# Patient Record
Sex: FEMALE | Race: WHITE | NOT HISPANIC OR LATINO | Employment: OTHER | URBAN - METROPOLITAN AREA
[De-identification: names, ages, dates, MRNs, and addresses within clinical notes are randomized per-mention and may not be internally consistent; named-entity substitution may affect disease eponyms.]

---

## 2017-05-10 ENCOUNTER — HISTORICAL (OUTPATIENT)
Dept: ADMINISTRATIVE | Facility: HOSPITAL | Age: 72
End: 2017-05-10

## 2017-05-10 LAB
CHOLEST SERPL-MCNC: 207 MG/DL
CHOLEST/HDLC SERPL: 3.4 {RATIO} (ref 0–4.4)
HDLC SERPL-MCNC: 61 MG/DL
LDLC SERPL CALC-MCNC: 125 MG/DL (ref 0–130)
TRIGL SERPL-MCNC: 104 MG/DL
VLDLC SERPL CALC-MCNC: 21 MG/DL

## 2017-07-27 ENCOUNTER — HISTORICAL (OUTPATIENT)
Dept: INTERNAL MEDICINE | Facility: CLINIC | Age: 72
End: 2017-07-27

## 2017-11-13 ENCOUNTER — HISTORICAL (OUTPATIENT)
Dept: INTERNAL MEDICINE | Facility: CLINIC | Age: 72
End: 2017-11-13

## 2017-11-13 LAB
ABS NEUT (OLG): 3.73 X10(3)/MCL (ref 2.1–9.2)
BASOPHILS # BLD AUTO: 0.08 X10(3)/MCL
BASOPHILS NFR BLD AUTO: 1 % (ref 0–1)
EOSINOPHIL # BLD AUTO: 1.24 X10(3)/MCL
EOSINOPHIL NFR BLD AUTO: 17 % (ref 0–5)
ERYTHROCYTE [DISTWIDTH] IN BLOOD BY AUTOMATED COUNT: 12.2 % (ref 11.5–14.5)
HCT VFR BLD AUTO: 37.4 % (ref 35–46)
HGB BLD-MCNC: 12.7 GM/DL (ref 12–16)
IMM GRANULOCYTES # BLD AUTO: 0.01 10*3/UL
IMM GRANULOCYTES NFR BLD AUTO: 0 %
LYMPHOCYTES # BLD AUTO: 1.97 X10(3)/MCL
LYMPHOCYTES NFR BLD AUTO: 26 % (ref 15–40)
MCH RBC QN AUTO: 32 PG (ref 26–34)
MCHC RBC AUTO-ENTMCNC: 34 GM/DL (ref 31–37)
MCV RBC AUTO: 94.2 FL (ref 80–100)
MONOCYTES # BLD AUTO: 0.42 X10(3)/MCL
MONOCYTES NFR BLD AUTO: 6 % (ref 4–12)
NEUTROPHILS # BLD AUTO: 3.73 X10(3)/MCL
NEUTROPHILS NFR BLD AUTO: 50 X10(3)/MCL
PLATELET # BLD AUTO: 248 X10(3)/MCL (ref 130–400)
PMV BLD AUTO: 11.1 FL (ref 7.4–10.4)
RBC # BLD AUTO: 3.97 X10(6)/MCL (ref 4–5.2)
T4 FREE SERPL-MCNC: 1.35 NG/DL (ref 0.76–1.46)
TSH SERPL-ACNC: 0.31 MIU/L (ref 0.36–3.74)
WBC # SPEC AUTO: 7.4 X10(3)/MCL (ref 4.5–11)

## 2018-02-07 ENCOUNTER — HISTORICAL (OUTPATIENT)
Dept: ADMINISTRATIVE | Facility: HOSPITAL | Age: 73
End: 2018-02-07

## 2018-07-25 ENCOUNTER — HISTORICAL (OUTPATIENT)
Dept: ADMINISTRATIVE | Facility: HOSPITAL | Age: 73
End: 2018-07-25

## 2018-08-06 ENCOUNTER — HISTORICAL (OUTPATIENT)
Dept: INTERNAL MEDICINE | Facility: CLINIC | Age: 73
End: 2018-08-06

## 2018-08-06 LAB
ABS NEUT (OLG): 4.26 X10(3)/MCL (ref 2.1–9.2)
ALBUMIN SERPL-MCNC: 3.7 GM/DL (ref 3.4–5)
ALBUMIN/GLOB SERPL: 1 RATIO (ref 1–2)
ALP SERPL-CCNC: 80 UNIT/L (ref 45–117)
ALT SERPL-CCNC: 27 UNIT/L (ref 12–78)
AST SERPL-CCNC: 13 UNIT/L (ref 15–37)
BASOPHILS # BLD AUTO: 0.08 X10(3)/MCL
BASOPHILS NFR BLD AUTO: 1 %
BILIRUB SERPL-MCNC: 0.4 MG/DL (ref 0.2–1)
BILIRUBIN DIRECT+TOT PNL SERPL-MCNC: 0.1 MG/DL
BILIRUBIN DIRECT+TOT PNL SERPL-MCNC: 0.3 MG/DL
BUN SERPL-MCNC: 15 MG/DL (ref 7–18)
CALCIUM SERPL-MCNC: 10 MG/DL (ref 8.5–10.1)
CHLORIDE SERPL-SCNC: 109 MMOL/L (ref 98–107)
CHOLEST SERPL-MCNC: 224 MG/DL
CHOLEST/HDLC SERPL: 2.7 {RATIO} (ref 0–4.4)
CO2 SERPL-SCNC: 26 MMOL/L (ref 21–32)
CREAT SERPL-MCNC: 0.9 MG/DL (ref 0.6–1.3)
DEPRECATED CALCIDIOL+CALCIFEROL SERPL-MC: 21.73 NG/ML (ref 30–80)
EOSINOPHIL # BLD AUTO: 0.79 10*3/UL
EOSINOPHIL NFR BLD AUTO: 10 %
ERYTHROCYTE [DISTWIDTH] IN BLOOD BY AUTOMATED COUNT: 13.7 % (ref 11.5–14.5)
GLOBULIN SER-MCNC: 3.8 GM/ML (ref 2.3–3.5)
GLUCOSE SERPL-MCNC: 84 MG/DL (ref 74–106)
HCT VFR BLD AUTO: 40.1 % (ref 35–46)
HDLC SERPL-MCNC: 84 MG/DL
HGB BLD-MCNC: 13.4 GM/DL (ref 12–16)
IMM GRANULOCYTES # BLD AUTO: 0.02 10*3/UL
IMM GRANULOCYTES NFR BLD AUTO: 0 %
LDLC SERPL CALC-MCNC: 121 MG/DL (ref 0–130)
LYMPHOCYTES # BLD AUTO: 1.97 X10(3)/MCL
LYMPHOCYTES NFR BLD AUTO: 25 % (ref 13–40)
MCH RBC QN AUTO: 31.8 PG (ref 26–34)
MCHC RBC AUTO-ENTMCNC: 33.4 GM/DL (ref 31–37)
MCV RBC AUTO: 95 FL (ref 80–100)
MONOCYTES # BLD AUTO: 0.64 X10(3)/MCL
MONOCYTES NFR BLD AUTO: 8 % (ref 4–12)
NEUTROPHILS # BLD AUTO: 4.26 X10(3)/MCL
NEUTROPHILS NFR BLD AUTO: 55 X10(3)/MCL
PLATELET # BLD AUTO: 316 X10(3)/MCL (ref 130–400)
PMV BLD AUTO: 10.5 FL (ref 7.4–10.4)
POTASSIUM SERPL-SCNC: 4 MMOL/L (ref 3.5–5.1)
PROT SERPL-MCNC: 7.5 GM/DL (ref 6.4–8.2)
RBC # BLD AUTO: 4.22 X10(6)/MCL (ref 4–5.2)
SODIUM SERPL-SCNC: 142 MMOL/L (ref 136–145)
T4 FREE SERPL-MCNC: 1.11 NG/DL (ref 0.76–1.46)
TRIGL SERPL-MCNC: 95 MG/DL
TSH SERPL-ACNC: 1.27 MIU/L (ref 0.36–3.74)
VLDLC SERPL CALC-MCNC: 19 MG/DL
WBC # SPEC AUTO: 7.8 X10(3)/MCL (ref 4.5–11)

## 2018-08-08 ENCOUNTER — HISTORICAL (OUTPATIENT)
Dept: INTERNAL MEDICINE | Facility: CLINIC | Age: 73
End: 2018-08-08

## 2018-08-27 ENCOUNTER — HISTORICAL (OUTPATIENT)
Dept: RADIOLOGY | Facility: HOSPITAL | Age: 73
End: 2018-08-27

## 2018-11-05 ENCOUNTER — HISTORICAL (OUTPATIENT)
Dept: INTERNAL MEDICINE | Facility: CLINIC | Age: 73
End: 2018-11-05

## 2018-11-05 LAB
ALBUMIN SERPL-MCNC: 3.5 GM/DL (ref 3.4–5)
ALBUMIN/GLOB SERPL: 1 RATIO (ref 1–2)
ALP SERPL-CCNC: 75 UNIT/L (ref 45–117)
ALT SERPL-CCNC: 21 UNIT/L (ref 12–78)
AST SERPL-CCNC: 13 UNIT/L (ref 15–37)
BILIRUB SERPL-MCNC: 0.4 MG/DL (ref 0.2–1)
BILIRUBIN DIRECT+TOT PNL SERPL-MCNC: 0.2 MG/DL
BILIRUBIN DIRECT+TOT PNL SERPL-MCNC: 0.2 MG/DL
BUN SERPL-MCNC: 13 MG/DL (ref 7–18)
CALCIUM SERPL-MCNC: 10.1 MG/DL (ref 8.5–10.1)
CHLORIDE SERPL-SCNC: 109 MMOL/L (ref 98–107)
CO2 SERPL-SCNC: 28 MMOL/L (ref 21–32)
CREAT SERPL-MCNC: 0.9 MG/DL (ref 0.6–1.3)
DEPRECATED CALCIDIOL+CALCIFEROL SERPL-MC: 25.33 NG/ML (ref 30–80)
GLOBULIN SER-MCNC: 3.5 GM/ML (ref 2.3–3.5)
GLUCOSE SERPL-MCNC: 86 MG/DL (ref 74–106)
POTASSIUM SERPL-SCNC: 5.1 MMOL/L (ref 3.5–5.1)
PROT SERPL-MCNC: 7 GM/DL (ref 6.4–8.2)
SODIUM SERPL-SCNC: 142 MMOL/L (ref 136–145)

## 2019-01-08 ENCOUNTER — HISTORICAL (OUTPATIENT)
Dept: ADMINISTRATIVE | Facility: HOSPITAL | Age: 74
End: 2019-01-08

## 2019-01-08 LAB
T4 FREE SERPL-MCNC: 1.35 NG/DL (ref 0.76–1.46)
TSH SERPL-ACNC: 0.33 MIU/L (ref 0.36–3.74)

## 2019-04-09 ENCOUNTER — HISTORICAL (OUTPATIENT)
Dept: ADMINISTRATIVE | Facility: HOSPITAL | Age: 74
End: 2019-04-09

## 2019-06-24 ENCOUNTER — HISTORICAL (OUTPATIENT)
Dept: ADMINISTRATIVE | Facility: HOSPITAL | Age: 74
End: 2019-06-24

## 2019-07-18 ENCOUNTER — HISTORICAL (OUTPATIENT)
Dept: ADMINISTRATIVE | Facility: HOSPITAL | Age: 74
End: 2019-07-18

## 2019-07-18 LAB
CRP SERPL-MCNC: <0.3 MG/DL
ERYTHROCYTE [SEDIMENTATION RATE] IN BLOOD: 24 MM/HR (ref 0–20)

## 2019-07-23 ENCOUNTER — HISTORICAL (OUTPATIENT)
Dept: INTERNAL MEDICINE | Facility: CLINIC | Age: 74
End: 2019-07-23

## 2019-07-23 LAB
ABS NEUT (OLG): 3.35 X10(3)/MCL (ref 2.1–9.2)
BASOPHILS # BLD AUTO: 0.06 X10(3)/MCL
BASOPHILS NFR BLD AUTO: 1 %
CHOLEST SERPL-MCNC: 205 MG/DL
CHOLEST/HDLC SERPL: 2.4 {RATIO} (ref 0–4.4)
DEPRECATED CALCIDIOL+CALCIFEROL SERPL-MC: 41.26 NG/ML (ref 30–80)
EOSINOPHIL # BLD AUTO: 1.52 10*3/UL
EOSINOPHIL NFR BLD AUTO: 20 %
ERYTHROCYTE [DISTWIDTH] IN BLOOD BY AUTOMATED COUNT: 13.2 % (ref 11.5–14.5)
HCT VFR BLD AUTO: 42.7 % (ref 35–46)
HDLC SERPL-MCNC: 87 MG/DL
HGB BLD-MCNC: 13.6 GM/DL (ref 12–16)
IMM GRANULOCYTES # BLD AUTO: 0.01 10*3/UL
IMM GRANULOCYTES NFR BLD AUTO: 0 %
LDLC SERPL CALC-MCNC: 102 MG/DL (ref 0–130)
LYMPHOCYTES # BLD AUTO: 2.11 X10(3)/MCL
LYMPHOCYTES NFR BLD AUTO: 28 % (ref 13–40)
MCH RBC QN AUTO: 29.8 PG (ref 26–34)
MCHC RBC AUTO-ENTMCNC: 31.9 GM/DL (ref 31–37)
MCV RBC AUTO: 93.6 FL (ref 80–100)
MONOCYTES # BLD AUTO: 0.51 X10(3)/MCL
MONOCYTES NFR BLD AUTO: 7 % (ref 0–24)
NEUTROPHILS # BLD AUTO: 3.35 X10(3)/MCL
NEUTROPHILS NFR BLD AUTO: 44 X10(3)/MCL
PLATELET # BLD AUTO: 261 X10(3)/MCL (ref 130–400)
PMV BLD AUTO: 10.9 FL (ref 7.4–10.4)
RBC # BLD AUTO: 4.56 X10(6)/MCL (ref 4–5.2)
T4 SERPL-MCNC: 11.5 MCG/DL (ref 4.8–13.9)
TRIGL SERPL-MCNC: 78 MG/DL
TSH SERPL-ACNC: 2.16 MIU/L (ref 0.36–3.74)
VLDLC SERPL CALC-MCNC: 16 MG/DL
WBC # SPEC AUTO: 7.6 X10(3)/MCL (ref 4.5–11)

## 2019-08-28 ENCOUNTER — HISTORICAL (OUTPATIENT)
Dept: RADIOLOGY | Facility: HOSPITAL | Age: 74
End: 2019-08-28

## 2019-11-25 ENCOUNTER — HISTORICAL (OUTPATIENT)
Dept: ADMINISTRATIVE | Facility: HOSPITAL | Age: 74
End: 2019-11-25

## 2020-02-18 ENCOUNTER — HISTORICAL (OUTPATIENT)
Dept: INTERNAL MEDICINE | Facility: CLINIC | Age: 75
End: 2020-02-18

## 2020-02-18 LAB
ALBUMIN SERPL-MCNC: 3.4 GM/DL (ref 3.4–5)
ALBUMIN/GLOB SERPL: 0.9 RATIO (ref 1.1–2)
ALP SERPL-CCNC: 114 UNIT/L (ref 45–117)
ALT SERPL-CCNC: 22 UNIT/L (ref 12–78)
AST SERPL-CCNC: 13 UNIT/L (ref 15–37)
BILIRUB SERPL-MCNC: 0.4 MG/DL (ref 0.2–1)
BILIRUBIN DIRECT+TOT PNL SERPL-MCNC: 0.1 MG/DL (ref 0–0.2)
BILIRUBIN DIRECT+TOT PNL SERPL-MCNC: 0.3 MG/DL
BUN SERPL-MCNC: 12 MG/DL (ref 7–18)
CALCIUM SERPL-MCNC: 9.9 MG/DL (ref 8.5–10.1)
CHLORIDE SERPL-SCNC: 111 MMOL/L (ref 98–107)
CHOLEST SERPL-MCNC: 210 MG/DL
CHOLEST/HDLC SERPL: 2.6 {RATIO} (ref 0–4.4)
CO2 SERPL-SCNC: 28 MMOL/L (ref 21–32)
CREAT SERPL-MCNC: 0.9 MG/DL (ref 0.6–1.3)
DEPRECATED CALCIDIOL+CALCIFEROL SERPL-MC: 31.5 NG/ML (ref 30–80)
EST. AVERAGE GLUCOSE BLD GHB EST-MCNC: 120 MG/DL
GLOBULIN SER-MCNC: 3.8 GM/ML (ref 2.3–3.5)
GLUCOSE SERPL-MCNC: 77 MG/DL (ref 74–106)
HBA1C MFR BLD: 5.8 % (ref 4.2–6.3)
HDLC SERPL-MCNC: 81 MG/DL (ref 40–59)
LDLC SERPL CALC-MCNC: 116 MG/DL
POTASSIUM SERPL-SCNC: 4.3 MMOL/L (ref 3.5–5.1)
PROT SERPL-MCNC: 7.2 GM/DL (ref 6.4–8.2)
SODIUM SERPL-SCNC: 141 MMOL/L (ref 136–145)
T4 SERPL-MCNC: 10.7 MCG/DL (ref 4.8–13.9)
TRIGL SERPL-MCNC: 66 MG/DL
TSH SERPL-ACNC: 1.5 MIU/L (ref 0.36–3.74)
VLDLC SERPL CALC-MCNC: 13 MG/DL

## 2020-07-08 ENCOUNTER — HISTORICAL (OUTPATIENT)
Dept: ADMINISTRATIVE | Facility: HOSPITAL | Age: 75
End: 2020-07-08

## 2020-07-17 ENCOUNTER — HISTORICAL (OUTPATIENT)
Dept: ADMINISTRATIVE | Facility: HOSPITAL | Age: 75
End: 2020-07-17

## 2020-07-21 ENCOUNTER — HISTORICAL (OUTPATIENT)
Dept: SURGERY | Facility: HOSPITAL | Age: 75
End: 2020-07-21

## 2020-08-03 ENCOUNTER — HISTORICAL (OUTPATIENT)
Dept: ADMINISTRATIVE | Facility: HOSPITAL | Age: 75
End: 2020-08-03

## 2020-08-31 ENCOUNTER — HISTORICAL (OUTPATIENT)
Dept: ADMINISTRATIVE | Facility: HOSPITAL | Age: 75
End: 2020-08-31

## 2020-09-10 ENCOUNTER — HISTORICAL (OUTPATIENT)
Dept: ADMINISTRATIVE | Facility: HOSPITAL | Age: 75
End: 2020-09-10

## 2020-09-10 LAB
ABS NEUT (OLG): 5.39 X10(3)/MCL (ref 2.1–9.2)
ALBUMIN SERPL-MCNC: 3.8 GM/DL (ref 3.4–5)
ALBUMIN/GLOB SERPL: 1 RATIO (ref 1.1–2)
ALP SERPL-CCNC: 111 UNIT/L (ref 45–117)
ALT SERPL-CCNC: 14 UNIT/L (ref 12–78)
AST SERPL-CCNC: 12 UNIT/L (ref 15–37)
BASOPHILS # BLD AUTO: 0.1 X10(3)/MCL (ref 0–0.2)
BASOPHILS NFR BLD AUTO: 1 %
BILIRUB SERPL-MCNC: 0.3 MG/DL (ref 0.2–1)
BILIRUBIN DIRECT+TOT PNL SERPL-MCNC: <0.1 MG/DL (ref 0–0.2)
BILIRUBIN DIRECT+TOT PNL SERPL-MCNC: ABNORMAL MG/DL
BUN SERPL-MCNC: 22 MG/DL (ref 7–18)
CALCIUM SERPL-MCNC: 10.6 MG/DL (ref 8.5–10.1)
CHLORIDE SERPL-SCNC: 107 MMOL/L (ref 98–107)
CO2 SERPL-SCNC: 26 MMOL/L (ref 21–32)
CREAT SERPL-MCNC: 1 MG/DL (ref 0.6–1.3)
DEPRECATED CALCIDIOL+CALCIFEROL SERPL-MC: 56.5 NG/ML (ref 30–80)
EOSINOPHIL # BLD AUTO: 1.1 X10(3)/MCL (ref 0–0.9)
EOSINOPHIL NFR BLD AUTO: 12 %
ERYTHROCYTE [DISTWIDTH] IN BLOOD BY AUTOMATED COUNT: 12.7 % (ref 11.5–14.5)
EST. AVERAGE GLUCOSE BLD GHB EST-MCNC: 103 MG/DL
GLOBULIN SER-MCNC: 3.8 GM/ML (ref 2.3–3.5)
GLUCOSE SERPL-MCNC: 97 MG/DL (ref 74–106)
HBA1C MFR BLD: 5.2 % (ref 4.2–6.3)
HCT VFR BLD AUTO: 38.9 % (ref 35–46)
HGB BLD-MCNC: 12.3 GM/DL (ref 12–16)
IMM GRANULOCYTES # BLD AUTO: 0.02 10*3/UL
IMM GRANULOCYTES NFR BLD AUTO: 0 %
LYMPHOCYTES # BLD AUTO: 1.9 X10(3)/MCL (ref 0.6–4.6)
LYMPHOCYTES NFR BLD AUTO: 21 %
MCH RBC QN AUTO: 30 PG (ref 26–34)
MCHC RBC AUTO-ENTMCNC: 31.6 GM/DL (ref 31–37)
MCV RBC AUTO: 94.9 FL (ref 80–100)
MONOCYTES # BLD AUTO: 0.7 X10(3)/MCL (ref 0.1–1.3)
MONOCYTES NFR BLD AUTO: 7 %
NEUTROPHILS # BLD AUTO: 5.39 X10(3)/MCL (ref 2.1–9.2)
NEUTROPHILS NFR BLD AUTO: 59 %
PLATELET # BLD AUTO: 300 X10(3)/MCL (ref 130–400)
PMV BLD AUTO: 10.1 FL (ref 7.4–10.4)
POTASSIUM SERPL-SCNC: 4.3 MMOL/L (ref 3.5–5.1)
PROT SERPL-MCNC: 7.6 GM/DL (ref 6.4–8.2)
RBC # BLD AUTO: 4.1 X10(6)/MCL (ref 4–5.2)
SODIUM SERPL-SCNC: 141 MMOL/L (ref 136–145)
T3FREE SERPL-MCNC: 1.9 PG/ML (ref 2.18–3.98)
T4 FREE SERPL-MCNC: 1.2 NG/DL (ref 0.76–1.46)
TSH SERPL-ACNC: 1.17 MIU/L (ref 0.36–3.74)
WBC # SPEC AUTO: 9.2 X10(3)/MCL (ref 4.5–11)

## 2020-10-05 ENCOUNTER — HISTORICAL (OUTPATIENT)
Dept: ADMINISTRATIVE | Facility: HOSPITAL | Age: 75
End: 2020-10-05

## 2020-10-08 ENCOUNTER — HISTORICAL (OUTPATIENT)
Dept: SURGERY | Facility: HOSPITAL | Age: 75
End: 2020-10-08

## 2020-11-04 ENCOUNTER — HISTORICAL (OUTPATIENT)
Dept: RADIOLOGY | Facility: HOSPITAL | Age: 75
End: 2020-11-04

## 2020-11-04 LAB
ABS NEUT (OLG): 3.27 X10(3)/MCL (ref 2.1–9.2)
ALBUMIN SERPL-MCNC: 3.9 GM/DL (ref 3.4–4.8)
ALBUMIN/GLOB SERPL: 1 RATIO (ref 1.1–2)
ALP SERPL-CCNC: 115 UNIT/L (ref 40–150)
ALT SERPL-CCNC: 9 UNIT/L (ref 0–55)
AST SERPL-CCNC: 14 UNIT/L (ref 5–34)
BASOPHILS # BLD AUTO: 0 X10(3)/MCL (ref 0–0.2)
BASOPHILS NFR BLD AUTO: 1 %
BILIRUB SERPL-MCNC: 0.5 MG/DL
BILIRUBIN DIRECT+TOT PNL SERPL-MCNC: 0.2 MG/DL (ref 0–0.5)
BILIRUBIN DIRECT+TOT PNL SERPL-MCNC: 0.3 MG/DL (ref 0–0.8)
BUN SERPL-MCNC: 16 MG/DL (ref 9.8–20.1)
CALCIUM SERPL-MCNC: 10.6 MG/DL (ref 8.4–10.2)
CHLORIDE SERPL-SCNC: 106 MMOL/L (ref 98–107)
CO2 SERPL-SCNC: 25 MMOL/L (ref 23–31)
CREAT SERPL-MCNC: 0.83 MG/DL (ref 0.55–1.02)
EOSINOPHIL # BLD AUTO: 0.9 X10(3)/MCL (ref 0–0.9)
EOSINOPHIL NFR BLD AUTO: 13 %
ERYTHROCYTE [DISTWIDTH] IN BLOOD BY AUTOMATED COUNT: 13 % (ref 11.5–14.5)
GLOBULIN SER-MCNC: 3.9 GM/DL (ref 2.4–3.5)
GLUCOSE SERPL-MCNC: 79 MG/DL (ref 82–115)
HCT VFR BLD AUTO: 41.8 % (ref 35–46)
HGB BLD-MCNC: 13.6 GM/DL (ref 12–16)
IMM GRANULOCYTES # BLD AUTO: 0.01 10*3/UL
IMM GRANULOCYTES NFR BLD AUTO: 0 %
LYMPHOCYTES # BLD AUTO: 2 X10(3)/MCL (ref 0.6–4.6)
LYMPHOCYTES NFR BLD AUTO: 29 %
MCH RBC QN AUTO: 30.2 PG (ref 26–34)
MCHC RBC AUTO-ENTMCNC: 32.5 GM/DL (ref 31–37)
MCV RBC AUTO: 92.9 FL (ref 80–100)
MONOCYTES # BLD AUTO: 0.6 X10(3)/MCL (ref 0.1–1.3)
MONOCYTES NFR BLD AUTO: 9 %
NEUTROPHILS # BLD AUTO: 3.27 X10(3)/MCL (ref 2.1–9.2)
NEUTROPHILS NFR BLD AUTO: 49 %
PLATELET # BLD AUTO: 274 X10(3)/MCL (ref 130–400)
PMV BLD AUTO: 11.3 FL (ref 7.4–10.4)
POTASSIUM SERPL-SCNC: 4.2 MMOL/L (ref 3.5–5.1)
PROT SERPL-MCNC: 7.8 GM/DL (ref 5.8–7.6)
PTH-INTACT SERPL-MCNC: 92.1 PG/ML (ref 18.4–80.1)
RBC # BLD AUTO: 4.5 X10(6)/MCL (ref 4–5.2)
SODIUM SERPL-SCNC: 138 MMOL/L (ref 136–145)
WBC # SPEC AUTO: 6.7 X10(3)/MCL (ref 4.5–11)

## 2020-11-23 ENCOUNTER — HISTORICAL (OUTPATIENT)
Dept: RADIOLOGY | Facility: HOSPITAL | Age: 75
End: 2020-11-23

## 2021-01-27 ENCOUNTER — HISTORICAL (OUTPATIENT)
Dept: ADMINISTRATIVE | Facility: HOSPITAL | Age: 76
End: 2021-01-27

## 2021-02-09 ENCOUNTER — HISTORICAL (OUTPATIENT)
Dept: RADIOLOGY | Facility: HOSPITAL | Age: 76
End: 2021-02-09

## 2021-04-20 ENCOUNTER — HISTORICAL (OUTPATIENT)
Dept: ADMINISTRATIVE | Facility: HOSPITAL | Age: 76
End: 2021-04-20

## 2021-04-20 LAB
ALBUMIN SERPL-MCNC: 3.8 GM/DL (ref 3.4–4.8)
ALBUMIN/GLOB SERPL: 1.1 RATIO (ref 1.1–2)
ALP SERPL-CCNC: 110 UNIT/L (ref 40–150)
ALT SERPL-CCNC: 13 UNIT/L (ref 0–55)
AST SERPL-CCNC: 15 UNIT/L (ref 5–34)
BILIRUB SERPL-MCNC: 0.5 MG/DL
BILIRUBIN DIRECT+TOT PNL SERPL-MCNC: 0.2 MG/DL (ref 0–0.5)
BILIRUBIN DIRECT+TOT PNL SERPL-MCNC: 0.3 MG/DL (ref 0–0.8)
BUN SERPL-MCNC: 11.8 MG/DL (ref 9.8–20.1)
CALCIUM SERPL-MCNC: 10.7 MG/DL (ref 8.4–10.2)
CALCIUM UR-MCNC: 17.3 MG/DL
CHLORIDE SERPL-SCNC: 111 MMOL/L (ref 98–107)
CO2 SERPL-SCNC: 23 MMOL/L (ref 23–31)
CREAT SERPL-MCNC: 0.8 MG/DL (ref 0.55–1.02)
CREAT UR-MCNC: 108.5 MG/DL (ref 45–106)
DEPRECATED CALCIDIOL+CALCIFEROL SERPL-MC: 32.9 NG/ML (ref 30–80)
GLOBULIN SER-MCNC: 3.6 GM/DL (ref 2.4–3.5)
GLUCOSE SERPL-MCNC: 85 MG/DL (ref 82–115)
PHOSPHATE SERPL-MCNC: 2.7 MG/DL (ref 2.3–4.7)
POTASSIUM SERPL-SCNC: 4.4 MMOL/L (ref 3.5–5.1)
PROT SERPL-MCNC: 7.4 GM/DL (ref 5.8–7.6)
PTH-INTACT SERPL-MCNC: 126.3 PG/ML (ref 8.7–77)
SODIUM SERPL-SCNC: 142 MMOL/L (ref 136–145)

## 2021-05-05 ENCOUNTER — HISTORICAL (OUTPATIENT)
Dept: ADMINISTRATIVE | Facility: HOSPITAL | Age: 76
End: 2021-05-05

## 2021-07-21 ENCOUNTER — HISTORICAL (OUTPATIENT)
Dept: ENDOCRINOLOGY | Facility: CLINIC | Age: 76
End: 2021-07-21

## 2021-07-21 LAB
ALBUMIN SERPL-MCNC: 3.8 GM/DL (ref 3.4–4.8)
BUN SERPL-MCNC: 15.4 MG/DL (ref 9.8–20.1)
CALCIUM SERPL-MCNC: 10.9 MG/DL (ref 8.4–10.2)
CHLORIDE SERPL-SCNC: 109 MMOL/L (ref 98–107)
CO2 SERPL-SCNC: 30 MMOL/L (ref 23–31)
CREAT SERPL-MCNC: 0.82 MG/DL (ref 0.55–1.02)
DEPRECATED CALCIDIOL+CALCIFEROL SERPL-MC: 32.1 NG/ML (ref 30–80)
GLUCOSE SERPL-MCNC: 82 MG/DL (ref 82–115)
PHOSPHATE SERPL-MCNC: 2.9 MG/DL (ref 2.3–4.7)
POTASSIUM SERPL-SCNC: 4.9 MMOL/L (ref 3.5–5.1)
SODIUM SERPL-SCNC: 142 MMOL/L (ref 136–145)
T4 FREE SERPL-MCNC: 1.25 NG/DL (ref 0.7–1.48)
TSH SERPL-ACNC: 0.2 UIU/ML (ref 0.35–4.94)

## 2021-09-28 ENCOUNTER — HISTORICAL (OUTPATIENT)
Dept: ADMINISTRATIVE | Facility: HOSPITAL | Age: 76
End: 2021-09-28

## 2021-09-28 LAB — TSH SERPL-ACNC: 1.31 UIU/ML (ref 0.35–4.94)

## 2021-11-08 ENCOUNTER — HISTORICAL (OUTPATIENT)
Dept: RADIOLOGY | Facility: HOSPITAL | Age: 76
End: 2021-11-08

## 2022-01-26 ENCOUNTER — HISTORICAL (OUTPATIENT)
Dept: ADMINISTRATIVE | Facility: HOSPITAL | Age: 77
End: 2022-01-26

## 2022-01-26 LAB
ALBUMIN SERPL-MCNC: 3.6 GM/DL (ref 3.4–4.8)
BUN SERPL-MCNC: 17.4 MG/DL (ref 9.8–20.1)
CALCIUM SERPL-MCNC: 10.7 MG/DL (ref 8.7–10.5)
CHLORIDE SERPL-SCNC: 109 MMOL/L (ref 98–107)
CO2 SERPL-SCNC: 25 MMOL/L (ref 23–31)
CREAT SERPL-MCNC: 0.84 MG/DL (ref 0.55–1.02)
GLUCOSE SERPL-MCNC: 90 MG/DL (ref 82–115)
PHOSPHATE SERPL-MCNC: 2.9 MG/DL (ref 2.3–4.7)
POTASSIUM SERPL-SCNC: 4.3 MMOL/L (ref 3.5–5.1)
SODIUM SERPL-SCNC: 140 MMOL/L (ref 136–145)
TSH SERPL-ACNC: 1.16 UIU/ML (ref 0.35–4.94)

## 2022-03-02 ENCOUNTER — HISTORICAL (OUTPATIENT)
Dept: RADIOLOGY | Facility: HOSPITAL | Age: 77
End: 2022-03-02

## 2022-03-02 LAB — CREAT SERPL-MCNC: 0.84 MG/DL (ref 0.55–1.02)

## 2022-04-10 ENCOUNTER — HISTORICAL (OUTPATIENT)
Dept: ADMINISTRATIVE | Facility: HOSPITAL | Age: 77
End: 2022-04-10
Payer: MEDICARE

## 2022-04-29 VITALS
HEIGHT: 59 IN | WEIGHT: 138.88 LBS | DIASTOLIC BLOOD PRESSURE: 64 MMHG | OXYGEN SATURATION: 97 % | SYSTOLIC BLOOD PRESSURE: 116 MMHG | BODY MASS INDEX: 28 KG/M2

## 2022-04-30 NOTE — H&P
Patient:   Mikala Thompson            MRN: [id number]            FIN: [id number]               Age:   74 years     Sex:  Female     :  1945   Associated Diagnoses:   None   Author:   Kasia Fishman MD      HPI: PT here for cataract surgery of the left eye. Denies changes in health or medications since the patient was last seen in clinic.    ROS: Negative x 10      SLE: Visually significant cataract of the left eye           General NAP  HENT atraumatic  Eyes: cataract OS  Neck: nontender, no masses or thyromegaly  Respiratory: no distress on room air  Cardiovascular: regular rate  Gastrointestinal: no hepatomegaly   Lymphatics: no lymphadenopathy  Musculoskeletal: wnl    ?  Assessment/Plan  1.  Visually significant cataract of the left eye   - Covid testing verified: Negative  - Allergies: Verified  - Surgical site marked  -Consent: Verified  - Questions answered  - Ok to proceed with surgery     Kasia Fishman MD  [employer] [facility] PGY-4

## 2022-04-30 NOTE — H&P
Patient:   Mikala Thompson            MRN: [id number]            FIN: [id number]               Age:   74 years     Sex:  Female     :  1945   Associated Diagnoses:   None   Author:   Aliyah Parker MD      Chief Complaint:  referral for left wrist fx   History of Present Illness: 74-year-old female, RHD, who presented after a fall on 3030 and diagnosed with a closed left distal radius fracture.    Here today for ORIF left distal radius with Dr. Seals.    Review of Systems Constitutional: no fever, fatigue, weakness  Eye: no vision loss, eye redness, drainage, or pain  ENMT: no sore throat, ear pain, sinus pain/congestion, nasal congestion/drainage  Respiratory: no cough, no wheezing, no shortness of breath  Cardiovascular: no chest pain, no palpitations, no edema  Gastrointestinal: no nausea, vomiting, or diarrhea. No abdominal pain Physical Exam Vitals & Measurements WT: 65.5 kg BMI: 29.11 General appearance: No acute distress  Orientation: Awake, alert and oriented to person, place and time  Mood/Affect: Normal  Gait: Normal  Coordination: Normal    Left upper extremity:  Inspection: Significant swelling around the wrist and dorsal hand. No open wounds. Bruising  Palpation: Tender palpation on radial wrist  Range of motion: Deferred secondary to known fracture  Stability: Crepitus and motion at fracture site  Motor: Intact thumbs up, okay and finger cross  Skin: No open wounds or rashes  Vascular: Fingers are warm and well-perfused. 2+ radial pulse  Sensation: Intact to radial, ulnar and median nerves   Assessment/Plan   74-year-old female, RHD, who presented after a fall on 3030 and diagnosed with a closed left distal radius fracture with dorsal angulation and comminution.  Here today for ORIF left distal radius with Dr. Seals.

## 2022-04-30 NOTE — OP NOTE
Patient:   Mikala Thompson            MRN: [id number]            FIN: [id number]               Age:   74 years     Sex:  Female     :  1945   Associated Diagnoses:   None   Author:   Kasia Fishman MD      OPERATIVE NOTE -- OPHTHALMOLOGY SERVICE    DATE: 10/8/20    SURGEON: Kasia Fishman MD    ATTENDING: DEMARCO Ortiz MD    PRE-OPERATIVE DIAGNOSIS: Visually significant cataract, LEFT eye    POST-OPERATIVE DIAGNOSIS: Visually significant cataract, LEFT eye    PROCEDURES: Phacoemulsification with intraocular lens, LEFT eye    IMPLANT: MX60E +12.50 SN [id number]    ANESTHESIA: MAC with topical lidocaine    COMPLICATIONS: None    ESTIMATED BLOOD LOSS: < 5 cc    INDICATION:    The patient has a history of painless progressive visual loss and difficulty with activities of daily living secondary to cataract formation. After a thorough discussion of the risks, benefits and alternatives to cataract surgery, including, but not limited to, the rare risks of infection, retinal detachment, need for additional surgery, loss of vision and even loss of the eye, the patient voices good understanding and desires to proceed. Written informed consent was confirmed and witnessed prior to surgery.    PROCEDURE IN DETAIL:    The patients IOL calculations and lens selection were reviewed and confirmed. After verification and marking of the proper eye in the preop holding area, several sets of 1% tropicamide, 2.5% phenylephrine, and 1% cyclopentolate drops were then placed.    The patient was brought to the operating room in supine position where the eye was prepped and draped in standard sterile fashion using 5% betadine. A lid speculum placed. Topical tetracaine was used in addition to the preoperative anesthesia. A paracentesis incision was created through which preservative free lidocaine was injected into the eye. Then trypan blue was injected through the paracentesis for better visualization of the anterior  capsule. Preservative free lidocaine was again injected through the paracentesis to remove the trypan blue. At this time, viscoelastic was used to fill the anterior chamber through the paracentesis wound. A 2.4mm keratome blade was used to create a triplanar temporal clear corneal incision. A cystotome and Utrata forceps was then used to fashion a continuous curvilinear capsulorrhexis. Hydrodissection with BSS was performed using a hydrodissection cannula. Phacoemulsification of the nucleus was carried out with a divide and conquer technique, and all remaining epinuclear and cortical material was removed. The eye was reformed using viscoelastic and the above listed intraocular lens was implanted into the capsular bag. All remaining viscoelastic was removed from the eye with the  I&A. All wounds were found to be watertight at the end of the case.    Drops of Vigamox and Pred Forte.t were instilled and an eye shield placed over the eye. The patient tolerated the procedure well and was taken to the recovery area in stable condition. The patient was instructed to follow-up for routine post-operative care the following morning.    The attending surgeon was present, supervising, and assisting as necessary during the entire surgery.

## 2022-05-02 NOTE — HISTORICAL [facility] CERNER
This is a historical note converted from Shiela. Formatting and pictures may have been removed.  Please reference Shiela for original formatting and attached multimedia. Chief Complaint  f/u, no c/o voiced, needs refills on all meds, refused vaccines  History of Present Illness  Ms Thompson who is a 73-year-old white female with a history of hypertension, hypothyroidism, hyperlipidemia, bilateral wrist osteoarthritis, osteopenia.?Patient had a fall on 6/30/2020 and was diagnosed with a closed left distal radius fracture, underwent ORIF left distal radius. ?Currently wearing her brace.??Needs to have some pain?at the site of the fracture, her arms in a brace. ?States that?she has some weakness?with her fingers, however, she is been working with physical therapy.? She denies any other complaints today, compliant with all her medications. Patient follows up with ophthalmology for her blepharitis/keratoconjunctivitis and glaucoma  Review of Systems  General:?no fever, no night sweats, chills, fatigue, changes in weight.  Skin: no rashes, bruises, petechia  HEENT: no headache, head injury, no acute vision changes.  CVS: no chest pain, palpitations, orthopnea, PND, edema  Resp: no SOB, cough, wheezing  GI: no dysphagia, melena, hematochezia, abdominal pain, nausea, vomiting, constipation.  : no dysuria, hematuria, polyuria, CVA pain, nocturia  Musculoskeletal: left arm  Neuro: no headaches, syncope, seizures, numbness, tingling, vertigo, dizziness  Physical Exam  Vitals & Measurements  T:?36.6? ?C (Oral)? HR:?73(Peripheral)? RR:?18? BP:?108/67?  HT:?150.00?cm? WT:?65.000?kg? BMI:?28.89?  General: AAOx3, NAD, alert and cooperative  HEENT: Normocephalic, atraumatic,?PERRLA, EOMI,?no scleral icterus, no?conjunctival pallor  Neck: no LAD, no JVD, supple, no masses or thyromegaly. Trachea midline  CVS: S1/S2 nml, RRR, no murmurs, rubs or gallops  Resp: CTA B/L, no rhonchi, rales, or wheezing  GI: Not distended, not tender, BS+,  no guarding  Skin: not jaundiced, warm, no rashes  Extremities: slight restriction of ROM in left?forearm,?covered in brace  Neuro: CN II-XII grossly intact, strength and sensation symmetric and intact throughout, no focal neurological deficits.  Assessment/Plan  1)?Hypertension  -well controlled  -Continue amlodipine 5, losartan 50  ?  2) Hyperlipidemia  -Cholesterol 210,  on 2/2020.?  -Advised patient to avoid fatty, greasy and fried foods and to incorporate more fruits and vegetables in her diet  -Continue pravastatin 40  ?  3) Hypothyroidism  - TSH and T4 within normal limits?on 2/2020  - Continue Synthroid 75??g  - Will repeat thyroid panel?today  ?  4) Bilateral wrist osteoarthritis/knee osteoarthritis  - Advised against the use of NSAIDs given her slightly depressed renal function  - Follows with sports medicine clinic for injections  - Patient currently not on any NSAIDs  ?  5) Osteopenia  -DEXA scan on 8/2018 showed osteopenia in the femoral necks  -We will repeat a DEXA scan to see if there is worsening of her T-scores which would necessitate antiresorptive agents  -Continue Ca/Vit D supplementation  -Vit D WNL on 2/2020  ?  6)?Left distal radius fracture s/p ORIF  -Had a left distal radius fx on 6/30/2020 and underwent ORIF on 7/21/2020  -Continue f/u with Ortho  ?  7) Glaucoma  8) Blepharitis/keratoconjunctivitis  -Continue f/u with Ophthalmology  ?  ?  ?  9) Stage 2 CKD  -eGFR on 2/2020 was 79  -?Continue renal sparing activities:  -Follow low sodium diet (2 grams a day)  -Control high blood pressure ( goal BP < 130/80, please record BP at home every day and bring log to next office visit)  -Exercise at least 30 minutes a day, 5 days a week.  -Maintain healthy weight.  -Decrease or stop alcohol use  -Do not smoke  -Stay well hydrated  -Receive Pneumovax, Flu, and HBV vaccines if indicated.  -Do not take NSAIDs (Ibuprofen, Naproxen, Aleve, Advil, Toradol, Mobic), may take only Tylenol as needed  for pain/headaches.  -Take cholesterol-lowering medications as prescribed (LDL goal <100)  ?  ?  ?  Womens Health Maintenance?  ?  Cervical cancer screening:?Not indicated  Breast cancer screening:?Last on 8/2019 WNL, scheduled  Colorectal cancer screening:?FIT -ve on 4/2019, repeat ordered  DEXA Scan:?Reorder?  ?  Vaccines: UTD  Zoster: Refused previously  ?   Problem List/Past Medical History  Ongoing  Cataract, nuclear sclerotic, both eyes  Hypercholesteremia  Hypertension  Hypothyroid  Keratoconjunctivitis sicca of both eyes  Open angle with borderline findings, low risk  Osteoarthritis, hand  Osteoarthritis, knee  Historical  Knowledge deficit  Pregnant  Pregnant  Pregnant  Pregnant  Procedure/Surgical History  [place]/screw for opposing bone-to-bone or soft tissue-to-bone (implantable) (07/21/2020)  Injection(s), anesthetic agent(s) and/or steroid; brachial plexus (07/21/2020)  Introduction of Anesthetic Agent into Peripheral Nerves and Plexi, Percutaneous Approach (07/21/2020)  Open treatment of distal radial extra-articular fracture or epiphyseal separation, with internal fixation (07/21/2020)  ORIF Radius (Left) (07/21/2020)  Reposition Left Radius with Internal Fixation Device, Open Approach (07/21/2020)  Application of short arm splint (forearm to hand); static (06/30/2020)  Immobilization of Left Lower Arm using Splint (06/30/2020)  History of arthroscopic shoulder surgery (2008)  TL - Tubal ligation (1980)   Medications  acetaminophen-hydrocodone 325 mg-5 mg oral tablet, 1 tab(s), Oral, BID  amlodipine 5 mg oral tablet, See Instructions, 5 refills  benzonatate 200 mg oral capsule, 200 mg= 1 cap(s), Oral, TID,? ?Not taking  levothyroxine 75 mcg (0.075 mg) oral tablet, See Instructions, 6 refills  losartan 50 mg oral tablet, See Instructions  pravastatin 40 mg oral tablet, See Instructions, 6 refills  Vitamin C 1000 mg oral tablet, 1000 mg= 1 tab(s), Oral, Daily  Allergies  No Known Allergies  Social  History  Abuse/Neglect  No, No, Yes, 09/10/2020  Alcohol - No Risk, 05/14/2015  Never, 05/30/2016  Employment/School  Retired, Work/School description: retired. Highest education level: Some college. Operates hazardous equipment: No., 05/14/2015  Exercise  Exercise frequency: Daily. Self assessment: Good condition. Exercise type: Walking., 05/14/2015  Home/Environment  Lives with Alone. Living situation: Home/Independent. Alcohol abuse in household: No. Substance abuse in household: No. Smoker in household: No. Injuries/Abuse/Neglect in household: No. Feels unsafe at home: No. Family/Friends available for support: Yes. Concern for family members at home: No. Major illness in household: No. Financial concerns: No. TV/Computer concerns: No., 05/14/2015  Nutrition/Health  Regular, Good, 07/18/2019  Sexual  Sexually active: No., 09/10/2020  Gender Identity Identifies as female., 03/06/2020  Spiritual/Cultural  Adventist, Yes, 09/10/2020  Substance Use - No Risk, 05/14/2015  Never, 05/30/2016  Tobacco - No Risk, 05/14/2015  Never (less than 100 in lifetime), N/A, Household tobacco concerns: No., 09/10/2020  Family History  Cancer: Father.  Metastatic cancer: Brother.  Immunizations  Vaccine Date Status Comments   influenza virus vaccine, inactivated 11/05/2019 Given    pneumococcal 13-valent conjugate vaccine 07/18/2019 Given    influenza virus vaccine, inactivated 11/12/2018 Given    tetanus/diphtheria/pertussis, acel(Tdap) 07/25/2018 Given    influenza virus vaccine, inactivated 11/13/2017 Given    influenza virus vaccine, inactivated 12/14/2016 Given    pneumococcal 23-polyvalent vaccine 11/04/2015 Given tolerated well   influenza virus vaccine, inactivated 11/04/2015 Given tolerated well   Health Maintenance  Health Maintenance  ???Pending?(in the next year)  ??? ??OverDue  ??? ? ? ?Influenza Vaccine due??and every?  ??? ? ? ?Pneumococcal Vaccine due??and every?  ??? ? ? ?Alcohol Misuse Screening due??01/02/20??and  every 1??year(s)  ??? ? ? ?Colorectal Screening due??04/07/20??and every 1??year(s)  ??? ??Due?  ??? ? ? ?Aspirin Therapy for CVD Prevention due??07/18/20??and every 1??year(s)  ??? ? ? ?Bone Density Screening due??08/08/20??and every 2??year(s)  ??? ? ? ?Hypertension Management-Education due??09/10/20??and every 1??year(s)  ??? ? ? ?Medicare Annual Wellness Exam due??09/10/20??and every 1??year(s)  ??? ? ? ?Zoster Vaccine due??09/10/20??and every?  ??? ??Due In Future?  ??? ? ? ?Obesity Screening not due until??01/01/21??and every 1??year(s)  ??? ? ? ?Advance Directive not due until??01/02/21??and every 1??year(s)  ??? ? ? ?Cognitive Screening not due until??01/02/21??and every 1??year(s)  ??? ? ? ?Fall Risk Assessment not due until??01/02/21??and every 1??year(s)  ??? ? ? ?Functional Assessment not due until??01/02/21??and every 1??year(s)  ??? ? ? ?Diabetes Screening not due until??02/17/21??and every 1??year(s)  ??? ? ? ?Hypertension Management-BMP not due until??02/17/21??and every 1??year(s)  ??? ? ? ?Breast Cancer Screening not due until??08/27/21??and every 2??year(s)  ???Satisfied?(in the past 1 year)  ??? ??Satisfied?  ??? ? ? ?ADL Screening on??09/10/20.??Satisfied by Marli Macias LPN  ??? ? ? ?Advance Directive on??09/10/20.??Satisfied by Macias LPN, Marli Yamil  ??? ? ? ?Blood Pressure Screening on??09/10/20.??Satisfied by Macias LPN, Marli Yamil  ??? ? ? ?Body Mass Index Check on??09/10/20.??Satisfied by Macias LPN, Marli Yamil  ??? ? ? ?Cognitive Screening on??09/10/20.??Satisfied by Gilberto ALBERTSN, Marli Yamil  ??? ? ? ?Depression Screening on??09/10/20.??Satisfied by Gilberto ALBERTSN, Marli Yamil  ??? ? ? ?Diabetes Screening on??02/18/20.??Satisfied by Srinivasa Dsouza Jr.  ??? ? ? ?Fall Risk Assessment on??09/10/20.??Satisfied by Gilberto VALENZUELA, Marli Yamil  ??? ? ? ?Functional Assessment on??09/10/20.??Satisfied by Gilberto ALBERTSN, Marli Yamil  ??? ? ?  ?Hypertension Management-Blood Pressure on??09/10/20.??Satisfied by Marli Macias LPN  ??? ? ? ?Influenza Vaccine on??11/05/19.??Satisfied by Marli Macias LPN  ??? ? ? ?Lipid Screening on??02/18/20.??Satisfied by Srinivasa Dsouza Jr.  ??? ? ? ?Obesity Screening on??09/10/20.??Satisfied by Marli Macias LPN  ?      Will need Ms Thompson to come back in a month for her flu vaccine   Reviewed present illness, physical exam, assessment/plan of resident. Case discussed with the resident. Care provided is reasonable and necessary.  Fragility fracture as patient fell from standing position fracturing left wrist,?will discuss with patient weight bearing exercise, calcium in diet and supplement to achieve 100-1200 mg/day, Vitamin D level >30, antiresorptive agent

## 2022-05-02 NOTE — HISTORICAL [facility] CERNER
This is a historical note converted from [employer]. Formatting and pictures may have been removed.  Please reference [employer] for original formatting and attached multimedia. Chief Complaint  Follow up, medication management/refills, c/o dry, hacking cough time one week, question of Losartan  History of Present Illness  73-year-old white female with a history of hypertension, hypothyroidism, hyperlipidemia, bilateral wrist osteoarthritis, osteopenia who is here for a follow-up visit and medication refill.?The patient c/o of a dry hacking cough since 1 week. Not associated fever, chills, rash, hemoptysis, night sweats. Also c/o post nasal drip. No runny nose or watering from eyes. Patient was concerned regarding her BP medication, Losartan and asked if it was being recalled. I counselled and told her that losartan is safe and valsartan was being recalled. Patient had situational depression and was on TCAs. Currently not on any medication.  Review of Systems  Constitutional: No appetite changes, weight loss, fever, chills, change in activity?level.  HEENT: No earache, No changes vision, no changes in swallowing, no coryza, no scleral or conjunctival changes, no sore throat.  Respiratory: Cough +, dry, No?wheezing, stridor, shortness of breath, difficulty breathing.  Cardiovascular: No discoloration, chest pain, history of murmur, palpitations, shortness of breath, dizziness.  Gastrointestinal: No vomiting, stool changes, blood in stool, abdominal pain.  Genitourinary: No frequency, discharge, ?burning, blood in urine.  Neurological: No lethargy, seizure activity, headache, weakness, headache.  Developmental: No history of gross motor, fine motor, speech or other developmental delays.  Integumentary: No rashes, bruising, lesions.  Psych/Behavior: No history of irritability, lethargy, behavior changes, mood changes.  Endocrine: No unexpected weight changes, growth pattern changes, skin temperature changes, heat or cold  intolerance, appetite or thirst changes.  Musculoskeletal: No swelling of joints, diminished use of extremities, gait changes, weakness.  ?  ?  ?  ?  Physical Exam  Vitals & Measurements  T:?36.9? ?C (Oral)? HR:?79(Peripheral)? RR:?18? BP:?109/64?  HT:?149?cm? WT:?60.4?kg? BMI:?27.21?  No pallor, icterus, cyanosis, clubbing, lymphadenopathy, pedal edema  ?  General:?well-developed well-nourished in no acute distress  Eye: PERRLA, EOMI, clear conjunctiva, eyelids normal  Head:?Normocephalic, atraumatic  ENT:?TMs/ear canals clear, oropharynx without erythema/exudate, oropharynx and nasal mucosal surfaces moist, no maxillary/frontal sinus tenderness to palpation  Neck: full range of motion, no JVD or carotid bruits. No thyromegaly or lymphadenopathy  Respiratory:?clear to auscultation bilaterally, without rales, wheezes or rhonchi. Normal work of breathing. Chest rise symmetrical on inspiration.  Cardiovascular:?regular rate and rhythm without murmurs, gallops or rubs. Normal peripheral perfusion.  Gastrointestinal:?soft, non-tender, non-distended with normal bowel sounds, without masses to palpation  Genitourinary: no CVA tenderness to palpation  Musculoskeletal:?full range of motion of all extremities/spine without limitation or discomfort  Integumentary: no rashes or skin lesions present  Neurologic: cranial nerves intact, no signs of peripheral neurological deficit, motor/sensory function intact  Cognition and Speech:?Oriented, Speech clear and coherent, Functional cognition intact.  Psychiatric:?Cooperative, Appropriate mood & affect  ?  Assessment/Plan  Hypertension  -Well Controlled  -Continue amlodipine 5, losartan 50  ?   Hyperlipidemia  -Controlled  -Continue pravastatin 40  -Ordered Lipid panel for today  ?   Hypothyroidism  -TSH and T4 within normal limits  -Continue Synthroid 75 ?g  -Ordered thyroid panel for today.  ?   Bilateral wrist osteoarthritis/knee osteoarthritis  -Advised against the use of NSAIDs  given her slightly depressed renal function  -Follows with sports medicine clinic for injections  -Patient currently not on any NSAIDs  ?   Osteopenia/ Vit D deficiency  -Continue calcium supplmenet 1000-1200mg daily  -Continue Vit D OTC replacement 2000U daily  -Ordered VIt D level today.  ?  ?   -Ordered: CBC,TSH,T4, Vit D, Lipid panel  -Medications: Tessalon Perles, fexofenadine, Fluticasone nasal spray  -Situational depression resolved, patient off TCAs.  -Colon cancer screening: Colonoscopy 2013 normal per previous clinic note  -Mammogram: 8/2018 negative. Patient has another mammogram scheduled for 8/19.  -Pneumonia 23?vaccine?received on 11/15  -Pneumonia 13?administered on 7/2019  -Patient advised on Shingrix vaccine. Will research and consider taking it next time.  -Will follow up in 3 months  ?   Problem List/Past Medical History  Ongoing  Cataract, nuclear sclerotic, both eyes  Hypercholesteremia  Hypothyroid  Keratoconjunctivitis sicca of both eyes  Knowledge deficit  Open angle with borderline findings, low risk  Historical  Pregnant  Pregnant  Pregnant  Pregnant  Procedure/Surgical History  History of arthroscopic shoulder surgery (2008)  TL - Tubal ligation (1980)   Medications  amLODIPine 5 mg oral tablet, 5 mg= 1 tab(s), Oral, Daily  ciprofloxacin 0.3% ophthalmic solution, 2 drop(s), Eye-Both, q4hr,? ?Not Taking, Completed Rx  erythromycin 0.5% ophthalmic ointment, 1 kaitlynn, Eye-Both, QID,? ?Not Taking, Completed Rx  ketotifen 0.025% ophthalmic solution, 1 drop(s), Eye-Both, q8hr,? ?Not Taking, Completed Rx  levothyroxine 75 mcg (0.075 mg) oral tablet, 75 mcg= 1 tab(s), Oral, Daily  loratadine 10 mg oral tablet, 10 mg= 1 tab(s), Oral, Daily,? ?Not Taking, Completed Rx  losartan 50 mg oral tablet, 50 mg= 1 tab(s), Oral, Daily, 3 refills  meloxicam 7.5 mg oral tablet, 7.5 mg= 1 tab(s), Oral, Daily,? ?Not taking  olopatadine 0.1% ophthalmic solution, 1 drop(s), Eye-Both, BID,? ?Not Taking, Completed  Rx  Pravastatin 40 mg Oral Tab, 40 mg= 1 tab(s), Oral, Daily  Xyzal 5 mg oral tablet, 5 mg= 1 tab(s), Oral, qPM,? ?Not taking  Allergies  No Known Allergies  Social History  Abuse/Neglect  No, 07/18/2019  No, 06/24/2019  Alcohol - No Risk, 05/14/2015  Never, 05/30/2016  Employment/School  Retired, Work/School description: retired. Highest education level: Some college. Operates hazardous equipment: No., 05/14/2015  Exercise  Exercise frequency: Daily. Self assessment: Good condition. Exercise type: Walking., 05/14/2015  Home/Environment  Lives with Alone. Living situation: Home/Independent. Alcohol abuse in household: No. Substance abuse in household: No. Smoker in household: No. Injuries/Abuse/Neglect in household: No. Feels unsafe at home: No. Family/Friends available for support: Yes. Concern for family members at home: No. Major illness in household: No. Financial concerns: No. TV/Computer concerns: No., 05/14/2015  Nutrition/Health  Regular, Good, 07/18/2019  Sexual  Gender Identity Identifies as female., 07/01/2019  Substance Use - No Risk, 05/14/2015  Never, 05/30/2016  Tobacco - No Risk, 05/14/2015  Never (less than 100 in lifetime), N/A, 07/18/2019  Family History  Cancer: Father.  Metastatic cancer: Brother.  Immunizations  Vaccine Date Status Comments   influenza virus vaccine, inactivated 11/12/2018 Given    tetanus/diphtheria/pertussis, acel(Tdap) 07/25/2018 Given    influenza virus vaccine, inactivated 11/13/2017 Given    influenza virus vaccine, inactivated 12/14/2016 Given    pneumococcal 23-polyvalent vaccine 11/04/2015 Given tolerated well   influenza virus vaccine, inactivated 11/04/2015 Given tolerated well   Health Maintenance  Health Maintenance  ???Pending?(in the next year)  ??? ??OverDue  ??? ? ? ?Pneumococcal Vaccine due??and every?  ??? ? ? ?Advance Directive due??01/01/19??and every 1??year(s)  ??? ??Due?  ??? ? ? ?Aspirin Therapy for CVD Prevention due??07/18/19??and every  1??year(s)  ??? ? ? ?Zoster Vaccine due??07/18/19??and every 100??year(s)  ??? ??Due In Future?  ??? ? ? ?Hypertension Management-BMP not due until??11/05/19??and every 1??year(s)  ??? ? ? ?Alcohol Misuse Screening not due until??01/01/20??and every 1??year(s)  ??? ? ? ?Cognitive Screening not due until??01/01/20??and every 1??year(s)  ??? ? ? ?Fall Risk Assessment not due until??01/01/20??and every 1??year(s)  ??? ? ? ?Functional Assessment not due until??01/01/20??and every 1??year(s)  ??? ? ? ?Geriatric Depression Screening not due until??01/01/20??and every 1??year(s)  ??? ? ? ?Obesity Screening not due until??01/01/20??and every 1??year(s)  ??? ? ? ?Colorectal Screening ([facility]) not due until??04/07/20??and every 1??year(s)  ??? ? ? ?ADL Screening not due until??05/14/20??and every 1??year(s)  ??? ? ? ?Hypertension Management-Blood Pressure not due until??07/17/20??and every 1??year(s)  ???Satisfied?(in the past 1 year)  ??? ??Satisfied?  ??? ? ? ?ADL Screening on??05/14/19.??Satisfied by [place], Samreen F  ??? ? ? ?Advance Directive on??09/11/18.??Satisfied by Maddie Bustos LPN  ??? ? ? ?Alcohol Misuse Screening on??04/09/19.??Satisfied by Ludivinate, Samreen F  ??? ? ? ?Blood Pressure Screening on??07/18/19.??Satisfied by Trudi VALENZUELA Chantal  ??? ? ? ?Body Mass Index Check on??07/18/19.??Satisfied by Chantal Brush LPN  ??? ? ? ?Bone Density Screening on??08/08/18.??Satisfied by Elizabeth Jade  ??? ? ? ?Breast Cancer Screening ([facility]) on??08/27/18.??Satisfied by Cailin Dumont  ??? ? ? ?Cognitive Screening on??04/09/19.??Satisfied by Samreen Murray  ??? ? ? ?Colorectal Screening ([facility]) on??04/08/19.??Satisfied by Zoe Nugent  ??? ? ? ?Depression Screening on??05/14/19.??Satisfied by Samreen Murray  ??? ? ? ?Diabetes Screening on??11/05/18.??Satisfied by Kim Mcarthur  ??? ? ? ?Fall Risk Assessment on??07/08/19.??Satisfied by Gwendolyn Brush LPN  Get  ??? ? ? ?Functional Assessment on??04/09/19.??Satisfied by Samreen Murray  ??? ? ? ?Geriatric Depression Screening on??05/14/19.??Satisfied by Samreen Murray  ??? ? ? ?Hypertension Management-Blood Pressure on??07/18/19.??Satisfied by Chantal Brush LPN  ??? ? ? ?Influenza Vaccine on??12/28/18.??Satisfied by Gwendolyn Brush LPN  ??? ? ? ?Lipid Screening on??08/06/18.??Satisfied by Abril Rivera  ??? ? ? ?Obesity Screening on??07/18/19.??Satisfied by Chantal Brush LPN  ??? ? ? ?Tetanus Vaccine on??07/25/18.??Satisfied by Qing Bustos LPN  ?      Patient discussed in detail with medicine resident  Blood pressure and cholesterol medicines continued-await repeat lipid?thyroid labs  Agree with remaining assessment and plan

## 2022-06-29 ENCOUNTER — HOSPITAL ENCOUNTER (EMERGENCY)
Facility: HOSPITAL | Age: 77
Discharge: HOME | End: 2022-06-29
Attending: FAMILY MEDICINE
Payer: MEDICARE

## 2022-06-29 VITALS
TEMPERATURE: 99 F | DIASTOLIC BLOOD PRESSURE: 73 MMHG | SYSTOLIC BLOOD PRESSURE: 128 MMHG | RESPIRATION RATE: 16 BRPM | BODY MASS INDEX: 28.63 KG/M2 | HEIGHT: 59 IN | HEART RATE: 93 BPM | OXYGEN SATURATION: 97 % | WEIGHT: 142 LBS

## 2022-06-29 DIAGNOSIS — U07.1 COVID-19: Primary | ICD-10-CM

## 2022-06-29 DIAGNOSIS — R05.9 COUGH: ICD-10-CM

## 2022-06-29 LAB
FLUAV AG UPPER RESP QL IA.RAPID: NOT DETECTED
FLUBV AG UPPER RESP QL IA.RAPID: NOT DETECTED
SARS-COV-2 RNA RESP QL NAA+PROBE: DETECTED
STREP A PCR (OHS): NOT DETECTED

## 2022-06-29 PROCEDURE — 87631 RESP VIRUS 3-5 TARGETS: CPT | Performed by: NURSE PRACTITIONER

## 2022-06-29 PROCEDURE — 87636 SARSCOV2 & INF A&B AMP PRB: CPT | Mod: 59 | Performed by: NURSE PRACTITIONER

## 2022-06-29 PROCEDURE — 99284 EMERGENCY DEPT VISIT MOD MDM: CPT | Mod: 25

## 2022-06-29 RX ORDER — AMLODIPINE BESYLATE 5 MG/1
5 TABLET ORAL DAILY
COMMUNITY
Start: 2022-05-20 | End: 2022-11-15 | Stop reason: SDUPTHER

## 2022-06-29 RX ORDER — PROMETHAZINE HYDROCHLORIDE AND DEXTROMETHORPHAN HYDROBROMIDE 6.25; 15 MG/5ML; MG/5ML
5 SYRUP ORAL EVERY 6 HOURS PRN
Qty: 100 ML | Refills: 0 | Status: SHIPPED | OUTPATIENT
Start: 2022-06-29 | End: 2022-07-04

## 2022-06-29 RX ORDER — FLUTICASONE PROPIONATE 50 MCG
1 SPRAY, SUSPENSION (ML) NASAL 2 TIMES DAILY PRN
Qty: 15 G | Refills: 0 | Status: SHIPPED | OUTPATIENT
Start: 2022-06-29 | End: 2022-08-02

## 2022-06-29 NOTE — DISCHARGE INSTRUCTIONS
Increase fluid intake, use tylenol every 6-8 hours for temperature of 101 or greater.   Follow up with your primary care physician in 3-5 days for follow up evaluation.  Return to the [facility] ED immediately for chest pain, SOB, or fever.

## 2022-06-29 NOTE — ED PROVIDER NOTES
Encounter Date: 6/29/2022       History     Chief Complaint   Patient presents with    Cough     Pt to ed c/o cough, sore throat, fever since Saturday. States cough is non productive.      Pt is a 76 y.o. female who presents to the [facility] ED complaining of cough, sore throat. Symptoms x 3 days. Denies chest pain, SOB, weakness, dizziness, or fever. Denies relief with OTC medications.         Review of patient's allergies indicates:  No Known Allergies  No past medical history on file.  No past surgical history on file.  No family history on file.  Social History     Tobacco Use    Smoking status: Never Smoker    Smokeless tobacco: Never Used   Substance Use Topics    Alcohol use: Never    Drug use: Never     Review of Systems   Constitutional: Negative for chills, diaphoresis, fatigue and fever.   HENT: Positive for sinus pressure and sore throat. Negative for facial swelling, rhinorrhea, sinus pain and trouble swallowing.    Respiratory: Positive for cough. Negative for chest tightness, shortness of breath and wheezing.    Cardiovascular: Negative for chest pain, palpitations and leg swelling.   Gastrointestinal: Negative for abdominal pain, diarrhea, nausea and vomiting.   Genitourinary: Negative for dysuria, flank pain, frequency, hematuria and urgency.   Musculoskeletal: Negative for arthralgias, back pain, joint swelling and myalgias.   Skin: Negative for color change and rash.   Neurological: Negative for dizziness, syncope, weakness and light-headedness.   Hematological: Does not bruise/bleed easily.   All other systems reviewed and are negative.      Physical Exam     Initial Vitals [06/29/22 1006]   BP Pulse Resp Temp SpO2   128/73 93 16 98.7 °F (37.1 °C) 97 %      MAP       --         Physical Exam    Nursing note and vitals reviewed.  Constitutional: She appears well-developed and well-nourished.   HENT:   Head: Normocephalic and atraumatic.   Nose: Mucosal edema and rhinorrhea present.   Mouth/Throat:  Posterior oropharyngeal erythema present. No oropharyngeal exudate or tonsillar abscesses.   Eyes: Conjunctivae and EOM are normal. Pupils are equal, round, and reactive to light.   Neck: Neck supple.   Normal range of motion.  Cardiovascular: Normal rate, regular rhythm, normal heart sounds and intact distal pulses.   Pulmonary/Chest: Effort normal and breath sounds normal. No respiratory distress. She has no wheezes. She has no rhonchi. She has no rales. She exhibits no tenderness.   Dry cough present.     Abdominal: Abdomen is soft and flat. Bowel sounds are normal. She exhibits no distension. There is no abdominal tenderness. There is no rebound, no guarding, no tenderness at McBurney's point and negative Hughes's sign. negative psoas sign  Musculoskeletal:         General: Normal range of motion.      Cervical back: Normal range of motion and neck supple.     Neurological: She is alert and oriented to person, place, and time. She has normal strength and normal reflexes.   Skin: Skin is warm and dry. Capillary refill takes less than 2 seconds.   Psychiatric: She has a normal mood and affect. Her speech is normal and behavior is normal. Judgment and thought content normal.         ED Course   Procedures  Labs Reviewed   COVID/FLU A&B PCR - Abnormal; Notable for the following components:       Result Value    SARS-CoV-2 PCR Detected (*)     All other components within normal limits   STREP GROUP A BY PCR - Normal          Imaging Results          X-Ray Chest 1 View (Final result)  Result time 06/29/22 10:37:06    Final result by Moy Guerra MD (06/29/22 10:37:06)                 Impression:      NO ACUTE CARDIOPULMONARY PROCESS IDENTIFIED.      Electronically signed by: Moy Guerra  Date:    06/29/2022  Time:    10:37             Narrative:    EXAMINATION:  XR CHEST 1 VIEW    CLINICAL HISTORY:  Cough, unspecified    TECHNIQUE:  One view    COMPARISON:  June 23, 2020.    FINDINGS:  Cardiopericardial silhouette  is within normal limits.  No acute dense focal or segmental consolidation, congestive process, pleural effusions or pneumothorax.                                 Medications - No data to display  Medical Decision Making:   History:   Old Medical Records: I decided to obtain old medical records.  Differential Diagnosis:   URI  Pneumonia  COVID  Influenza  Clinical Tests:   Lab Tests: Ordered and Reviewed  Radiological Study: Ordered and Reviewed  ED Management:  11:29 AM Reassessed patient at this time. Reports condition has improved. Discussed with patient all pertinent ED information and results. Discussed diagnosis and treatment plan with patient. Follow up instructions and return to ED instruction have been given. All questions and concerns were addressed at this time. Patient voices understanding of information and instructions. Patient is comfortable with plan and discharge. Patient is stable for discharge.                         Clinical Impression:   Final diagnoses:  [R05.9] Cough  [U07.1] COVID-19 (Primary)          ED Disposition Condition    Discharge Stable        ED Prescriptions     Medication Sig Dispense Start Date End Date Auth. Provider    fluticasone propionate (FLONASE) 50 mcg/actuation nasal spray 1 spray (50 mcg total) by Each Nostril route 2 (two) times daily as needed for Rhinitis. 15 g 6/29/2022  Shabbir Ott Jr., AIDA    promethazine-dextromethorphan (PROMETHAZINE-DM) 6.25-15 mg/5 mL Syrp Take 5 mLs by mouth every 6 (six) hours as needed (cough). 100 mL 6/29/2022 7/4/2022 Shabbir Ott Jr., AIDA        Follow-up Information     Follow up With Specialties Details Why Contact Info    [facility] Emergency Medicine In 3 days As needed, If symptoms worsen [address] [facility] [address] [phone]           AIDA Liang Jr.  06/29/22 7933

## 2022-07-19 ENCOUNTER — OFFICE VISIT (OUTPATIENT)
Dept: OPHTHALMOLOGY | Facility: CLINIC | Age: 77
End: 2022-07-19
Payer: MEDICARE

## 2022-07-19 VITALS — HEIGHT: 59 IN | BODY MASS INDEX: 28.63 KG/M2 | WEIGHT: 142 LBS

## 2022-07-19 DIAGNOSIS — H01.006 BLEPHARITIS OF BOTH EYES, UNSPECIFIED EYELID, UNSPECIFIED TYPE: ICD-10-CM

## 2022-07-19 DIAGNOSIS — H04.123 DRY EYE SYNDROME OF BOTH EYES: ICD-10-CM

## 2022-07-19 DIAGNOSIS — H01.003 BLEPHARITIS OF BOTH EYES, UNSPECIFIED EYELID, UNSPECIFIED TYPE: ICD-10-CM

## 2022-07-19 DIAGNOSIS — H50.00 ESOTROPIA: Primary | ICD-10-CM

## 2022-07-19 DIAGNOSIS — H40.019 OPEN ANGLE GLAUCOMA SUSPECT WITH BORDERLINE FINDINGS AT LOW RISK: ICD-10-CM

## 2022-07-19 PROCEDURE — 99213 OFFICE O/P EST LOW 20 MIN: CPT | Mod: PBBFAC,PO

## 2022-07-19 PROCEDURE — 92083 EXTENDED VISUAL FIELD XM: CPT | Mod: PBBFAC,PO | Performed by: OPHTHALMOLOGY

## 2022-07-19 PROCEDURE — 92083 EXTENDED VISUAL FIELD XM: CPT | Mod: PBBFAC,PO

## 2022-07-19 RX ORDER — DICLOFENAC SODIUM 75 MG/1
TABLET, DELAYED RELEASE ORAL
COMMUNITY
Start: 2021-10-19 | End: 2022-11-15

## 2022-07-19 RX ORDER — LOSARTAN POTASSIUM 50 MG/1
TABLET ORAL
COMMUNITY
Start: 2021-11-08 | End: 2023-01-17 | Stop reason: SDUPTHER

## 2022-07-19 RX ORDER — CYCLOSPORINE 0.5 MG/ML
1 EMULSION OPHTHALMIC
COMMUNITY
End: 2022-08-19 | Stop reason: SDUPTHER

## 2022-07-19 RX ORDER — LEVOTHYROXINE SODIUM 137 UG/1
TABLET ORAL
COMMUNITY
Start: 2022-06-28 | End: 2022-08-12

## 2022-07-19 RX ORDER — AMLODIPINE BESYLATE 5 MG/1
TABLET ORAL
COMMUNITY
Start: 2021-12-15 | End: 2023-03-29 | Stop reason: SDUPTHER

## 2022-07-19 RX ORDER — PRAVASTATIN SODIUM 40 MG/1
TABLET ORAL
COMMUNITY
Start: 2021-12-22 | End: 2023-03-29 | Stop reason: SDUPTHER

## 2022-07-19 NOTE — PROGRESS NOTES
Testing  HVF 7/19/22  - OD: 2/10 FL 0% FP 0% FN, nonspecific scattered deficits  - OS: 1/10 FL 4% FP 0% FN, full field    Assessment/Plan  1. Myopia OU with posterior staphyloma OS, esotropia alternating  - Patient with long standing myopia. Does have hx of thyroid disease but not c/w restrictive disease  - On further questioning she reports her left eye has always been weaker than her right eye  - ~25PD ET in primary gaze today. Alternating  - Good VA OU  - patient having intermittent double vision, interested in surgery  - Patient saw Dr. Woodard 3/10/22 at [facility] for evaluation, had MRI orbits performed before surgical planning; per read, patient has oblong configuration of globes (unable to view images) -- this may be representative of her myopia and staphyloma  - Planned for surgery with Dr. Woodard, will require re-consult as patient was lost to follow-up    2. Blepharitis and Keratoconjunctivitis sicca OU  - With complaint of burning most hours of the day  - Unable to tolerate restasis and xiidra in past; however, as of 3/22/22, patient says she is not having bad side effects  - Failed trial with BCL  - Lotemax helped initially, then symptoms worsened  - Avenova tried without any benefit per patient  - Has punctal plug in place OS, absent OD  - Lab work: SSA, SSB, YAW, RF, ACE/lysozyme, scleroderma, Anti-smith, CRP/ESR all within normal limits  - Current regimen: PFATs q3 hours, WC & LS 1-2 times/day  - Patient was restarted on Restasis end of 1/2022 early 2/2022  - Continue Restasis for 3 month course. If patient does not have significant symptom relief after at least 3 month trial of Restasis, can consider either permanent punctal occlusion and or horizontal lower lid tightening procedure OU  - Will defer lid surgery for now as patient interested in strabismus eval for surgery.    3. Bilateral lower eyelid laxity, trace involutional ectropion  - May be contributing to eye surface symptoms  - Will treat surface  as above & defer any surgery until after Dr. Woodard strabismus jeny    4. Open angle with borderline glaucoma findings, low risk, both eyes  - Based on CDR asymmetry  - CCT thin OS>OD  - Gonio open to CBB  - OCT RNFL 1/25/22: IN thinning OD, full OS  - HVF 7/2022 essentially full with nonspecific deficits OD (does not correlate with OCT thinning)  - Given IOP mid-low teens and tilted disc likely obscuring OCT findings, continue to monitor off drops    5. Combined form of age-related cataract, OD  - NVS  - Re-address once ALMA more stable    6. PSK OS (10/8/20)  - Doing well  - Stable    RTC 2 months for repeat HVF and IOP check

## 2022-07-20 ENCOUNTER — TELEPHONE (OUTPATIENT)
Dept: ENDOCRINOLOGY | Facility: CLINIC | Age: 77
End: 2022-07-20
Payer: MEDICARE

## 2022-07-20 DIAGNOSIS — M81.0 OSTEOPOROSIS, UNSPECIFIED OSTEOPOROSIS TYPE, UNSPECIFIED PATHOLOGICAL FRACTURE PRESENCE: Primary | ICD-10-CM

## 2022-07-20 DIAGNOSIS — E21.0 PRIMARY HYPERPARATHYROIDISM: ICD-10-CM

## 2022-07-20 DIAGNOSIS — E03.9 HYPOTHYROIDISM, UNSPECIFIED TYPE: ICD-10-CM

## 2022-07-20 PROBLEM — H01.003 BLEPHARITIS OF BOTH EYES: Status: ACTIVE | Noted: 2022-07-20

## 2022-07-20 PROBLEM — H01.006 BLEPHARITIS OF BOTH EYES: Status: ACTIVE | Noted: 2022-07-20

## 2022-07-20 PROBLEM — H40.019 OPEN ANGLE GLAUCOMA SUSPECT WITH BORDERLINE FINDINGS AT LOW RISK: Status: ACTIVE | Noted: 2022-07-20

## 2022-07-20 NOTE — TELEPHONE ENCOUNTER
----- Message from Jayne Brush sent at 7/20/2022  7:54 AM CDT -----  Regarding: pT CALLED  #DR STOUT#    Pt called to requested lab orders prior to visit 8/2/22. She would like to come next week to do labs.      [phone] Pt

## 2022-07-27 ENCOUNTER — LAB VISIT (OUTPATIENT)
Dept: LAB | Facility: HOSPITAL | Age: 77
End: 2022-07-27
Attending: INTERNAL MEDICINE
Payer: MEDICARE

## 2022-07-27 DIAGNOSIS — E03.9 HYPOTHYROIDISM, UNSPECIFIED TYPE: ICD-10-CM

## 2022-07-27 DIAGNOSIS — E21.0 PRIMARY HYPERPARATHYROIDISM: ICD-10-CM

## 2022-07-27 DIAGNOSIS — M81.0 OSTEOPOROSIS, UNSPECIFIED OSTEOPOROSIS TYPE, UNSPECIFIED PATHOLOGICAL FRACTURE PRESENCE: ICD-10-CM

## 2022-07-27 LAB
ALBUMIN SERPL-MCNC: 3.4 GM/DL (ref 3.4–4.8)
BUN SERPL-MCNC: 10.7 MG/DL (ref 9.8–20.1)
CALCIUM SERPL-MCNC: 10.1 MG/DL (ref 8.4–10.2)
CHLORIDE SERPL-SCNC: 109 MMOL/L (ref 98–107)
CO2 SERPL-SCNC: 24 MMOL/L (ref 23–31)
CREAT SERPL-MCNC: 0.82 MG/DL (ref 0.55–1.02)
DEPRECATED CALCIDIOL+CALCIFEROL SERPL-MC: 28.7 NG/ML (ref 30–80)
GLUCOSE SERPL-MCNC: 81 MG/DL (ref 82–115)
PHOSPHATE SERPL-MCNC: 2.6 MG/DL (ref 2.3–4.7)
POTASSIUM SERPL-SCNC: 3.9 MMOL/L (ref 3.5–5.1)
SODIUM SERPL-SCNC: 141 MMOL/L (ref 136–145)
TSH SERPL-ACNC: 1.52 UIU/ML (ref 0.35–4.94)

## 2022-07-27 PROCEDURE — 84443 ASSAY THYROID STIM HORMONE: CPT

## 2022-07-27 PROCEDURE — 80069 RENAL FUNCTION PANEL: CPT

## 2022-07-27 PROCEDURE — 82306 VITAMIN D 25 HYDROXY: CPT

## 2022-07-27 PROCEDURE — 36415 COLL VENOUS BLD VENIPUNCTURE: CPT

## 2022-08-02 ENCOUNTER — OFFICE VISIT (OUTPATIENT)
Dept: ENDOCRINOLOGY | Facility: CLINIC | Age: 77
End: 2022-08-02
Payer: MEDICARE

## 2022-08-02 VITALS
TEMPERATURE: 98 F | DIASTOLIC BLOOD PRESSURE: 75 MMHG | HEIGHT: 59 IN | BODY MASS INDEX: 28.63 KG/M2 | RESPIRATION RATE: 18 BRPM | HEART RATE: 72 BPM | SYSTOLIC BLOOD PRESSURE: 122 MMHG | WEIGHT: 142 LBS

## 2022-08-02 DIAGNOSIS — E55.9 VITAMIN D DEFICIENCY: ICD-10-CM

## 2022-08-02 DIAGNOSIS — E06.3 HASHIMOTO'S THYROIDITIS: ICD-10-CM

## 2022-08-02 DIAGNOSIS — M81.0 OSTEOPOROSIS, UNSPECIFIED OSTEOPOROSIS TYPE, UNSPECIFIED PATHOLOGICAL FRACTURE PRESENCE: ICD-10-CM

## 2022-08-02 DIAGNOSIS — E21.0 PRIMARY HYPERPARATHYROIDISM: Primary | ICD-10-CM

## 2022-08-02 PROCEDURE — 99214 OFFICE O/P EST MOD 30 MIN: CPT | Mod: PBBFAC

## 2022-08-02 PROCEDURE — 96372 THER/PROPH/DIAG INJ SC/IM: CPT | Mod: PBBFAC

## 2022-08-02 RX ADMIN — DENOSUMAB 60 MG: 60 INJECTION SUBCUTANEOUS at 09:08

## 2022-08-02 NOTE — PROGRESS NOTES
"[facility] End[facility]  OUTPATIENT OFFICE VISIT NOTE    SUBJECTIVE:   CC: Osteoporosis    HPI: Mikala Thompson is a 76 y.o. female w/ PMH of HLD, hyperparathyroidism, hypercalcemia, hypothyroidism, OA of hand/knees, osteopenia and HTN who presents to endocrine clinic today for follow up. Patient says she is compliant with her levothyroxine, vitamin d (500 iu) and calcium (500 mg) supplementation, as well as her other medications. Patient is of good health, no complaints of constipation, diarrhea, heat intolerance, cold intolerance or palpitations. No recent falls or dizziness. Pts only new complaint is bilateral swelling of the knees with mild pain (2/10).    ROS:  Constitutional: No nausea, vomiting, headache or fever  Eye: No vision loss, blurred vision, or changes in vision  Respiratory:   No cough, shortness of breath,   Cardiovascular: No chest pain, palpitations,   Gastrointestinal: No changes in bowel habits; no constipation, or diarrhea, no bloody or black tarry stools  Genitourinary: No pain on urination, increased urgency, or blood in urine  Musculoskeletal: Bilateral knee swelling with mild joint pain (2/10)  Integumentary: No skin rash or abnormal lesion      OBJECTIVE:     Vital signs:   /75 (BP Location: Right arm, Patient Position: Sitting, BP Method: Medium (Automatic))   Pulse 72   Temp 97.7 °F (36.5 °C) (Oral)   Resp 18   Ht 4' 10.66" (1.49 m)   Wt 64.4 kg (142 lb)   BMI 29.01 kg/m²      Physical Examination:  General:  Well-nourished, well-developed, no acute distress, on room air  HEENT: Normocephalic, atraumatic. EOMI.  MMM  Neck: Supple. No obvious JVD.  Normal range of motion.  Respiratory: CTAB.  No obvious crackles wheeze or rhonchi.  Normal work of breathing.  Cardiovascular:  RRR.  No obvious M/G/R. Warm extremities.  Peripheral pulses intact.  No edema.  Gastrointestinal:  Soft, nontender, nondistended.  Normal bowel sounds.  Neurologic: Alert and oriented.  Answering " questions/following commands appropriately.  No FND      Current Outpatient Medications:     amLODIPine (NORVASC) 5 MG tablet, Take 5 mg by mouth once daily., Disp: , Rfl:     cycloSPORINE (RESTASIS) 0.05 % ophthalmic emulsion, Apply 1 drop to eye., Disp: , Rfl:     levothyroxine (SYNTHROID) 137 MCG Tab tablet, Take by mouth., Disp: , Rfl:     losartan (COZAAR) 50 MG tablet, , Disp: , Rfl:     pravastatin (PRAVACHOL) 40 MG tablet,  See Instructions, Take 1 tablet by mouth once daily, # 90 tab(s), 5 Refill(s), Pharmacy: [id number], 150, cm, Height/Length Dosing, 02/01/22 10:09:00 CST, 64.75, kg, Weight Dosing, 02/01/22 10:09:00 CST, Disp: , Rfl:     amLODIPine (NORVASC) 5 MG tablet,  See Instructions, Take 1 tablet by mouth once daily, # 90 tab(s), 7 Refill(s), Pharmacy: [id number], 150, cm, Height/Length Dosing, 02/01/22 10:09:00 CST, 64.75, kg, Weight Dosing, 02/01/22 10:09:00 CST, Disp: , Rfl:     diclofenac (VOLTAREN) 75 MG EC tablet, , Disp: , Rfl:     fluticasone propionate (FLONASE) 50 mcg/actuation nasal spray, 1 spray (50 mcg total) by Each Nostril route 2 (two) times daily as needed for Rhinitis., Disp: 15 g, Rfl: 0    Current Facility-Administered Medications:     denosumab (PROLIA) injection 60 mg, 60 mg, Subcutaneous, 1 time in Clinic/HOD, Mike Amado MD     ASSESSMENT & PLAN:     Osteoporosis:  -Dexa (11/2020)The L spine (L1-L4) BMD is 1.168, this corresponds to a T score of -0.2, The total left femoral BMD is 0.731, this corresponds to a T score of -2.2. The total right femoral BMD is 0.710, this corresponds to T score of-2.4.  -Was given Prolia injection in February and will receive an in-clinic injection today (8/2/22). Patient should follow up in 6 months for repeat injection     Hypothyroidism:  -Continue levothyroxine 68.5 mcg, TSH is 1.51.   -Will continue to monitor TSH and titrate accordingly     Primary Hyperparathyroidism:  -Continue Prolia  injections  -Patient is not interested in surgical intervention, continue follow up calcium levels    OA  - Patient should follow up with PCP for complaint of knee pain and swelling      RTC in 6 months with repeated labs (TSH, vitamin D, renal function)    Anushka Carbone, MS3

## 2022-08-12 RX ORDER — LEVOTHYROXINE SODIUM 137 UG/1
TABLET ORAL
Qty: 45 TABLET | Refills: 2 | Status: SHIPPED | OUTPATIENT
Start: 2022-08-12 | End: 2023-03-23

## 2022-08-19 DIAGNOSIS — H04.123 DRY EYE SYNDROME OF BOTH EYES: Primary | ICD-10-CM

## 2022-08-19 RX ORDER — CYCLOSPORINE 0.5 MG/ML
1 EMULSION OPHTHALMIC 2 TIMES DAILY
Qty: 30 EACH | Refills: 2 | Status: SHIPPED | OUTPATIENT
Start: 2022-08-19 | End: 2022-11-15

## 2022-09-16 ENCOUNTER — TELEPHONE (OUTPATIENT)
Dept: INTERNAL MEDICINE | Facility: CLINIC | Age: 77
End: 2022-09-16

## 2022-09-20 DIAGNOSIS — M25.539 PAIN IN WRIST, UNSPECIFIED LATERALITY: Primary | ICD-10-CM

## 2022-09-20 DIAGNOSIS — M25.569 KNEE PAIN, UNSPECIFIED CHRONICITY, UNSPECIFIED LATERALITY: Primary | ICD-10-CM

## 2022-09-26 ENCOUNTER — OFFICE VISIT (OUTPATIENT)
Dept: ORTHOPEDICS | Facility: CLINIC | Age: 77
End: 2022-09-26
Payer: MEDICARE

## 2022-09-26 ENCOUNTER — HOSPITAL ENCOUNTER (OUTPATIENT)
Dept: RADIOLOGY | Facility: HOSPITAL | Age: 77
Discharge: HOME | End: 2022-09-26
Attending: STUDENT IN AN ORGANIZED HEALTH CARE EDUCATION/TRAINING PROGRAM
Payer: MEDICARE

## 2022-09-26 VITALS
DIASTOLIC BLOOD PRESSURE: 69 MMHG | WEIGHT: 142 LBS | RESPIRATION RATE: 18 BRPM | HEIGHT: 58 IN | HEART RATE: 73 BPM | BODY MASS INDEX: 29.81 KG/M2 | SYSTOLIC BLOOD PRESSURE: 113 MMHG | OXYGEN SATURATION: 98 %

## 2022-09-26 DIAGNOSIS — M17.0 OSTEOARTHRITIS OF BOTH KNEES, UNSPECIFIED OSTEOARTHRITIS TYPE: ICD-10-CM

## 2022-09-26 DIAGNOSIS — M25.561 CHRONIC PAIN OF RIGHT KNEE: ICD-10-CM

## 2022-09-26 DIAGNOSIS — G89.29 CHRONIC PAIN OF RIGHT KNEE: ICD-10-CM

## 2022-09-26 DIAGNOSIS — G89.29 CHRONIC PAIN OF LEFT KNEE: ICD-10-CM

## 2022-09-26 DIAGNOSIS — M25.562 CHRONIC PAIN OF LEFT KNEE: ICD-10-CM

## 2022-09-26 DIAGNOSIS — M25.569 KNEE PAIN, UNSPECIFIED CHRONICITY, UNSPECIFIED LATERALITY: Primary | ICD-10-CM

## 2022-09-26 PROCEDURE — 73564 X-RAY EXAM KNEE 4 OR MORE: CPT | Mod: TC,RT

## 2022-09-26 PROCEDURE — 73564 X-RAY EXAM KNEE 4 OR MORE: CPT | Mod: TC,LT

## 2022-09-26 PROCEDURE — 20610 DRAIN/INJ JOINT/BURSA W/O US: CPT | Mod: 50,PBBFAC | Performed by: STUDENT IN AN ORGANIZED HEALTH CARE EDUCATION/TRAINING PROGRAM

## 2022-09-26 PROCEDURE — 99215 OFFICE O/P EST HI 40 MIN: CPT | Mod: PBBFAC

## 2022-09-26 RX ORDER — TRIAMCINOLONE ACETONIDE 40 MG/ML
40 INJECTION, SUSPENSION INTRA-ARTICULAR; INTRAMUSCULAR
Status: COMPLETED | OUTPATIENT
Start: 2022-09-26 | End: 2022-09-26

## 2022-09-26 RX ORDER — LIDOCAINE HYDROCHLORIDE 10 MG/ML
5 INJECTION, SOLUTION EPIDURAL; INFILTRATION; INTRACAUDAL; PERINEURAL ONCE
Status: COMPLETED | OUTPATIENT
Start: 2022-09-26 | End: 2022-09-26

## 2022-09-26 RX ADMIN — TRIAMCINOLONE ACETONIDE 40 MG: 40 INJECTION, SUSPENSION INTRA-ARTICULAR; INTRAMUSCULAR at 09:09

## 2022-09-26 RX ADMIN — TRIAMCINOLONE ACETONIDE 40 MG: 40 INJECTION, SUSPENSION INTRA-ARTICULAR; INTRAMUSCULAR at 10:09

## 2022-09-26 NOTE — PROGRESS NOTES
"Subjective:    Patient ID: Mikala Thompson is a 76 y.o. female  who presented to [facility] for new visit..      Chief Complaint: Pain of the Left Knee and Pain of the Right Knee      History of Present Illness:  HPI    Mikala Thompson who has a history of Osteoarthritis of bilateral knees  presented today with with knee pain and swelling involving both knees for the past 5 years. Pain is located anterior. Quality of pain is described as  sharp and achy dull .  Inciting event: none known. Pain is aggravated by  walking and prolonged sitting .  Patient has had no prior knee problems. Evaluation to date: plain films. Treatment to date:  Tylenol, Ibuprofen, ice/heat, PT (last year), CSI, last injection 04/2021 . Expectations for today's visit includes pain relief.  Occupation includes retired. PCP is Dr. Nahid Lazcano.      Knee Review of Systems:  Swelling?  yes  Instability?  yes  Mechanical sx?  no  <30 min AM stiffness? yes  Limited ROM? no  Fever/Chills? no    Current Choice of Exercise:  Walking    ROS       Objective:      Physical Exam:    /69 (BP Location: Left arm, Patient Position: Sitting)   Pulse 73   Resp 18   Ht 4' 10" (1.473 m)   Wt 64.4 kg (142 lb)   SpO2 98%   BMI 29.68 kg/m²     Ortho/SPM Exam    Appearance:  Normal gait/station  FWB  Alignment: Left: normal Right: normal   Soft tissue swelling: Left: no Right: no  Effusion: Left:  Positive Right: Positive  Erythema: Left no Right: no  Ecchymosis: Left: no Right: no  Atrophy: Left: no Right: no    Palpation:  Knee Tenderness: Left: Medial joint line Right: Medial joint line     Range of motion:  Flexion (140): Left:  120 Right: 120  Extension (0): Left: 0 Right: 0    Strength:  Extension: Left 5/5  Pain: no     Right 5/5 Pain: no  Flexion: Left 5/5 Pain: no Right   5/5 Pain: no        Special Tests:  Ballotable Effusion:Left: Negative Right: Negative   Fluid Wave: Left: Negative Right: " Negative   Crepitus: Left: Positive Right: Positive  Patellar grind test: Left: Negative  Right: Negative  Apprehension test: Left: Negative Right: Negative   Varus: @ 0, Left Negative Right: Negative.  @ 30, Left Negative  Right Negative   Valgus: @ 0, Left Negative Right: Negative.  @ 30, Left Negative  Right Negative  Lachman: Left: Negative Right: Negative   Ant Drawer: Left: Negative Right: Negative   Posterior Drawer: Left: Negative Right: Negative   Dial Test: Left: Not performed Right: Not performed   Payam: Left: Negative Right: Negative   Apley's: Left: Not performed Right: Not performed  Thessaly's: Left: Not performed Right: Not performed   Noble Compression: Left: Not performed Right: Not performed   Marcial: Left: Not performed Right: Not performed       General appearance: NAD  Peripheral pulses: normal bilaterally   Reflexes: Left: normal Right normal   Sensation: normal    Labs:  Last A1c: The patient doesn't have any registry metric data available     Imaging:   Previous images reviewed.  X-rays ordered and performed today: yes  # of views: 4 Laterality: bilateral  My Interpretation: No fracture, dislocation, swelling. Degenerative changes notes.       Assessment:        Encounter Diagnoses   Name Primary?    Knee pain, unspecified chronicity, unspecified laterality     Chronic pain of right knee Yes    Chronic pain of left knee     Osteoarthritis of both knees, unspecified osteoarthritis type           Plan:           Orders Placed This Encounter   Procedures    X-Ray Knee Complete 4 Or More Views Right     Standing Status:   Future     Number of Occurrences:   1     Standing Expiration Date:   9/26/2023     Order Specific Question:   May the Radiologist modify the order per protocol to meet the clinical needs of the patient?     Answer:   Yes     Order Specific Question:   Release to patient     Answer:   Immediate    X-Ray Knee Complete 4 or More Views Left     Standing Status:   Future      Number of Occurrences:   1     Standing Expiration Date:   9/26/2023     Order Specific Question:   May the Radiologist modify the order per protocol to meet the clinical needs of the patient?     Answer:   Yes     Order Specific Question:   Release to patient     Answer:   Immediate     Medications Ordered This Encounter   Medications    LIDOcaine (PF) 10 mg/ml (1%) injection 50 mg    LIDOcaine (PF) 10 mg/ml (1%) injection 50 mg    triamcinolone acetonide injection 40 mg    triamcinolone acetonide injection 40 mg       Dx: bilateral Knee Osteoarthritis. Acute in moderate exacerbation.   Treatment Plan: Discussed with patient diagnosis, prognosis, and treatment recommendations. Education provided.    Home physical therapy exercise handouts provided to patient.   topical hot or cold therapy  Over the counter NSAID and/or tylenol provided you do not have contraindications such as but not limited to liver or kidney disease or uncontrolled blood pressure. If you're doctors have told you to to not take them based on your health, do not take them.   oral glucosamine 1500 mg/day.  topical capsaicin as needed  Using a brace provided to you here or from your local pharmacy or durable medical equipment (DME) store.   HEP.  Imaging: radiological studies ordered and independently reviewed; discussed with patient; pending radiologist interpretation.   Weight Management: is paramount. recommend at least 10% of total body weight loss if your bmi is 30-34.9. A bmi 24.9 or less may provide further relief..   Procedure: Discussed CSI/VSI as treatment options; discussed CSI vs VS injections as treatment options; since conservative measures did not improve symptoms patient consented for CSI today.  Activity: Activity as tolerated; HEP to include aerobic conditioning and strength training with non-painful activity. ROM/STG exercises. Proper footware; assistive devises to avoid limping.   Therapy: HEP, with handout provided   Medication:  first line treatment with daily acetaminophen. Up to 1000 mg three times daily can be taken; medication precautions given.. Please see your primary care physician for further refills.  RTC: PRN; call if any issues.               [facility] Procedure Note     Date: 09/26/2022  Time:  8:50 AM CDT     Procedure: bilateral Knee Injection     Indications: Therapeutic Indication - Decrease pain, Increase range of motion and improve quality of life:   Risks:  Possible complications with the injection include bleeding, infection (.01%), tendon rupture, steroid flare, fat pad or soft tissue atrophy, skin depigmentation, allergic reaction to medications and vasovagal response. (steroid flare treatment is rest, ice, NSAIDs and resolves in 24-36 hours.)  Consent:  Procedure, risks, benefits, and alternatives explained the patient, who voiced understanding and agreed to proceed with procedure.  Consent signed and scanned into the medical record.  No absolute contraindications (cellulitis overlying joint, infection, lack of informed consent, allergy to injection medication, AVN protein or egg allergy for sodium hyaluronate, or history of steroid flare) or relative contraindications (uncontrolled DM2 A1c>10, coagulopathy, INR > 3.5, previous joint replacement or history of AVN).        Staff: Kade Bahena MD   Description:  Time-out performed.  The patient was prepped in normal sterile fashion use of chlorhexidine scrub and the appropriate and anatomic landmarks were identified without ultrasound.  Sterile 21 gauge 1.5 inch  was used. Topical ethyl chloride was applied. Contents of syringe included: 5cc of 1% of lidocaine with 40mg of Kenalog   Complications: None   EBL: None   Post Procedure: Patient alert, and moving all extremities. ROM improved, pain decreased.  Good peripheral pulses, no signs of vascular compromise and range of motion intact.  Aftercare instructions were given to patient at time of discharge.  Relative rest for  3 days-avoiding excess activity.  Place ice on the area for 15 minutes every 4-6 hours. Patient may take Tylenol a 1000 mg b.i.d. or ibuprofen 600 mg t.i.d. for the next 3-4 days if not on medication already and safe to take pending co-morbidities.  Protect the area for the next 1-8 hours if anesthetic was used.  Avoid excessive activity for the next 3-4 weeks.  ER precautions given for fever, severe joint pain or allergic reaction or other new symptoms related to the joint injection.

## 2022-10-06 ENCOUNTER — OFFICE VISIT (OUTPATIENT)
Dept: GYNECOLOGY | Facility: CLINIC | Age: 77
End: 2022-10-06
Payer: MEDICARE

## 2022-10-06 VITALS
TEMPERATURE: 98 F | WEIGHT: 142.38 LBS | HEIGHT: 59 IN | RESPIRATION RATE: 18 BRPM | SYSTOLIC BLOOD PRESSURE: 141 MMHG | DIASTOLIC BLOOD PRESSURE: 77 MMHG | HEART RATE: 71 BPM | BODY MASS INDEX: 28.7 KG/M2 | OXYGEN SATURATION: 98 %

## 2022-10-06 DIAGNOSIS — Z12.31 VISIT FOR SCREENING MAMMOGRAM: ICD-10-CM

## 2022-10-06 DIAGNOSIS — Z01.419 ENCOUNTER FOR ANNUAL ROUTINE GYNECOLOGICAL EXAMINATION: Primary | ICD-10-CM

## 2022-10-06 PROCEDURE — 1160F PR REVIEW ALL MEDS BY PRESCRIBER/CLIN PHARMACIST DOCUMENTED: ICD-10-PCS | Mod: CPTII,,, | Performed by: NURSE PRACTITIONER

## 2022-10-06 PROCEDURE — 1160F RVW MEDS BY RX/DR IN RCRD: CPT | Mod: CPTII,,, | Performed by: NURSE PRACTITIONER

## 2022-10-06 PROCEDURE — G0101 PR CA SCREEN;PELVIC/BREAST EXAM: ICD-10-PCS | Mod: S$PBB,,, | Performed by: NURSE PRACTITIONER

## 2022-10-06 PROCEDURE — 1159F PR MEDICATION LIST DOCUMENTED IN MEDICAL RECORD: ICD-10-PCS | Mod: CPTII,,, | Performed by: NURSE PRACTITIONER

## 2022-10-06 PROCEDURE — 99214 OFFICE O/P EST MOD 30 MIN: CPT | Mod: PBBFAC | Performed by: NURSE PRACTITIONER

## 2022-10-06 PROCEDURE — 1159F MED LIST DOCD IN RCRD: CPT | Mod: CPTII,,, | Performed by: NURSE PRACTITIONER

## 2022-10-06 PROCEDURE — G0101 CA SCREEN;PELVIC/BREAST EXAM: HCPCS | Mod: S$PBB,,, | Performed by: NURSE PRACTITIONER

## 2022-10-06 NOTE — PROGRESS NOTES
"  Subjective:       Patient ID: Mikala Thompson is a 76 y.o. female.    Chief Complaint:  Well Woman    History of Present Illness  Pt is  here for annual gyn. Pt is postmenopausal. Denies vaginal bleeding or discharge. Denies urinary complaints and hot flashes. Denies hx of abnormal pap. Pt had pap smears done here , , . All negative for lesion or malignancy. Last pap 2015-NIL and HPV (-). MG-21-BIRADS 1. Denies mass, skin changes, nipple discharge. Pt denies fly hx of breast, ovarian, uterine or colon cancer. DEXA -osteoporosis, Alendronate stopped d/t GI adverse effects an started on Prolia. Pt does not use tobacco. No complaints today.    GYN & OB History  No LMP recorded. Patient is postmenopausal.     Review of patient's allergies indicates:  No Known Allergies  Past Medical History:   Diagnosis Date    Amblyopia     Cataract     Glaucoma     Hyperlipidemia     Hypertension     Strabismus      OB History    Para Term  AB Living   4 4           SAB IAB Ectopic Multiple Live Births                  # Outcome Date GA Lbr Paras/2nd Weight Sex Delivery Anes PTL Lv   4 Para            3 Para            2 Para            1 Para                 Review of Systems  Review of Systems    Negative except for pertinent findings for positives per HPI     Objective:    Physical Exam    BP (!) 141/77 (BP Location: Left arm, Patient Position: Sitting, BP Method: Medium (Automatic))   Pulse 71   Temp 97.5 °F (36.4 °C)   Resp 18   Ht 4' 11" (1.499 m)   Wt 64.6 kg (142 lb 6.4 oz)   SpO2 98%   BMI 28.76 kg/m²   GENERAL: Well-developed female in no acute distress.  SKIN: Normal to inspection, warm and intact.  BREASTS: No masses, lumps, discharge, tenderness.  VULVA: General appearance normal; external genitalia with no lesions or erythema.  VAGINA: Mucosa atrophic, no discharge or lesions.  CERVIX: Atrophic, no erythema or discharge.  BIMANUAL EXAM: reveals 6 week-sized uterus. The " uterus non tender. Derrek adnexa reveal no evidence of masses, tenderness.  PSYCHIATRIC: Patient is oriented to person, place, and time. Mood and affect are normal.    Assessment:       1. Encounter for annual routine gynecological examination    2. Visit for screening mammogram         Plan:   Mikala was seen today for well woman.    Diagnoses and all orders for this visit:    Encounter for annual routine gynecological examination    Visit for screening mammogram  -     Mammo Digital Screening Bilat; Future  Pelvic exam, pap deferred d/t age over 65 and adequate screening  Pt would like to continue with annual MG.  Follow up in about 1 year (around 10/6/2023) for annual exam.

## 2022-10-11 ENCOUNTER — OFFICE VISIT (OUTPATIENT)
Dept: OTOLARYNGOLOGY | Facility: CLINIC | Age: 77
End: 2022-10-11
Payer: MEDICARE

## 2022-10-11 VITALS
SYSTOLIC BLOOD PRESSURE: 113 MMHG | HEIGHT: 59 IN | TEMPERATURE: 98 F | DIASTOLIC BLOOD PRESSURE: 77 MMHG | BODY MASS INDEX: 28.5 KG/M2 | HEART RATE: 80 BPM | WEIGHT: 141.38 LBS

## 2022-10-11 DIAGNOSIS — R42 DIZZINESS: Primary | ICD-10-CM

## 2022-10-11 DIAGNOSIS — H61.21 IMPACTED CERUMEN OF RIGHT EAR: ICD-10-CM

## 2022-10-11 DIAGNOSIS — H90.3 SENSORINEURAL HEARING LOSS (SNHL) OF BOTH EARS: ICD-10-CM

## 2022-10-11 PROCEDURE — 1126F PR PAIN SEVERITY QUANTIFIED, NO PAIN PRESENT: ICD-10-PCS | Mod: CPTII,,, | Performed by: NURSE PRACTITIONER

## 2022-10-11 PROCEDURE — 1101F PT FALLS ASSESS-DOCD LE1/YR: CPT | Mod: CPTII,,, | Performed by: NURSE PRACTITIONER

## 2022-10-11 PROCEDURE — 69210 REMOVE IMPACTED EAR WAX UNI: CPT | Mod: PBBFAC,RT | Performed by: NURSE PRACTITIONER

## 2022-10-11 PROCEDURE — 99213 PR OFFICE/OUTPT VISIT, EST, LEVL III, 20-29 MIN: ICD-10-PCS | Mod: S$PBB,,, | Performed by: NURSE PRACTITIONER

## 2022-10-11 PROCEDURE — 99213 OFFICE O/P EST LOW 20 MIN: CPT | Mod: S$PBB,,, | Performed by: NURSE PRACTITIONER

## 2022-10-11 PROCEDURE — 3078F PR MOST RECENT DIASTOLIC BLOOD PRESSURE < 80 MM HG: ICD-10-PCS | Mod: CPTII,,, | Performed by: NURSE PRACTITIONER

## 2022-10-11 PROCEDURE — 1159F MED LIST DOCD IN RCRD: CPT | Mod: CPTII,,, | Performed by: NURSE PRACTITIONER

## 2022-10-11 PROCEDURE — 3074F SYST BP LT 130 MM HG: CPT | Mod: CPTII,,, | Performed by: NURSE PRACTITIONER

## 2022-10-11 PROCEDURE — 1126F AMNT PAIN NOTED NONE PRSNT: CPT | Mod: CPTII,,, | Performed by: NURSE PRACTITIONER

## 2022-10-11 PROCEDURE — 1101F PR PT FALLS ASSESS DOC 0-1 FALLS W/OUT INJ PAST YR: ICD-10-PCS | Mod: CPTII,,, | Performed by: NURSE PRACTITIONER

## 2022-10-11 PROCEDURE — 3078F DIAST BP <80 MM HG: CPT | Mod: CPTII,,, | Performed by: NURSE PRACTITIONER

## 2022-10-11 PROCEDURE — 3288F FALL RISK ASSESSMENT DOCD: CPT | Mod: CPTII,,, | Performed by: NURSE PRACTITIONER

## 2022-10-11 PROCEDURE — 99214 OFFICE O/P EST MOD 30 MIN: CPT | Mod: PBBFAC | Performed by: NURSE PRACTITIONER

## 2022-10-11 PROCEDURE — 3074F PR MOST RECENT SYSTOLIC BLOOD PRESSURE < 130 MM HG: ICD-10-PCS | Mod: CPTII,,, | Performed by: NURSE PRACTITIONER

## 2022-10-11 PROCEDURE — 1159F PR MEDICATION LIST DOCUMENTED IN MEDICAL RECORD: ICD-10-PCS | Mod: CPTII,,, | Performed by: NURSE PRACTITIONER

## 2022-10-11 PROCEDURE — 3288F PR FALLS RISK ASSESSMENT DOCUMENTED: ICD-10-PCS | Mod: CPTII,,, | Performed by: NURSE PRACTITIONER

## 2022-10-11 NOTE — PROGRESS NOTES
[facility]  Otolaryngology Clinic Note    Mikala Thompson  Encounter Date: 10/11/2022  YOB: 1945    Chief Complaint: dizziness    HPI: 4/12/21: 75yoF referred for dizziness. Is having dizzy episodes ~1-2x/day which began 1/2021. Describes as feeling is as if balance is off and lightheaded; is unsure what exacerbates this. Also has occasional brief room spinning when she turns over in the bed. Episodes last for a few seconds. Reports having normal orthostatic VS at last PCP visit. Denies appreciable HL or tinnitus. Denies medication change. Endorses falling on either side of her body in June of 2020 and January of 2021.    5/26/21: Reports dizziness has been greatly improved since Epley and with home maneuvers. Requesting to have her ears cleaned. She uses qtips to clean them at home.    6/17/21: Denies otorrhea or otalgia. Continues to do well from a dizziness standpoint.    10/11/2022: Reports she began having vertiginous dizziness again 4-6 weeks ago which is similar to her previous BPPV. She has not had any falls or medication changes. She is wondering if it is because she has ear wax buildup.     ROS:   10-point review of systems negative except per HPI      Review of patient's allergies indicates:  No Known Allergies    Past Medical History:   Diagnosis Date    Amblyopia     Cataract     Glaucoma     Hyperlipidemia     Hypertension     Osteoarthritis     Strabismus        Past Surgical History:   Procedure Laterality Date    CATARACT EXTRACTION         Social History     Socioeconomic History    Marital status: Single   Tobacco Use    Smoking status: Never    Smokeless tobacco: Never   Substance and Sexual Activity    Alcohol use: Never    Drug use: Never    Sexual activity: Not Currently     Partners: Male     Birth control/protection: Post-menopausal       Family History   Problem Relation Age of Onset    Cancer Maternal Uncle     Diabetes Maternal Grandmother   "      Outpatient Encounter Medications as of 10/11/2022   Medication Sig Dispense Refill    amLODIPine (NORVASC) 5 MG tablet Take 5 mg by mouth once daily.      amLODIPine (NORVASC) 5 MG tablet   See Instructions, Take 1 tablet by mouth once daily, # 90 tab(s), 7 Refill(s), Pharmacy: [id number], 150, cm, Height/Length Dosing, 02/01/22 10:09:00 CST, 64.75, kg, Weight Dosing, 02/01/22 10:09:00 CST      cycloSPORINE (RESTASIS) 0.05 % ophthalmic emulsion Place 1 drop into both eyes 2 (two) times daily. 30 each 2    diclofenac (VOLTAREN) 75 MG EC tablet       levothyroxine (SYNTHROID) 137 MCG Tab tablet Take 1/2 (one-half) tablet by mouth once daily 45 tablet 2    losartan (COZAAR) 50 MG tablet       pravastatin (PRAVACHOL) 40 MG tablet   See Instructions, Take 1 tablet by mouth once daily, # 90 tab(s), 5 Refill(s), Pharmacy: [id number], 150, cm, Height/Length Dosing, 02/01/22 10:09:00 CST, 64.75, kg, Weight Dosing, 02/01/22 10:09:00 CST       No facility-administered encounter medications on file as of 10/11/2022.       Physical Exam:  Vitals:    10/11/22 0858   BP: 113/77   BP Location: Right arm   Patient Position: Sitting   BP Method: Medium (Automatic)   Pulse: 80   Temp: 97.8 °F (36.6 °C)   TempSrc: Oral   Weight: 64.1 kg (141 lb 6.4 oz)   Height: 4' 11" (1.499 m)     General: NAD, voice normal  Neuro: AAO, CN II - XII grossly intact  Head/ Face: NCAT, symmetric, sensations intact bilaterally  Eyes: EOMI, PERRL  Ears: externally normal with grossly normal hearing  AD: EAC with some dry, flaky cerumen- removed using alligator forceps under microscopy to reveal TM intact w/o effusion or retraction  AS: EAC patent. TM intact w/o effusion or retraction  Nose: bilateral nares patent, midline septum, no rhinorrhea, no external deformity, no turbinate hypertrophy  OC/OP: MMM, no intraoral lesions, no trismus, dentition is poor, no uvular deviation, bilaterally symmetric soft palate elevation, " palatoglossus and palatopharyngeal fold wnl; tonsils are symmetric and 1+  Indirect laryngoscopy: deferred due to patient intolerance  Neck: soft, supple, no LAD, normal ROM, no thyromegaly  Respiratory: nonlabored, no wheezing, bilateral chest rise  Cardiovascular: RRR  Gastrointestinal: S NT ND  Skin: warm, no lesions  Musculoskeletal: 5/5 strength  Psych: Appropriate affect/mood       Assessment/Plan:  77 y.o. female with hx of BPPV of right ear which resolved following Epley, now with return of acute symptoms. Audio with moderate SNHL of high frequencies b/l.   - Annual audio, [facility]  - RTC 4-6 weeks        Annalisa Mack NP

## 2022-11-01 ENCOUNTER — CLINICAL SUPPORT (OUTPATIENT)
Dept: AUDIOLOGY | Facility: HOSPITAL | Age: 77
End: 2022-11-01
Payer: MEDICARE

## 2022-11-01 DIAGNOSIS — H81.11 BPPV (BENIGN PAROXYSMAL POSITIONAL VERTIGO), RIGHT: Primary | ICD-10-CM

## 2022-11-01 DIAGNOSIS — R42 DIZZINESS: ICD-10-CM

## 2022-11-01 DIAGNOSIS — H90.3 SENSORINEURAL HEARING LOSS (SNHL) OF BOTH EARS: ICD-10-CM

## 2022-11-01 PROCEDURE — 92557 COMPREHENSIVE HEARING TEST: CPT | Performed by: AUDIOLOGIST-HEARING AID FITTER

## 2022-11-01 PROCEDURE — 92567 TYMPANOMETRY: CPT | Performed by: AUDIOLOGIST-HEARING AID FITTER

## 2022-11-01 PROCEDURE — 95992 CANALITH REPOSITIONING PROC: CPT | Performed by: AUDIOLOGIST-HEARING AID FITTER

## 2022-11-01 NOTE — PROGRESS NOTES
Audiological Testing    Patient History:    Patient evaluated today to assess hearing.  She reportedly does not perceive any changes in hearing since the previous evaluation in 2021.   Tinnitus, otalgia, otorrhea and a history of middle ear involvement/otologic procedures have been denied at this time.  She also endorses episodic vertigo with head turns and movement towards the right.  This sensation is similar to BPPV diagnosed and treated in May of 2021.  Patient's medical history has reportedly not changed since most recent medical evaluation.       Pure Tone Testing:    Right ear:       Normal to moderate, SNHL    Left ear:          Normal to moderate SNHL        Tympanometry:      Right ear:   Type 'A' tympanogram    Left ear: Type 'A' tympanogram           Acoustic Reflex Testing    Right ear:   Did not test     Left ear: Did not test        Interpretations:    Pure tone testing revealed normal to moderate, sensorineural hearing loss, bilaterally. Speech reception thresholds were obtained at 15 dB HL consistent with pure tone results, bilaterally.  Word recognition scores were excellent, bilaterally.  Today's findings consistent with previous testing in May of 2021, therefore indicative of stable hearing.  Immittance testing revealed Type A tympanograms, bilaterally, indicative of normal middle ear function. Otoscopy revealed clear EACs, bilaterally.      Recommendations:     Audiological testing annually  ENT evaluation per ENT  Hearing protection when exposed to hazardous noise    Lucy Sifuentes.  Clinical Audiologist       Jessie Hallpike Maneuver    DHP maneuver was performed today due to suspicions of recurring right posterior canal  BPPV.  Upon first maneuver it was evident of questionable nystagmus (similar to pattern noted in 2021) noted on head right, therefore a CRM (Epley) was performed.  Patient endorsed significant vertigo upon the final step (nose down) of the CRM.  A second DHP was  performed to verify result with no evidence of nystagmus noted and therefore indicative of a clear canal.  Patient was advised to adhere to post-maneuver guidelines given and call our dept should recurrence occur.

## 2022-11-10 ENCOUNTER — HOSPITAL ENCOUNTER (OUTPATIENT)
Dept: RADIOLOGY | Facility: HOSPITAL | Age: 77
Discharge: HOME | End: 2022-11-10
Attending: NURSE PRACTITIONER
Payer: MEDICARE

## 2022-11-10 DIAGNOSIS — Z12.31 VISIT FOR SCREENING MAMMOGRAM: ICD-10-CM

## 2022-11-10 PROCEDURE — 77063 BREAST TOMOSYNTHESIS BI: CPT | Mod: 26,,, | Performed by: RADIOLOGY

## 2022-11-10 PROCEDURE — 77063 BREAST TOMOSYNTHESIS BI: CPT | Mod: TC

## 2022-11-10 PROCEDURE — 77067 SCR MAMMO BI INCL CAD: CPT | Mod: 26,,, | Performed by: RADIOLOGY

## 2022-11-10 PROCEDURE — 77067 SCR MAMMO BI INCL CAD: CPT | Mod: TC

## 2022-11-10 PROCEDURE — 77063 MAMMO DIGITAL SCREENING BILAT WITH TOMO: ICD-10-PCS | Mod: 26,,, | Performed by: RADIOLOGY

## 2022-11-10 PROCEDURE — 77067 MAMMO DIGITAL SCREENING BILAT WITH TOMO: ICD-10-PCS | Mod: 26,,, | Performed by: RADIOLOGY

## 2022-11-14 NOTE — PROGRESS NOTES
[facility] Internal Medicine Clinic Visit    Chief Complaint:      Follow-up     Subjective:     HPI:  Mikala Thompson is a 77 y.o. female who has a past medical history of Amblyopia, Cataract, Glaucoma, Hyperlipidemia, Hypertension, Osteoarthritis, and Strabismus.  She presents to clinic today for follow-up of chronic medical conditions. No new complaints today. She follows with endocrine for hypothyroidism,osteoporosis, primary hyperparathyroidism. For hyperparathyroid she has declined surgical intervention, elected for medical management. Prolia q6months for osteoporosis. Denies any chest pain, abd pain, N/V/D, constipation, SOB, fevers, paresthesias.       Past Medical History:   Diagnosis Date    Amblyopia     Cataract     Glaucoma     Hyperlipidemia     Hypertension     Osteoarthritis     Strabismus        Past Surgical History:   Procedure Laterality Date    CATARACT EXTRACTION         Social History     Socioeconomic History    Marital status: Single   Tobacco Use    Smoking status: Never    Smokeless tobacco: Never   Substance and Sexual Activity    Alcohol use: Never    Drug use: Never    Sexual activity: Not Currently     Partners: Male     Birth control/protection: Post-menopausal         Current Outpatient Medications   Medication Instructions    amLODIPine (NORVASC) 5 MG tablet   See Instructions, Take 1 tablet by mouth once daily, # 90 tab(s), 7 Refill(s), Pharmacy: [id number], 150, cm, Height/Length Dosing, 02/01/22 10:09:00 CST, 64.75, kg, Weight Dosing, 02/01/22 10:09:00 CST    levothyroxine (SYNTHROID) 137 MCG Tab tablet Take 1/2 (one-half) tablet by mouth once daily    losartan (COZAAR) 50 MG tablet No dose, route, or frequency recorded.    pravastatin (PRAVACHOL) 40 MG tablet   See Instructions, Take 1 tablet by mouth once daily, # 90 tab(s), 5 Refill(s), Pharmacy: [id number], 150, cm, Height/Length Dosing, 02/01/22 10:09:00 CST, 64.75, kg, Weight Dosing, 02/01/22 10:09:00 CST  "      Review of Systems  Review of Systems   Constitutional:  Negative for chills and fever.   HENT:  Negative for sore throat.    Eyes:  Negative for photophobia and pain.   Respiratory:  Negative for cough and shortness of breath.    Cardiovascular:  Negative for chest pain and palpitations.   Gastrointestinal:  Negative for abdominal pain, blood in stool, constipation, diarrhea, nausea and vomiting.   Genitourinary:  Negative for flank pain and hematuria.   Musculoskeletal:  Positive for joint pain (chronic). Negative for falls and myalgias.   Skin:  Negative for itching and rash.   Neurological:  Positive for dizziness (related to hx of BPPV). Negative for tingling, tremors, sensory change, speech change, focal weakness, seizures, loss of consciousness, weakness and headaches.   Endo/Heme/Allergies:  Does not bruise/bleed easily.       Objective:   Last 24 Hour Vital Signs:  Vitals  BP: 117/78  Temp: 98.1 °F (36.7 °C)  Pulse: 75  Resp: 20  SpO2: 98 %  Height: 4' 11" (149.9 cm)  Weight: 64.5 kg (142 lb 3.2 oz)    Physical Examination:  Vital signs and nursing notes reviewed.  Constitutional: Patient is in NAD. Awake and alert.    Head: Atraumatic. Normocephalic.  Eyes: Conjunctivae nl. No scleral icterus.Strabismus to left eye.   ENT: Mucous membranes are moist. Oropharynx is clear.  Neck: Supple. Full ROM. No lymphadenopathy.  Cardiovascular: Regular rate and rhythm. No murmurs, rubs, or gallops. Distal pulses are 2+ and symmetric .  Pulmonary/Chest: No respiratory distress. Clear to auscultation bilaterally. No wheezing, rales, or rhonchi.  Abdominal: Soft. Non-distended. No TTP. No rebound, guarding, or rigidity.   Musculoskeletal: Moves all extremities. No edema.    Skin: Warm and dry.  Neurological: Awake and alert. No acute focal neurological deficits are appreciated.        Assessment & Plan:       Hypertension  -well controlled.  -Continue amlodipine 5, losartan 50     Hyperlipidemia  -Cholesterol 210, "  on 2/2020. Repeat ordered before net visit  -Advised patient to avoid fatty, greasy and fried foods and to incorporate more fruits and vegetables in her diet  -Continue pravastatin 40  The 10-year ASCVD risk score (Evan LAGUERRE, et al., 2019) is: 15.3%        Hypothyroidism  - TSH wnl on 7/27/2022 however TPO and thyroglobulin +ve - suggestive of Hashimotos  - On Synthroid 68.5mcg daily  - No new symptoms     primary hyperparathyroidism  -Following up with endocrinology, she deferred surgical management at this time   -Continue monitoring Ca levels     Bilateral wrist osteoarthritis/knee osteoarthritis  - Advised against the use of NSAIDs given her slightly depressed renal function  - Follows with sports medicine clinic for steroid injections which provides great relief for pt.   - Patient currently not on any NSAIDs     Osteoporosis given fragility fracture  -DEXA scan on 11/2020 showed osteopenia in the femoral necks  -T score of -2.2 of left femoral neck and -2.4 on right femoral neck  -Alendronate DCed on 7/2021 given GI adverse effects an started on Prolia with q6mts (due in feb 2023), monitor Ca levels  - DEXA repeat ordered.     Left distal radius fracture s/p ORIF -recovered  -Had a left distal radius fx on 6/30/2020 and underwent ORIF on 7/21/2020  -Continue f/u with Ortho     Glaucoma  Blepharitis/keratoconjunctivitis   Strabismus.   -Continue f/u with Ophthalmology      10) Traumatic chronic rotatory cuff tear  -MRI upper extremity right side showed subacute appearing and mildly impacted fracture of the humeral neck with extension to the posterolateral humeral head, no gross fragment displacement identified.  Suspected chronic partial-thickness tear of the supraspinatus tendon  -continue following up with ortho clinic     Stage 2 CKD  -eGFR on 7/2021 was 72, stable  - 3/22 was 70 - stable.  - eGFR > 60 today   Continue renal sparing activities:  -Follow low sodium diet (2 grams a day)  -Control high  blood pressure ( goal BP < 130/80, please record BP at home every day and bring log to next office visit)  -Exercise at least 30 minutes a day, 5 days a week.  -Maintain healthy weight.  -Decrease or stop alcohol use  -Do not smoke  -Stay well hydrated  -Receive Pneumovax, Flu, and HBV vaccines as indicated.  -Do not take NSAIDs (Ibuprofen, Naproxen, Aleve, Advil, Toradol, Mobic), may take only Tylenol as needed for pain/headaches.  -Take cholesterol-lowering medications as prescribed (LDL goal <100)      BBPV  -SNHL  -Good resolution of symptoms after Epley maneuver  -Continue follow-up with ENT/audiology appt is later this month.           Health Maintenance  Colon cancer screening: Normal in 2013. Cologuard ordered.   Lung Cancer screen: not indcted.  Mammogram: continue annual screening. Normal October 2022.  PapSmear: has discussed with GYN, no longer required.   Hep C: non-reactive.  Dexa: ordered  Vaccines      Pneumonia: defer      Annual Flu: UTD      Zoster: declines.      Tdap: 7/25/18        Follow-ups  -Follow-up medicine clinic in 4 months-   - ENT 11/22/22. For BBPV Annual audio  - Optho 11/18/22  - endo feb 2023  - GYN oct 2023      Nahid Lazcano DO  [facility] Internal [facility], [facility]

## 2022-11-15 ENCOUNTER — OFFICE VISIT (OUTPATIENT)
Dept: INTERNAL MEDICINE | Facility: CLINIC | Age: 77
End: 2022-11-15
Payer: MEDICARE

## 2022-11-15 VITALS
OXYGEN SATURATION: 98 % | HEART RATE: 75 BPM | DIASTOLIC BLOOD PRESSURE: 78 MMHG | WEIGHT: 142.19 LBS | HEIGHT: 59 IN | TEMPERATURE: 98 F | RESPIRATION RATE: 20 BRPM | SYSTOLIC BLOOD PRESSURE: 117 MMHG | BODY MASS INDEX: 28.67 KG/M2

## 2022-11-15 DIAGNOSIS — M81.0 OSTEOPOROSIS, UNSPECIFIED OSTEOPOROSIS TYPE, UNSPECIFIED PATHOLOGICAL FRACTURE PRESENCE: ICD-10-CM

## 2022-11-15 DIAGNOSIS — E21.0 PRIMARY HYPERPARATHYROIDISM: ICD-10-CM

## 2022-11-15 DIAGNOSIS — H01.006 BLEPHARITIS OF BOTH EYES, UNSPECIFIED EYELID, UNSPECIFIED TYPE: ICD-10-CM

## 2022-11-15 DIAGNOSIS — Z00.00 HEALTHCARE MAINTENANCE: Primary | ICD-10-CM

## 2022-11-15 DIAGNOSIS — H40.019 OPEN ANGLE GLAUCOMA SUSPECT WITH BORDERLINE FINDINGS AT LOW RISK: ICD-10-CM

## 2022-11-15 DIAGNOSIS — E78.5 HYPERLIPIDEMIA, UNSPECIFIED HYPERLIPIDEMIA TYPE: ICD-10-CM

## 2022-11-15 DIAGNOSIS — E06.3 HASHIMOTO'S THYROIDITIS: ICD-10-CM

## 2022-11-15 DIAGNOSIS — E55.9 VITAMIN D DEFICIENCY: ICD-10-CM

## 2022-11-15 DIAGNOSIS — H01.003 BLEPHARITIS OF BOTH EYES, UNSPECIFIED EYELID, UNSPECIFIED TYPE: ICD-10-CM

## 2022-11-15 PROCEDURE — 99214 OFFICE O/P EST MOD 30 MIN: CPT | Mod: PBBFAC

## 2022-11-18 ENCOUNTER — CLINICAL SUPPORT (OUTPATIENT)
Dept: OPHTHALMOLOGY | Facility: CLINIC | Age: 77
End: 2022-11-18
Payer: MEDICARE

## 2022-11-18 VITALS — BODY MASS INDEX: 28.63 KG/M2 | WEIGHT: 142 LBS | HEIGHT: 59 IN

## 2022-11-18 DIAGNOSIS — H04.123 DRY EYE SYNDROME OF BOTH EYES: ICD-10-CM

## 2022-11-18 DIAGNOSIS — H01.006 BLEPHARITIS OF BOTH EYES, UNSPECIFIED EYELID, UNSPECIFIED TYPE: Primary | ICD-10-CM

## 2022-11-18 DIAGNOSIS — H50.00 ESOTROPIA: ICD-10-CM

## 2022-11-18 DIAGNOSIS — H40.013 AT LOW RISK FOR OPEN-ANGLE GLAUCOMA IN BOTH EYES: ICD-10-CM

## 2022-11-18 DIAGNOSIS — H01.003 BLEPHARITIS OF BOTH EYES, UNSPECIFIED EYELID, UNSPECIFIED TYPE: Primary | ICD-10-CM

## 2022-11-18 PROCEDURE — 92083 EXTENDED VISUAL FIELD XM: CPT | Mod: PBBFAC,PO | Performed by: OPHTHALMOLOGY

## 2022-11-18 PROCEDURE — 99212 OFFICE O/P EST SF 10 MIN: CPT | Mod: PBBFAC,PO

## 2022-11-18 PROCEDURE — 92083 EXTENDED VISUAL FIELD XM: CPT | Mod: PBBFAC,PO

## 2022-11-18 NOTE — PROGRESS NOTES
Ancillary Testing    HVF 24-2  - 7/19/22  OD: 2/10 FL 0% FP 0% FN, nonspecific scattered deficits  OS: 1/10 FL 4% FP 0% FN, full field  - 11/18/22  OD: FL 0/11, FP 5%, FN 0%, few central defects  OS: FL 9/11, FP 0%, FN 7%, enlarged blind spot/dense defect temporally    Assessment /Plan     For exam results, see Encounter Report.    Blepharitis of both eyes, unspecified eyelid, unspecified type    Dry eye syndrome of both eyes    Esotropia       1. Myopia OU with posterior staphyloma OS, esotropia alternating  - Patient with long standing myopia. Does have hx of thyroid disease but not c/w restrictive disease.   - On further questioning she reports her left eye has always been weaker than her right eye. Esotropia started after cataract surgery OS.   - ~25PD ET in primary gaze stable. Alternating  - Patient saw Dr. Woodard 3/10/22 at [facility] for evaluation, had MRI orbits performed before surgical planning; per read, patient has oblong configuration of globes (unable to view images) -- this may be representative of her myopia and staphyloma  - 11/18/22: Planned for surgery multiple times but cancelled for various reasons. Discussed with patient today that she also has visually significant cataracts in the right eye, may benefit from cataract surgery first then see if/how it affects strabismus as we had previously operated on her non-dominant eye. Patient agrees with plan but cannot be dilated today.     2. Combined form of age-related cataract, OD  - BCVA 20/40 11/18/22  - RTC for DFE and possible cataract eval in 1-2 months    3. Open angle with borderline glaucoma findings, low risk, both eyes  - Based on CDR asymmetry  - CCT thin OS>OD  - Gonio open to CBB  - OCT RNFL 1/25/22: IN thinning OD, full OS  - HVF   7/2022 essentially full with nonspecific deficits OD (does not correlate with OCT thinning)  11/2022: full with nonspecific central defects right eye // left eye unreliable due to high FL  - Given IOP mid-low  teens and tilted disc likely obscuring OCT findings, continue to monitor off drops    4. Blepharitis and Keratoconjunctivitis sicca OU  - With complaint of burning most hours of the day  - Did not notice improvement with lotemax, restasis, xiidra, BCL, Avenova, punctal plugs  - Has punctal plug in place OS, absent OD  - Lab work: SSA, SSB, YAW, RF, ACE/lysozyme, scleroderma, Anti-smith, CRP/ESR all within normal limits  - Current regimen: PFATs q3 hours, WC & LS 1-2 times/day    5. Bilateral lower eyelid laxity, trace involutional ectropion  - May be contributing to eye surface symptoms  - Will treat surface as above & defer any surgery until after possible cataract surgery and Dr. Woodard strabismus eval    6. Pseudophakia OS (10/8/20)  - BCVA 20/25 11/18/22    RTC 1-2 months DFE and cataract eval OD

## 2022-11-22 ENCOUNTER — OFFICE VISIT (OUTPATIENT)
Dept: OTOLARYNGOLOGY | Facility: CLINIC | Age: 77
End: 2022-11-22
Payer: MEDICARE

## 2022-11-22 VITALS
WEIGHT: 143.38 LBS | BODY MASS INDEX: 28.91 KG/M2 | HEIGHT: 59 IN | SYSTOLIC BLOOD PRESSURE: 126 MMHG | DIASTOLIC BLOOD PRESSURE: 76 MMHG | HEART RATE: 70 BPM | TEMPERATURE: 98 F

## 2022-11-22 DIAGNOSIS — H81.11 BPPV (BENIGN PAROXYSMAL POSITIONAL VERTIGO), RIGHT: ICD-10-CM

## 2022-11-22 DIAGNOSIS — H90.3 SENSORINEURAL HEARING LOSS (SNHL) OF BOTH EARS: Primary | ICD-10-CM

## 2022-11-22 PROCEDURE — 3074F SYST BP LT 130 MM HG: CPT | Mod: CPTII,,, | Performed by: NURSE PRACTITIONER

## 2022-11-22 PROCEDURE — 3288F FALL RISK ASSESSMENT DOCD: CPT | Mod: CPTII,,, | Performed by: NURSE PRACTITIONER

## 2022-11-22 PROCEDURE — 1126F PR PAIN SEVERITY QUANTIFIED, NO PAIN PRESENT: ICD-10-PCS | Mod: CPTII,,, | Performed by: NURSE PRACTITIONER

## 2022-11-22 PROCEDURE — 3078F PR MOST RECENT DIASTOLIC BLOOD PRESSURE < 80 MM HG: ICD-10-PCS | Mod: CPTII,,, | Performed by: NURSE PRACTITIONER

## 2022-11-22 PROCEDURE — 99213 OFFICE O/P EST LOW 20 MIN: CPT | Mod: S$PBB,,, | Performed by: NURSE PRACTITIONER

## 2022-11-22 PROCEDURE — 3078F DIAST BP <80 MM HG: CPT | Mod: CPTII,,, | Performed by: NURSE PRACTITIONER

## 2022-11-22 PROCEDURE — 1101F PR PT FALLS ASSESS DOC 0-1 FALLS W/OUT INJ PAST YR: ICD-10-PCS | Mod: CPTII,,, | Performed by: NURSE PRACTITIONER

## 2022-11-22 PROCEDURE — 1159F MED LIST DOCD IN RCRD: CPT | Mod: CPTII,,, | Performed by: NURSE PRACTITIONER

## 2022-11-22 PROCEDURE — 3074F PR MOST RECENT SYSTOLIC BLOOD PRESSURE < 130 MM HG: ICD-10-PCS | Mod: CPTII,,, | Performed by: NURSE PRACTITIONER

## 2022-11-22 PROCEDURE — 3288F PR FALLS RISK ASSESSMENT DOCUMENTED: ICD-10-PCS | Mod: CPTII,,, | Performed by: NURSE PRACTITIONER

## 2022-11-22 PROCEDURE — 1126F AMNT PAIN NOTED NONE PRSNT: CPT | Mod: CPTII,,, | Performed by: NURSE PRACTITIONER

## 2022-11-22 PROCEDURE — 1101F PT FALLS ASSESS-DOCD LE1/YR: CPT | Mod: CPTII,,, | Performed by: NURSE PRACTITIONER

## 2022-11-22 PROCEDURE — 99213 PR OFFICE/OUTPT VISIT, EST, LEVL III, 20-29 MIN: ICD-10-PCS | Mod: S$PBB,,, | Performed by: NURSE PRACTITIONER

## 2022-11-22 PROCEDURE — 1159F PR MEDICATION LIST DOCUMENTED IN MEDICAL RECORD: ICD-10-PCS | Mod: CPTII,,, | Performed by: NURSE PRACTITIONER

## 2022-11-22 PROCEDURE — 99213 OFFICE O/P EST LOW 20 MIN: CPT | Mod: PBBFAC | Performed by: NURSE PRACTITIONER

## 2022-11-22 NOTE — PROGRESS NOTES
[facility]  Otolaryngology Clinic Note    Mikala Thompson  Encounter Date: 10/11/2022  YOB: 1945    Chief Complaint: dizziness    HPI: 4/12/21: 75yoF referred for dizziness. Is having dizzy episodes ~1-2x/day which began 1/2021. Describes as feeling is as if balance is off and lightheaded; is unsure what exacerbates this. Also has occasional brief room spinning when she turns over in the bed. Episodes last for a few seconds. Reports having normal orthostatic VS at last PCP visit. Denies appreciable HL or tinnitus. Denies medication change. Endorses falling on either side of her body in June of 2020 and January of 2021.    5/26/21: Reports dizziness has been greatly improved since Epley and with home maneuvers. Requesting to have her ears cleaned. She uses qtips to clean them at home.    6/17/21: Denies otorrhea or otalgia. Continues to do well from a dizziness standpoint.    10/11/2022: Reports she began having vertiginous dizziness again 4-6 weeks ago which is similar to her previous BPPV. She has not had any falls or medication changes. She is wondering if it is because she has ear wax buildup.     11/22/22: States that dizziness had improved following Epley, however, she had a bad episode again last week. She reports resolution following home Epley demonstrated by audiology, but she is very concerned and anxious about it coming back.    ROS:   10-point review of systems negative except per HPI      Review of patient's allergies indicates:  No Known Allergies    Past Medical History:   Diagnosis Date    Amblyopia     Cataract     Glaucoma     Hyperlipidemia     Hypertension     Osteoarthritis     Strabismus        Past Surgical History:   Procedure Laterality Date    CATARACT EXTRACTION         Social History     Socioeconomic History    Marital status: Single   Tobacco Use    Smoking status: Never    Smokeless tobacco: Never   Substance and Sexual Activity    Alcohol use:  "Never    Drug use: Never    Sexual activity: Not Currently     Partners: Male     Birth control/protection: Post-menopausal       Family History   Problem Relation Age of Onset    Cancer Maternal Uncle     Diabetes Maternal Grandmother        Outpatient Encounter Medications as of 10/11/2022   Medication Sig Dispense Refill    amLODIPine (NORVASC) 5 MG tablet Take 5 mg by mouth once daily.      amLODIPine (NORVASC) 5 MG tablet   See Instructions, Take 1 tablet by mouth once daily, # 90 tab(s), 7 Refill(s), Pharmacy: [id number], 150, cm, Height/Length Dosing, 02/01/22 10:09:00 CST, 64.75, kg, Weight Dosing, 02/01/22 10:09:00 CST      cycloSPORINE (RESTASIS) 0.05 % ophthalmic emulsion Place 1 drop into both eyes 2 (two) times daily. 30 each 2    diclofenac (VOLTAREN) 75 MG EC tablet       levothyroxine (SYNTHROID) 137 MCG Tab tablet Take 1/2 (one-half) tablet by mouth once daily 45 tablet 2    losartan (COZAAR) 50 MG tablet       pravastatin (PRAVACHOL) 40 MG tablet   See Instructions, Take 1 tablet by mouth once daily, # 90 tab(s), 5 Refill(s), Pharmacy: [id number], 150, cm, Height/Length Dosing, 02/01/22 10:09:00 CST, 64.75, kg, Weight Dosing, 02/01/22 10:09:00 CST       No facility-administered encounter medications on file as of 10/11/2022.       Physical Exam:  Vitals:    10/11/22 0858   BP: 113/77   BP Location: Right arm   Patient Position: Sitting   BP Method: Medium (Automatic)   Pulse: 80   Temp: 97.8 °F (36.6 °C)   TempSrc: Oral   Weight: 64.1 kg (141 lb 6.4 oz)   Height: 4' 11" (1.499 m)     General: NAD, voice normal  Neuro: AAO, CN II - XII grossly intact  Head/ Face: NCAT, symmetric, sensations intact bilaterally  Eyes: EOMI, PERRL  Ears: externally normal with grossly normal hearing  AD: EAC with some dry, flaky cerumen- removed using alligator forceps under microscopy to reveal TM intact w/o effusion or retraction  AS: EAC patent. TM intact w/o effusion or retraction  Nose: " bilateral nares patent, midline septum, no rhinorrhea, no external deformity, no turbinate hypertrophy  OC/OP: MMM, no intraoral lesions, no trismus, dentition is poor, no uvular deviation, bilaterally symmetric soft palate elevation, palatoglossus and palatopharyngeal fold wnl; tonsils are symmetric and 1+  Indirect laryngoscopy: deferred due to patient intolerance  Neck: soft, supple, no LAD, normal ROM, no thyromegaly  Respiratory: nonlabored, no wheezing, bilateral chest rise  Cardiovascular: RRR  Gastrointestinal: S NT ND  Skin: warm, no lesions  Musculoskeletal: 5/5 strength  Psych: Appropriate affect/mood     Audio:         Assessment/Plan:  77 y.o. female with hx of BPPV of right ear in 2021 which resolved following Epley, now with return of acute symptoms. Audio with moderate SNHL of high frequencies b/l; stable- reviewed. Offered referral for assessment/repeat CRM through audiology. Pt would prefer to wait and see what happens for now. She will call if symptoms return that she is unable to self treat.   - Ok to take dramamine or meclizine prn, however, discussed that this is not a definitive tx  - Safety precautions  - Annual audio  - RTC 6-12mo       Annalisa Mack NP

## 2022-11-28 NOTE — PROGRESS NOTES
Attending Addendum:   Patient seen and examined in clinic. Management and Plan were discussed with resident. Care was reasonable and necessary.   Lili Hinton MD  [facility] - Internal Medicine

## 2022-12-12 ENCOUNTER — HOSPITAL ENCOUNTER (OUTPATIENT)
Dept: RADIOLOGY | Facility: HOSPITAL | Age: 77
Discharge: HOME | End: 2022-12-12
Payer: MEDICARE

## 2022-12-12 DIAGNOSIS — E21.0 PRIMARY HYPERPARATHYROIDISM: ICD-10-CM

## 2022-12-12 DIAGNOSIS — M81.0 OSTEOPOROSIS, UNSPECIFIED OSTEOPOROSIS TYPE, UNSPECIFIED PATHOLOGICAL FRACTURE PRESENCE: ICD-10-CM

## 2022-12-12 DIAGNOSIS — Z00.00 HEALTHCARE MAINTENANCE: ICD-10-CM

## 2022-12-12 DIAGNOSIS — E55.9 VITAMIN D DEFICIENCY: ICD-10-CM

## 2022-12-12 PROCEDURE — 77080 DXA BONE DENSITY AXIAL: CPT | Mod: TC

## 2023-01-03 ENCOUNTER — OFFICE VISIT (OUTPATIENT)
Dept: OPHTHALMOLOGY | Facility: CLINIC | Age: 78
End: 2023-01-03
Payer: MEDICARE

## 2023-01-03 VITALS — BODY MASS INDEX: 28.89 KG/M2 | HEIGHT: 59 IN | WEIGHT: 143.31 LBS

## 2023-01-03 DIAGNOSIS — H04.123 DRY EYE SYNDROME OF BOTH EYES: ICD-10-CM

## 2023-01-03 DIAGNOSIS — H40.019 OPEN ANGLE GLAUCOMA SUSPECT WITH BORDERLINE FINDINGS AT LOW RISK: Primary | ICD-10-CM

## 2023-01-03 PROCEDURE — 99213 OFFICE O/P EST LOW 20 MIN: CPT | Mod: PBBFAC,PO | Performed by: STUDENT IN AN ORGANIZED HEALTH CARE EDUCATION/TRAINING PROGRAM

## 2023-01-03 NOTE — PROGRESS NOTES
HPI     Cataract     Additional comments: DFE and cataract eval OD           Dry Eye     Additional comments: AFT OU everyday           Comments    cataract eval OD and DFE          Last edited by Abdelrahman Sifuentes MA on 1/3/2023 10:11 AM.      Assessment /Plan     For exam results, see Encounter Report.    There are no diagnoses linked to this encounter.    Ancillary Testing    HVF 24-2  - 7/19/22  OD: 2/10 FL 0% FP 0% FN, nonspecific scattered deficits  OS: 1/10 FL 4% FP 0% FN, full field  - 11/18/22  OD: FL 0/11, FP 5%, FN 0%, few central defects  OS: FL 9/11, FP 0%, FN 7%, enlarged blind spot/dense defect temporally    Assessment /Plan     For exam results, see Encounter Report.    There are no diagnoses linked to this encounter.       1. Myopia OU with posterior staphyloma OS, esotropia alternating  - Patient with long standing myopia. Does have hx of thyroid disease but not c/w restrictive disease.   - On further questioning she reports her left eye has always been weaker than her right eye. Esotropia started after cataract surgery OS.   - ~25PD ET in primary gaze stable. Alternating  - Patient saw Dr. Woodard 3/10/22 at [facility] for evaluation, had MRI orbits performed before surgical planning; per read, patient has oblong configuration of globes (unable to view images) -- this may be representative of her myopia and staphyloma  - 11/18/22: Planned for surgery multiple times but cancelled for various reasons. Discussed with patient today that she also has visually significant cataracts in the right eye, may benefit from cataract surgery first then see if/how it affects strabismus as we had previously operated on her non-dominant eye. Patient agrees with plan but cannot be dilated today.     2. Combined form of age-related cataract, OD  - BCVA 20/40 11/18/22  - 1/3/22:   Patient here for cat eval.  Vision last visit was worse than 20 40.  Patient now with 20 30 vision.  Likely cause of improvement due to  fluctuating severe dry eye.  Discussed starting GenTeal gel.    3. Open angle with borderline glaucoma findings, low risk, both eyes  - Based on CDR asymmetry  - CCT thin OS>OD  - Gonio open to CBB  - OCT RNFL 1/25/22: IN thinning OD, full OS  - HVF   7/2022 essentially full with nonspecific deficits OD (does not correlate with OCT thinning)  11/2022: full with nonspecific central defects right eye // left eye unreliable due to high FL  - Given IOP mid-low teens and tilted disc likely obscuring OCT findings, continue to monitor off drops    4. Blepharitis and Keratoconjunctivitis sicca OU  - With complaint of burning most hours of the day  - Did not notice improvement with lotemax, restasis, xiidra, BCL, Avenova, punctal plugs  - Has punctal plug in place OS, absent OD  - Lab work: SSA, SSB, YAW, RF, ACE/lysozyme, scleroderma, Anti-smith, CRP/ESR all within normal limits  - Current regimen: PFATs q3 hours, WC & LS 1-2 times/day  - 1/3/22 start GenTeal gel is discussed in 2.    5. Bilateral lower eyelid laxity, trace involutional ectropion  - May be contributing to eye surface symptoms  - Will treat surface as above & defer any surgery until after possible cataract surgery and Dr. Woodard strabismus eval    6. Pseudophakia OS (10/8/20)  - BCVA 20/25 11/18/22    RTC 4 months OCT RNFL

## 2023-01-17 RX ORDER — LOSARTAN POTASSIUM 50 MG/1
50 TABLET ORAL DAILY
Qty: 90 TABLET | Refills: 2 | Status: SHIPPED | OUTPATIENT
Start: 2023-01-17 | End: 2023-11-13 | Stop reason: SDUPTHER

## 2023-01-18 ENCOUNTER — TELEPHONE (OUTPATIENT)
Dept: ENDOCRINOLOGY | Facility: CLINIC | Age: 78
End: 2023-01-18
Payer: MEDICARE

## 2023-01-18 DIAGNOSIS — M81.0 OSTEOPOROSIS, UNSPECIFIED OSTEOPOROSIS TYPE, UNSPECIFIED PATHOLOGICAL FRACTURE PRESENCE: ICD-10-CM

## 2023-01-18 DIAGNOSIS — E06.3 HASHIMOTO'S THYROIDITIS: ICD-10-CM

## 2023-01-18 DIAGNOSIS — E55.9 VITAMIN D DEFICIENCY: Primary | ICD-10-CM

## 2023-01-18 NOTE — TELEPHONE ENCOUNTER
She is on the Feb 17th schedule to be seen and receive prolia injection.  Would it be ok to change to a nurse visit give prolia, draw labs, and push appointment back 6 month if labs are acceptable?

## 2023-01-18 NOTE — TELEPHONE ENCOUNTER
Ok to convert her 2/23 appt to a nurse visit for prolia.  Reschedule to 6 months from then with renal panel, vit d, TSH prior.  No need to get labs 2/23 unless the clinical situation changes.

## 2023-01-19 NOTE — TELEPHONE ENCOUNTER
Please cancel 02/17/2023 appointment and schedule a nurse visit for that day at 8.00 am.   R/s Office visit for August. Thank you.

## 2023-02-16 ENCOUNTER — TELEPHONE (OUTPATIENT)
Dept: ENDOCRINOLOGY | Facility: CLINIC | Age: 78
End: 2023-02-16
Payer: MEDICARE

## 2023-02-16 DIAGNOSIS — M81.0 OSTEOPOROSIS, UNSPECIFIED OSTEOPOROSIS TYPE, UNSPECIFIED PATHOLOGICAL FRACTURE PRESENCE: Primary | ICD-10-CM

## 2023-02-17 ENCOUNTER — CLINICAL SUPPORT (OUTPATIENT)
Dept: ENDOCRINOLOGY | Facility: CLINIC | Age: 78
End: 2023-02-17
Payer: MEDICARE

## 2023-02-17 DIAGNOSIS — M81.0 OSTEOPOROSIS, UNSPECIFIED OSTEOPOROSIS TYPE, UNSPECIFIED PATHOLOGICAL FRACTURE PRESENCE: Primary | ICD-10-CM

## 2023-02-17 PROCEDURE — 96372 THER/PROPH/DIAG INJ SC/IM: CPT | Mod: PBBFAC

## 2023-02-17 RX ADMIN — DENOSUMAB 60 MG: 60 INJECTION SUBCUTANEOUS at 08:02

## 2023-03-09 ENCOUNTER — OFFICE VISIT (OUTPATIENT)
Dept: INTERNAL MEDICINE | Facility: CLINIC | Age: 78
End: 2023-03-09
Payer: MEDICARE

## 2023-03-09 VITALS
HEART RATE: 64 BPM | OXYGEN SATURATION: 98 % | HEIGHT: 59 IN | BODY MASS INDEX: 29.07 KG/M2 | TEMPERATURE: 98 F | DIASTOLIC BLOOD PRESSURE: 70 MMHG | WEIGHT: 144.19 LBS | SYSTOLIC BLOOD PRESSURE: 136 MMHG | RESPIRATION RATE: 20 BRPM

## 2023-03-09 DIAGNOSIS — E21.3 HYPERPARATHYROIDISM: ICD-10-CM

## 2023-03-09 DIAGNOSIS — M81.0 OSTEOPOROSIS, UNSPECIFIED OSTEOPOROSIS TYPE, UNSPECIFIED PATHOLOGICAL FRACTURE PRESENCE: ICD-10-CM

## 2023-03-09 DIAGNOSIS — E03.9 HYPOTHYROIDISM, UNSPECIFIED TYPE: ICD-10-CM

## 2023-03-09 DIAGNOSIS — M25.569 KNEE PAIN, UNSPECIFIED CHRONICITY, UNSPECIFIED LATERALITY: ICD-10-CM

## 2023-03-09 DIAGNOSIS — E21.0 PRIMARY HYPERPARATHYROIDISM: ICD-10-CM

## 2023-03-09 DIAGNOSIS — Z00.00 HEALTHCARE MAINTENANCE: Primary | ICD-10-CM

## 2023-03-09 PROCEDURE — 99214 OFFICE O/P EST MOD 30 MIN: CPT | Mod: PBBFAC

## 2023-03-09 NOTE — PROGRESS NOTES
[facility] Internal Medicine Clinic Visit    Chief Complaint:      Follow-up (Pt here today for 4 mth f/u visit. Still dealing with knee pain and insomnia. )     Subjective:     HPI:  Mikala Thompson is a 77 y.o. female who has a past medical history of Amblyopia, Benign paroxysmal vertigo, bilateral, Cataract, Glaucoma, Hyperlipidemia, Hypertension, Hypothyroidism, unspecified, Osteoarthritis, and Strabismus.  She presents to clinic today for follow-up of chronic medical conditions. No new complaints today although she is having knee pain today she follows PRN with sports medicine and last got CSI 9/22.  Sx worse in the cold weather. States she takes ibuprofen intermittently (3 times this past week) which does help some, voltaren gel, lidocaine patches with mixed amounts of relief. She follows with endocrine for hypothyroidism, osteoporosis, primary hyperparathyroidism. For hyperparathyroid she has declined surgical intervention, elected for medical management. Prolia q6months for osteoporosis. Denies any chest pain, abd pain, N/V/D, constipation, SOB, fevers, paresthesias.       Past Medical History:   Diagnosis Date    Amblyopia     Benign paroxysmal vertigo, bilateral     Cataract     Glaucoma     Hyperlipidemia     Hypertension     Hypothyroidism, unspecified     Osteoarthritis     Strabismus        Past Surgical History:   Procedure Laterality Date    CATARACT EXTRACTION      EYE SURGERY  10/2020    Cataracts    FRACTURE SURGERY Left 06/2020    Wrist    TUBAL LIGATION  02/1980       Social History     Socioeconomic History    Marital status: Single   Tobacco Use    Smoking status: Never    Smokeless tobacco: Never   Substance and Sexual Activity    Alcohol use: Never    Drug use: Never    Sexual activity: Not Currently     Partners: Male     Birth control/protection: Post-menopausal     Social Determinants of Health     Financial Resource Strain: Low Risk     Difficulty of Paying Living Expenses: Not hard at all    Food Insecurity: Food Insecurity Present    Worried About Running Out of Food in the Last Year: Sometimes true    Ran Out of Food in the Last Year: Sometimes true   Transportation Needs: No Transportation Needs    Lack of Transportation (Medical): No    Lack of Transportation (Non-Medical): No   Physical Activity: Sufficiently Active    Days of Exercise per Week: 3 days    Minutes of Exercise per Session: 60 min   Stress: No Stress Concern Present    Feeling of Stress : Only a little   Social Connections: Socially Isolated    Frequency of Communication with Friends and Family: More than three times a week    Frequency of Social Gatherings with Friends and Family: Three times a week    Attends Sabianist Services: Never    Active Member of Clubs or Organizations: No    Attends Club or Organization Meetings: Never    Marital Status:    Housing Stability: Unknown    Unable to Pay for Housing in the Last Year: No    Number of Places Lived in the Last Year: 1         Current Outpatient Medications   Medication Instructions    amLODIPine (NORVASC) 5 MG tablet   See Instructions, Take 1 tablet by mouth once daily, # 90 tab(s), 7 Refill(s), Pharmacy: [id number], 150, cm, Height/Length Dosing, 02/01/22 10:09:00 CST, 64.75, kg, Weight Dosing, 02/01/22 10:09:00 CST    cycloSPORINE 0.05 % Drop   See Instructions, 1 drop(s) q12hr both eyes, # 30 EA, 5 Refill(s), Pharmacy: [id number], 150, cm, Height/Length Dosing, 11/23/21 12:17:00 CST, 63, kg, Weight Dosing, 11/23/21 12:17:00 CST    levothyroxine (SYNTHROID) 137 MCG Tab tablet Take 1/2 (one-half) tablet by mouth once daily    losartan (COZAAR) 50 mg, Oral, Daily    pravastatin (PRAVACHOL) 40 MG tablet   See Instructions, Take 1 tablet by mouth once daily, # 90 tab(s), 5 Refill(s), Pharmacy: [facility], 150, cm, Height/Length Dosing, 02/01/22 10:09:00 CST, 64.75, kg, Weight Dosing, 02/01/22 10:09:00 CST       Review of Systems  Review of  "Systems   Constitutional:  Negative for chills and fever.   HENT:  Negative for sore throat.    Eyes:  Negative for photophobia and pain.   Respiratory:  Negative for cough and shortness of breath.    Cardiovascular:  Negative for chest pain and palpitations.   Gastrointestinal:  Negative for abdominal pain, blood in stool, constipation, diarrhea, nausea and vomiting.   Genitourinary:  Negative for flank pain and hematuria.   Musculoskeletal:  Positive for joint pain (chronic). Negative for falls and myalgias.   Skin:  Negative for itching and rash.   Neurological:  Negative for dizziness, tingling, tremors, sensory change, speech change, focal weakness, seizures, loss of consciousness, weakness and headaches.   Endo/Heme/Allergies:  Does not bruise/bleed easily.       Objective:   Last 24 Hour Vital Signs:  Vitals  BP: 136/70  Temp: 98 °F (36.7 °C)  Temp Source: Oral  Pulse: 64  Resp: 20  SpO2: 98 %  Height: 4' 11" (149.9 cm)  Weight: 65.4 kg (144 lb 3.2 oz)    136/70 on manual recheck.     Physical Examination:  Vital signs and nursing notes reviewed.  Constitutional: Patient is in NAD. Awake and alert.    Head: Atraumatic. Normocephalic.  Eyes: Conjunctivae nl. No scleral icterus. Strabismus to left eye.   ENT: Mucous membranes are moist. Oropharynx is clear.  Neck: Supple. Full ROM. No lymphadenopathy.  Cardiovascular: Regular rate and rhythm. No murmurs, rubs, or gallops. Distal pulses are 2+ and symmetric .  Pulmonary/Chest: No respiratory distress. Clear to auscultation bilaterally. No wheezing, rales, or rhonchi.  Abdominal: Soft. Non-distended. No TTP. No rebound, guarding, or rigidity.   Musculoskeletal: Moves all extremities. No edema.    Skin: Warm and dry.  Neurological: Awake and alert. No acute focal neurological deficits are appreciated.        Assessment & Plan:       Hypertension  -well controlled.  -Continue amlodipine 5, losartan 50     Hyperlipidemia  -Cholesterol 210,  on 11/15/22. "   -Advised patient to avoid fatty, greasy and fried foods and to incorporate more fruits and vegetables in her diet  -Continue pravastatin 40    Hypothyroidism  - TSH wnl on 7/27/2022 however TPO and thyroglobulin +ve - suggestive of Hashimotos  - On Synthroid 68.5mcg daily  - No new symptoms     primary hyperparathyroidism  -Following up with endocrinology, she deferred surgical management at this time   -Continue monitoring Ca levels     Bilateral wrist osteoarthritis/knee osteoarthritis  - Advised against the chronic use of NSAIDs given her slightly depressed renal function  - Follows with sports medicine clinic for steroid injections which provides great relief for pt. Last CSI 9/22, pt to f/u PRN. She will let me know if she needs another referral.   - Patient currently not on any NSAIDs     Osteoporosis given fragility fracture  -DEXA scan on 11/2020 showed osteopenia in the femoral necks  -T score of -2.2 of left femoral neck and -2.4 on right femoral neck  -Alendronate DCed on 7/2021 given GI adverse effects an started on Prolia with q6mts (due in feb 2023), monitor Ca levels  - DEXA 12/12/22 The bilateral total mean femoral bone mineral density is equal to 0.752 g/cm squared with a T score of -2.0. This is improved from prior in 2020.      Left distal radius fracture s/p ORIF -recovered  -Had a left distal radius fx on 6/30/2020 and underwent ORIF on 7/21/2020  -Continue f/u with Ortho     Glaucoma  Blepharitis/keratoconjunctivitis  Strabismus.   Cataracts  -Continue f/u with Ophthalmology     Traumatic chronic rotatory cuff tear  -MRI upper extremity right side showed subacute appearing and mildly impacted fracture of the humeral neck with extension to the posterolateral humeral head, no gross fragment displacement identified.  Suspected chronic partial-thickness tear of the supraspinatus tendon  -continue following up with ortho clinic.     Stage 2 CKD  -eGFR on 7/2021 was 72, stable  - 3/22 was 70 -  stable.  - eGFR > 60 today   Continue renal sparing activities:  -Follow low sodium diet (2 grams a day)  -Control high blood pressure ( goal BP < 130/80, please record BP at home every day and bring log to next office visit)  -Exercise at least 30 minutes a day, 5 days a week.  -Maintain healthy weight.  -Decrease or stop alcohol use  -Do not smoke  -Stay well hydrated  -Receive Pneumovax, Flu, and HBV vaccines as indicated.  -Do not take NSAIDs (Ibuprofen, Naproxen, Aleve, Advil, Toradol, Mobic), may take only Tylenol as needed for pain/headaches.  -Take cholesterol-lowering medications as prescribed (LDL goal <100)      BBPV  -SNHL  -Good resolution of symptoms after Epley maneuver  -Continue follow-up with ENT/audiology appt is later this month.  - Ok to take dramamine or meclizine prn, however, this is not a definitive tx       Health Maintenance  Colon cancer screening: Normal in 2013. Cologuard ordered.   Lung Cancer screen: not indicated.  Mammogram: continue annual screening. Normal October 2022.  PapSmear: has discussed with GYN, no longer required.   Hep C: non-reactive.  Dexa: UTD. see above note of osteoporosis  Vaccines      Pneumonia: UTD      Annual Flu: UTD      Zoster: declines.      Tdap: 7/25/18    Immunization History   Administered Date(s) Administered    COVID-19 Vaccine 10/25/2021, 04/11/2022    COVID-19, MRNA, LN-S, PF (MODERNA FULL 0.5 ML DOSE) 02/19/2021, 03/19/2021, 10/25/2021    Influenza 11/13/2017    Influenza - High Dose - PF (65 years and older) 11/04/2015, 11/13/2017, 11/12/2018, 11/05/2019    Influenza - Quadrivalent 12/14/2016    Influenza - Quadrivalent - High Dose - PF (65 years and older) 10/21/2020, 10/04/2021, 10/11/2022    Pneumococcal 11/04/2015    Pneumococcal Conjugate - 13 Valent 07/18/2019    Pneumococcal Polysaccharide - 23 Valent 11/04/2015    Tdap 07/25/2018, 07/25/2018           Follow-ups  -Follow-up medicine clinic in 4 months  - Optho 5/5/23  - ENT 5/23/23. For  BBPV, Annual audio  - endo 8/22/2023  - GYN 10/10/2023      Nahid Lazcano DO  [facility], HO[facility]

## 2023-03-14 NOTE — TELEPHONE ENCOUNTER
Spoke with patient informed labs were acceptable, will keep August follow up with repeat labs prior.

## 2023-03-14 NOTE — TELEPHONE ENCOUNTER
Pt did labs early instead of prior to f/u.  They were acceptable.  Ok to notify to keep f/u as planned w/ renal panel, vit d prior to regroup.

## 2023-03-29 DIAGNOSIS — Z00.00 HEALTHCARE MAINTENANCE: Primary | ICD-10-CM

## 2023-03-29 RX ORDER — PRAVASTATIN SODIUM 40 MG/1
TABLET ORAL
Qty: 90 TABLET | Refills: 1 | Status: SHIPPED | OUTPATIENT
Start: 2023-03-29 | End: 2023-11-13 | Stop reason: SDUPTHER

## 2023-03-29 RX ORDER — AMLODIPINE BESYLATE 5 MG/1
TABLET ORAL
Qty: 90 TABLET | Refills: 1 | Status: SHIPPED | OUTPATIENT
Start: 2023-03-29 | End: 2023-11-13 | Stop reason: SDUPTHER

## 2023-04-10 ENCOUNTER — OFFICE VISIT (OUTPATIENT)
Dept: ORTHOPEDICS | Facility: CLINIC | Age: 78
End: 2023-04-10
Payer: MEDICARE

## 2023-04-10 VITALS
HEART RATE: 74 BPM | SYSTOLIC BLOOD PRESSURE: 118 MMHG | HEIGHT: 59 IN | DIASTOLIC BLOOD PRESSURE: 76 MMHG | WEIGHT: 142.38 LBS | BODY MASS INDEX: 28.7 KG/M2

## 2023-04-10 DIAGNOSIS — M17.0 PRIMARY OSTEOARTHRITIS OF BOTH KNEES: Primary | ICD-10-CM

## 2023-04-10 PROCEDURE — 20610 DRAIN/INJ JOINT/BURSA W/O US: CPT | Mod: 50,PBBFAC | Performed by: STUDENT IN AN ORGANIZED HEALTH CARE EDUCATION/TRAINING PROGRAM

## 2023-04-10 PROCEDURE — 99213 OFFICE O/P EST LOW 20 MIN: CPT | Mod: PBBFAC,25

## 2023-04-10 RX ORDER — LIDOCAINE HYDROCHLORIDE 10 MG/ML
5 INJECTION, SOLUTION EPIDURAL; INFILTRATION; INTRACAUDAL; PERINEURAL
Status: COMPLETED | OUTPATIENT
Start: 2023-04-10 | End: 2023-04-10

## 2023-04-10 RX ORDER — TRIAMCINOLONE ACETONIDE 40 MG/ML
40 INJECTION, SUSPENSION INTRA-ARTICULAR; INTRAMUSCULAR ONCE
Status: COMPLETED | OUTPATIENT
Start: 2023-04-10 | End: 2023-04-10

## 2023-04-10 RX ORDER — TRIAMCINOLONE ACETONIDE 40 MG/ML
40 INJECTION, SUSPENSION INTRA-ARTICULAR; INTRAMUSCULAR
Status: COMPLETED | OUTPATIENT
Start: 2023-04-10 | End: 2023-04-10

## 2023-04-10 RX ADMIN — LIDOCAINE HYDROCHLORIDE 50 MG: 10 INJECTION, SOLUTION EPIDURAL; INFILTRATION; INTRACAUDAL; PERINEURAL at 09:04

## 2023-04-10 RX ADMIN — TRIAMCINOLONE ACETONIDE 40 MG: 40 INJECTION, SUSPENSION INTRA-ARTICULAR; INTRAMUSCULAR at 09:04

## 2023-04-10 NOTE — PROGRESS NOTES
"Subjective:    Patient ID: Mikala Thompson is a 77 y.o. female  who presented to [facility] for follow up..      Chief Complaint: Pain of the Right Knee and Pain of the Left Knee      History of Present Illness:    Mikala Thompson who has a history of Osteoarthritis of bilateral knees presented today with with knee pain and swelling involving both knees for the past 5 years. Pain is located anterior. Quality of pain is described as  sharp and achy dull .  Inciting event: none known. Pain is aggravated by  walking and prolonged sitting . Evaluation to date: plain films. Treatment to date:  Tylenol, Ibuprofen, ice/heat, PT (last year), CSI (last 9/26/22). Reports 4-5 month pain relief with steroid injections. Expectations for today's visit includes steroid injections. No interested in surgical management.  Occupation includes retired. PCP is Dr. Nahid Lazcano.    Knee Review of Systems:  Swelling?  yes  Instability?  yes  Mechanical sx?  no  <30 min AM stiffness? yes  Limited ROM? no  Fever/Chills? no    Current Choice of Exercise:  Walking         Objective:      Physical Exam:    /76   Pulse 74   Ht 4' 11" (1.499 m)   Wt 64.6 kg (142 lb 6.4 oz)   BMI 28.76 kg/m²     Appearance:Normal gait/station  FWB  Alignment: Left: normal Right: normal   Soft tissue swelling: Left: no Right: no  Effusion: Left:  Positive Right: Positive  Erythema: Left no Right: no  Ecchymosis: Left: no Right: no  Atrophy: Left: no Right: no     Palpation:  Knee Tenderness: Left: Medial joint line Right: Medial joint line      Range of motion:  Flexion (140): Left:  120 Right: 120  Extension (0): Left: 0 Right: 0     Strength:  Extension: Left 5/5  Pain: no     Right 5/5 Pain: no  Flexion: Left 5/5 Pain: no        Right   5/5 Pain: no     General appearance: NAD  Peripheral pulses: normal bilaterally   Reflexes: Left: normal Right normal   Sensation: normal    Labs:  Last A1c: " 5.2    Imaging:   Previous images reviewed.  X-rays ordered and performed today: no  # of views: 4 Laterality: bilateral  My Interpretation:  No fracture or dislocation. Tricompartmental joint space narrowing, worst in medial compartments.Osteophytes present.     Assessment:        Encounter Diagnoses   Code Name Primary?    M17.0 Primary osteoarthritis of both knees Yes    Z68.28 BMI 28.0-28.9,adult         Plan:           Orders Placed This Encounter   Procedures    Large Joint Aspiration/Injection: bilateral knee     This order was created via procedure documentation     Medications Ordered This Encounter   Medications    LIDOcaine (PF) 10 mg/ml (1%) injection 50 mg    LIDOcaine (PF) 10 mg/ml (1%) injection 50 mg    triamcinolone acetonide injection 40 mg    triamcinolone acetonide injection 40 mg         Dx: bilateral Knee Osteoarthritis. moderate exacerbation.   Treatment Plan: Discussed with patient diagnosis, prognosis, and treatment recommendations. Education provided.    Home physical therapy exercise handouts provided to patient.   topical hot or cold therapy  Over the counter NSAID and/or tylenol provided you do not have contraindications such as but not limited to liver or kidney disease or uncontrolled blood pressure. If you're doctors have told you to to not take them based on your health, do not take them.   oral glucosamine 1500 mg/day.  topical capsaicin as needed  conservative treatment to include oral glucosamine 1500 mg/day; topical capsaicin as needed; hot or cold therapy; adequate vitamin C/D intake  Using a brace provided to you here or from your local pharmacy or durable medical equipment (DME) store.   Imaging: prior radiological studies independently reviewed; discussed with patient; agree with radiologist interpretation.   Weight Management: is paramount. recommend at least 10 pounds weight loss if your bmi >25-29.9. A bmi 24.9 or less may provide further relief..   Procedure: Discussed  CSI/VSI as treatment options; discussed CSI vs VS injections as treatment options; since conservative measures did not improve symptoms patient consented for CSI today.  Activity: Activity as tolerated; HEP to include aerobic conditioning and strength training with non-painful activity. ROM/STG exercises. Proper footware; assistive devises to avoid limping.   Therapy: HEP  Medication: first line treatment with daily acetaminophen. Up to 1000 mg three times daily can be taken; medication precautions given.. Please see your primary care physician for further refills.  RTC: PRN; call if any issues.         Large Joint Aspiration/Injection: bilateral knee    Date/Time: 4/10/2023 9:20 AM  Performed by: Twila Thorne MD  Authorized by: Twila Thorne MD     Consent Done?:  Yes (Written)  Indications:  Arthritis  Site marked: the procedure site was marked    Timeout: prior to procedure the correct patient, procedure, and site was verified    Prep: patient was prepped and draped in usual sterile fashion      Local anesthesia used?: Yes    Local anesthetic:  Topical anesthetic    Details:  Needle Size:  21 G  Location:  Knee  Laterality:  Bilateral  Site:  Bilateral knee  Patient tolerance:  Patient tolerated the procedure well with no immediate complications     Staff: Kade Bahena MD     Risks:  Possible complications with the injection include bleeding, infection (.01%), tendon rupture, steroid flare, fat pad or soft tissue atrophy, skin depigmentation, allergic reaction to medications and vasovagal response. (steroid flare treatment is rest, ice, NSAIDs and resolves in 24-36 hours.)    Consent:  No absolute contraindications (cellulitis overlying joint, infection, lack of informed consent, allergy to injection medication, AVN protein or egg allergy for sodium hyaluronate, or history of steroid flare) or relative contraindications (uncontrolled DM2 A1c>10, coagulopathy, INR > 3.5, previous joint replacement or  history of AVN).        Description:  The patient was prepped in normal sterile fashion use of chlorhexidine scrub and the appropriate and anatomic landmarks were identified without ultrasound.  Contents of syringe included: 5cc of 1% of lidocaine with 40mg of Kenalog     Post Procedure: Patient alert, and moving all extremities. ROM improved, pain decreased to 0/10.  Good peripheral pulses, no signs of vascular compromise and range of motion intact.  Aftercare instructions were given to patient at time of discharge.  Relative rest for 3 days-avoiding excess activity.  Place ice on the area for 15 minutes every 4-6 hours. Patient may take Tylenol a 1000 mg b.i.d. or ibuprofen 600 mg t.i.d. for the next 3-4 days if not on medication already and safe to take pending co-morbidities.  Protect the area for the next 1-8 hours if anesthetic was used.  Avoid excessive activity for the next 3-4 weeks.  ER precautions given for fever, severe joint pain or allergic reaction or other new symptoms related to the joint injection.         Twila Thorne MD  [facility] HO-3

## 2023-04-10 NOTE — PROGRESS NOTES
Faculty Attestation: Mikala Thompson  was seen in Sports Medicine Clinic. Patient seen and evaluated at the time of the visit. History of Present Illness, Physical Exam, and Assessment and Plan reviewed. Treatment plan is reasonable and appropriate. Compliance with treatment recommendations is important.  Radiology images independently reviewed and agree with resident interpretation.  Procedure note reviewed. Present for entire procedure with the resident. Patient tolerated procedure well.      Kade Bahena MD

## 2023-05-22 NOTE — PROGRESS NOTES
[facility]  Otolaryngology Clinic Note    Mikala Thompson  Encounter Date: 10/11/2022  YOB: 1945    Chief Complaint: dizziness    HPI: 4/12/21: 75yoF referred for dizziness. Is having dizzy episodes ~1-2x/day which began 1/2021. Describes as feeling is as if balance is off and lightheaded; is unsure what exacerbates this. Also has occasional brief room spinning when she turns over in the bed. Episodes last for a few seconds. Reports having normal orthostatic VS at last PCP visit. Denies appreciable HL or tinnitus. Denies medication change. Endorses falling on either side of her body in June of 2020 and January of 2021.    5/26/21: Reports dizziness has been greatly improved since Epley and with home maneuvers. Requesting to have her ears cleaned. She uses qtips to clean them at home.    6/17/21: Denies otorrhea or otalgia. Continues to do well from a dizziness standpoint.    10/11/2022: Reports she began having vertiginous dizziness again 4-6 weeks ago which is similar to her previous BPPV. She has not had any falls or medication changes. She is wondering if it is because she has ear wax buildup.     11/22/22: States that dizziness had improved following Epley, however, she had a bad episode again last week. She reports resolution following home Epley demonstrated by audiology, but she is very concerned and anxious about it coming back.    5/23/23: Reports continued dizziness, occurring a few times weekly. Feels spinning to lightheaded, similar to before but not as severe. Episodes do not last long. She is unable to say if there are exacerbating factors. Right ear canal has been itchy & she feels that it needs to be cleaned.    ROS:   10-point review of systems negative except per HPI      Review of patient's allergies indicates:  No Known Allergies    Past Medical History:   Diagnosis Date    Amblyopia     Cataract     Glaucoma     Hyperlipidemia     Hypertension      "Osteoarthritis     Strabismus        Past Surgical History:   Procedure Laterality Date    CATARACT EXTRACTION         Social History     Socioeconomic History    Marital status: Single   Tobacco Use    Smoking status: Never    Smokeless tobacco: Never   Substance and Sexual Activity    Alcohol use: Never    Drug use: Never    Sexual activity: Not Currently     Partners: Male     Birth control/protection: Post-menopausal       Family History   Problem Relation Age of Onset    Cancer Maternal Uncle     Diabetes Maternal Grandmother        Outpatient Encounter Medications as of 10/11/2022   Medication Sig Dispense Refill    amLODIPine (NORVASC) 5 MG tablet Take 5 mg by mouth once daily.      amLODIPine (NORVASC) 5 MG tablet   See Instructions, Take 1 tablet by mouth once daily, # 90 tab(s), 7 Refill(s), Pharmacy: [facility], 150, cm, Height/Length Dosing, 02/01/22 10:09:00 CST, 64.75, kg, Weight Dosing, 02/01/22 10:09:00 CST      cycloSPORINE (RESTASIS) 0.05 % ophthalmic emulsion Place 1 drop into both eyes 2 (two) times daily. 30 each 2    diclofenac (VOLTAREN) 75 MG EC tablet       levothyroxine (SYNTHROID) 137 MCG Tab tablet Take 1/2 (one-half) tablet by mouth once daily 45 tablet 2    losartan (COZAAR) 50 MG tablet       pravastatin (PRAVACHOL) 40 MG tablet   See Instructions, Take 1 tablet by mouth once daily, # 90 tab(s), 5 Refill(s), Pharmacy: [id number], 150, cm, Height/Length Dosing, 02/01/22 10:09:00 CST, 64.75, kg, Weight Dosing, 02/01/22 10:09:00 CST       No facility-administered encounter medications on file as of 10/11/2022.       Physical Exam:  Vitals:    10/11/22 0858   BP: 113/77   BP Location: Right arm   Patient Position: Sitting   BP Method: Medium (Automatic)   Pulse: 80   Temp: 97.8 °F (36.6 °C)   TempSrc: Oral   Weight: 64.1 kg (141 lb 6.4 oz)   Height: 4' 11" (1.499 m)     General: NAD, voice normal  Neuro: AAO, CN II - XII grossly intact  Head/ Face: NCAT, symmetric, " sensations intact bilaterally  Eyes: EOMI, PERRL  Ears: externally normal with grossly normal hearing  AD: EAC with some dry, flaky cerumen- removed using alligator forceps under microscopy to reveal TM intact w/o effusion or retraction  AS: EAC patent. TM intact w/o effusion or retraction  Nose: bilateral nares patent, midline septum, no rhinorrhea, no external deformity, no turbinate hypertrophy  OC/OP: MMM, no intraoral lesions, no trismus, dentition is poor, no uvular deviation, bilaterally symmetric soft palate elevation, palatoglossus and palatopharyngeal fold wnl; tonsils are symmetric and 1+  Indirect laryngoscopy: deferred due to patient intolerance  Neck: soft, supple, no LAD, normal ROM, no thyromegaly  Respiratory: nonlabored, no wheezing, bilateral chest rise  Cardiovascular: RRR  Gastrointestinal: S NT ND  Skin: warm, no lesions  Musculoskeletal: 5/5 strength  Psych: Appropriate affect/mood     Audio:         Assessment/Plan:  77 y.o. female with hx of BPPV of right ear in 2021 which resolved following Epley, now with return of acute symptoms. Audio with moderate SNHL of high frequencies b/l; stable- reviewed. Right BPPV s/p Epley with audiology today.   - Ok to take meclizine prn, however, discussed that this is not a definitive tx  - Safety precautions  - Annual audio  - RTC 6-12mo       Annalisa Mack NP    >30min spent on visit

## 2023-05-23 ENCOUNTER — CLINICAL SUPPORT (OUTPATIENT)
Dept: AUDIOLOGY | Facility: HOSPITAL | Age: 78
End: 2023-05-23
Payer: MEDICARE

## 2023-05-23 ENCOUNTER — OFFICE VISIT (OUTPATIENT)
Dept: OTOLARYNGOLOGY | Facility: CLINIC | Age: 78
End: 2023-05-23
Payer: MEDICARE

## 2023-05-23 VITALS — DIASTOLIC BLOOD PRESSURE: 67 MMHG | HEART RATE: 86 BPM | SYSTOLIC BLOOD PRESSURE: 107 MMHG | TEMPERATURE: 98 F

## 2023-05-23 DIAGNOSIS — H81.11 BPPV (BENIGN PAROXYSMAL POSITIONAL VERTIGO), RIGHT: Primary | ICD-10-CM

## 2023-05-23 DIAGNOSIS — H90.3 SENSORINEURAL HEARING LOSS (SNHL) OF BOTH EARS: ICD-10-CM

## 2023-05-23 DIAGNOSIS — H81.11 BENIGN PAROXYSMAL POSITIONAL VERTIGO OF RIGHT EAR: Primary | ICD-10-CM

## 2023-05-23 DIAGNOSIS — H61.21 IMPACTED CERUMEN OF RIGHT EAR: ICD-10-CM

## 2023-05-23 DIAGNOSIS — R42 DIZZINESS: ICD-10-CM

## 2023-05-23 PROCEDURE — 69210 REMOVE IMPACTED EAR WAX UNI: CPT | Mod: PBBFAC | Performed by: NURSE PRACTITIONER

## 2023-05-23 PROCEDURE — 99214 OFFICE O/P EST MOD 30 MIN: CPT | Mod: 25,S$PBB,, | Performed by: NURSE PRACTITIONER

## 2023-05-23 PROCEDURE — 95992 CANALITH REPOSITIONING PROC: CPT | Performed by: AUDIOLOGIST

## 2023-05-23 PROCEDURE — 69210 PR REMOVAL IMPACTED CERUMEN REQUIRING INSTRUMENTATION, UNILATERAL: ICD-10-PCS | Mod: S$PBB,,, | Performed by: NURSE PRACTITIONER

## 2023-05-23 PROCEDURE — 99214 PR OFFICE/OUTPT VISIT, EST, LEVL IV, 30-39 MIN: ICD-10-PCS | Mod: 25,S$PBB,, | Performed by: NURSE PRACTITIONER

## 2023-05-23 PROCEDURE — 69210 REMOVE IMPACTED EAR WAX UNI: CPT | Mod: S$PBB,,, | Performed by: NURSE PRACTITIONER

## 2023-05-23 PROCEDURE — 99213 OFFICE O/P EST LOW 20 MIN: CPT | Mod: PBBFAC | Performed by: NURSE PRACTITIONER

## 2023-05-23 NOTE — PROGRESS NOTES
Hallpike screening:    Ms. Thompson is in for a repeat hallpike due to suspected right ear BPPV. Historyically, she has tested positive for BPPV in the past with reported successful treatment.       Right ear: Upbeat rotational nystamus observed     Left ear: No nystagmus observed    Treatment:  Due to positive findings of right BPPV, epley maneuver was immediately implemented and repeated twice.      Recommendations:  Will follow up in one month to repeat as symptoms fatigued but did not completely resolve. Gave patient post-maneuver instructions and instructed to call if issues arise sooner.    Katarina Moe, AuD

## 2023-06-17 ENCOUNTER — HOSPITAL ENCOUNTER (EMERGENCY)
Facility: HOSPITAL | Age: 78
Discharge: HOME | End: 2023-06-17
Attending: STUDENT IN AN ORGANIZED HEALTH CARE EDUCATION/TRAINING PROGRAM
Payer: MEDICARE

## 2023-06-17 VITALS
SYSTOLIC BLOOD PRESSURE: 128 MMHG | WEIGHT: 143.19 LBS | BODY MASS INDEX: 28.87 KG/M2 | DIASTOLIC BLOOD PRESSURE: 80 MMHG | HEART RATE: 88 BPM | RESPIRATION RATE: 18 BRPM | HEIGHT: 59 IN | OXYGEN SATURATION: 96 % | TEMPERATURE: 98 F

## 2023-06-17 DIAGNOSIS — K08.409 HISTORY OF TOOTH EXTRACTION, UNSPECIFIED EDENTULISM CLASS: Primary | ICD-10-CM

## 2023-06-17 DIAGNOSIS — K08.89 PAIN, DENTAL: ICD-10-CM

## 2023-06-17 DIAGNOSIS — K04.7 DENTAL INFECTION: ICD-10-CM

## 2023-06-17 PROCEDURE — 99284 EMERGENCY DEPT VISIT MOD MDM: CPT

## 2023-06-17 RX ORDER — DICLOFENAC SODIUM 75 MG/1
75 TABLET, DELAYED RELEASE ORAL 2 TIMES DAILY
Qty: 14 TABLET | Refills: 0 | Status: SHIPPED | OUTPATIENT
Start: 2023-06-17 | End: 2023-06-24

## 2023-06-17 RX ORDER — AMOXICILLIN AND CLAVULANATE POTASSIUM 875; 125 MG/1; MG/1
1 TABLET, FILM COATED ORAL 2 TIMES DAILY
Qty: 14 TABLET | Refills: 0 | Status: SHIPPED | OUTPATIENT
Start: 2023-06-17 | End: 2023-06-27

## 2023-06-17 NOTE — ED PROVIDER NOTES
Encounter Date: 6/17/2023       History     Chief Complaint   Patient presents with    Dental Pain     Pt c/o tooth pain to the right side after having 2 teeth pulled on Monday. She followed up with dentist and he prescribed a mouthwash but she says pain is not improving.     Pt is a 77 y.o. female who presents to the [facility] ED complaining of lower dental pain secondary to multiple dental extractions which occurred on Monday of last week. Denies SOB, weakness, dizziness, fever, abdominal pain, chest pain, or inability to swallow. Hx of HTN, Hypothyroidism, Arthritis. Reports being given a mouth wash after her procedure which is not helping with symptoms.     Review of patient's allergies indicates:  No Known Allergies  Past Medical History:   Diagnosis Date    Amblyopia     Benign paroxysmal vertigo, bilateral     Cataract     Glaucoma     Hyperlipidemia     Hypertension     Hypothyroidism, unspecified     Osteoarthritis     Strabismus      Past Surgical History:   Procedure Laterality Date    CATARACT EXTRACTION      EYE SURGERY  10/2020    Cataracts    FRACTURE SURGERY Left 06/2020    Wrist    TUBAL LIGATION  02/1980     Family History   Problem Relation Age of Onset    Cancer Maternal Uncle     Diabetes Maternal Grandmother      Social History     Tobacco Use    Smoking status: Never    Smokeless tobacco: Never   Substance Use Topics    Alcohol use: Never    Drug use: Never     Review of Systems   Constitutional:  Negative for chills, diaphoresis, fatigue and fever.   HENT:  Positive for dental problem. Negative for facial swelling, rhinorrhea, sinus pressure, sinus pain, sore throat and trouble swallowing.    Respiratory:  Negative for cough, chest tightness, shortness of breath and wheezing.    Cardiovascular:  Negative for chest pain, palpitations and leg swelling.   Gastrointestinal:  Negative for abdominal pain, diarrhea, nausea and vomiting.   Genitourinary:  Negative for dysuria, flank pain, frequency,  hematuria and urgency.   Musculoskeletal:  Negative for arthralgias, back pain, joint swelling and myalgias.   Skin:  Negative for color change and rash.   Neurological:  Negative for dizziness, syncope, weakness and light-headedness.   Hematological:  Does not bruise/bleed easily.   All other systems reviewed and are negative.    Physical Exam     Initial Vitals [06/17/23 1142]   BP Pulse Resp Temp SpO2   128/80 88 18 98.1 °F (36.7 °C) 96 %      MAP       --         Physical Exam    Nursing note and vitals reviewed.  Constitutional: She appears well-developed and well-nourished.   HENT:   Head: Normocephalic and atraumatic.   Nose: Nose normal.   Mouth/Throat: Oropharynx is clear and moist. Oral lesions present. Abnormal dentition.       Swelling, tenderness, erythema to gum line. Wounds noted from recent extractions. Concerning for infectious process.   Eyes: Conjunctivae and EOM are normal. Pupils are equal, round, and reactive to light.   Neck: Neck supple.   Normal range of motion.  Cardiovascular:  Normal rate, regular rhythm, normal heart sounds and intact distal pulses.           Pulmonary/Chest: Effort normal and breath sounds normal. No respiratory distress. She has no wheezes. She has no rhonchi. She has no rales. She exhibits no tenderness.   Abdominal: Abdomen is soft and flat. Bowel sounds are normal. She exhibits no distension. There is no abdominal tenderness. There is no rebound, no guarding, no tenderness at McBurney's point and negative Hughes's sign. negative psoas sign  Musculoskeletal:         General: Normal range of motion.      Cervical back: Normal range of motion and neck supple.     Neurological: She is alert and oriented to person, place, and time. She has normal strength and normal reflexes.   Skin: Skin is warm and dry. Capillary refill takes less than 2 seconds.   Psychiatric: She has a normal mood and affect. Her speech is normal and behavior is normal. Judgment and thought content  normal.       ED Course   Procedures  Labs Reviewed - No data to display       Imaging Results    None          Medications - No data to display  Medical Decision Making:   Differential Diagnosis:   Dental infection  Dental pain  ED Management:  12:13 PM Reassessed patient at this time. Reports condition has improved. Discussed with patient all pertinent ED information and results. Discussed diagnosis and treatment plan with patient. Follow up instructions and return to ED instruction have been given. All questions and concerns were addressed at this time. Patient voices understanding of information and instructions. Patient is comfortable with plan and discharge. Patient is stable for discharge.        APC / Resident Notes:   I was not physically present during the history, exam or disposition of this patient. I was available at all times for consultation. (Zmora)                   Clinical Impression:   Final diagnoses:  [K08.409] History of tooth extraction, unspecified edentulism class (Primary)  [K04.7] Dental infection  [K08.89] Pain, dental        ED Disposition Condition    Discharge Stable          ED Prescriptions       Medication Sig Dispense Start Date End Date Auth. Provider    diclofenac (VOLTAREN) 75 MG EC tablet Take 1 tablet (75 mg total) by mouth 2 (two) times daily. for 7 days 14 tablet 6/17/2023 6/24/2023 Shabbir Ott Jr., AIDA    amoxicillin-clavulanate 875-125mg (AUGMENTIN) 875-125 mg per tablet Take 1 tablet by mouth 2 (two) times daily. 14 tablet 6/17/2023 -- AIDA Liang Jr.          Follow-up Information       Follow up With Specialties Details Why Contact Info    Nahid Lazcano, DO Internal Medicine In 3 days  [address] [address]  [phone]      [facility] Emergency Medicine In 3 days As needed, If symptoms worsen [address] [facility] [address] [phone]             AIDA Liang Jr.  06/17/23 7240        Nahid Palomares MD  06/17/23 2052

## 2023-06-17 NOTE — DISCHARGE INSTRUCTIONS
Follow up with your primary care physician in 3-5 days for follow up evaluation.  Take medication as prescribed.  Follow up with your dentist as scheduled.  Take pain medication as prescribed for no more than 7 days.

## 2023-06-26 ENCOUNTER — CLINICAL SUPPORT (OUTPATIENT)
Dept: AUDIOLOGY | Facility: HOSPITAL | Age: 78
End: 2023-06-26
Payer: MEDICARE

## 2023-06-26 DIAGNOSIS — H90.3 SENSORINEURAL HEARING LOSS (SNHL) OF BOTH EARS: ICD-10-CM

## 2023-06-26 DIAGNOSIS — H81.11 BPPV (BENIGN PAROXYSMAL POSITIONAL VERTIGO), RIGHT: ICD-10-CM

## 2023-06-26 PROCEDURE — 95992 CANALITH REPOSITIONING PROC: CPT | Performed by: AUDIOLOGIST

## 2023-06-26 NOTE — PROGRESS NOTES
Hallpike screening:    Ms. Thompson is in for a repeat hallpike screening from persistent right BPPV. Today she reports some improvement in symptomology and endorses more light-headedness. All additional history is unremarkable.     Right ear:  Slight upbeat rotational vertigo observed     Left ear: Negative for BPPV    Epley maneuver implemented to the right side for the purpose of treating right posterior SCC BPPV.  Post-maneuver instructions given. Ms. Thompson was instructed to call if symptoms return. No further treatment needed at this time.      Katarina Moe, AuD

## 2023-06-27 ENCOUNTER — TELEPHONE (OUTPATIENT)
Dept: ENDOCRINOLOGY | Facility: CLINIC | Age: 78
End: 2023-06-27
Payer: MEDICARE

## 2023-06-27 ENCOUNTER — OFFICE VISIT (OUTPATIENT)
Dept: INTERNAL MEDICINE | Facility: CLINIC | Age: 78
End: 2023-06-27
Payer: MEDICARE

## 2023-06-27 VITALS
RESPIRATION RATE: 20 BRPM | SYSTOLIC BLOOD PRESSURE: 137 MMHG | WEIGHT: 142.38 LBS | OXYGEN SATURATION: 99 % | BODY MASS INDEX: 28.7 KG/M2 | HEART RATE: 71 BPM | TEMPERATURE: 98 F | HEIGHT: 59 IN | DIASTOLIC BLOOD PRESSURE: 83 MMHG

## 2023-06-27 DIAGNOSIS — M35.01 KERATOCONJUNCTIVITIS SICCA: ICD-10-CM

## 2023-06-27 DIAGNOSIS — E55.9 VITAMIN D DEFICIENCY: ICD-10-CM

## 2023-06-27 DIAGNOSIS — E21.0 PRIMARY HYPERPARATHYROIDISM: Primary | ICD-10-CM

## 2023-06-27 DIAGNOSIS — Z00.00 HEALTHCARE MAINTENANCE: ICD-10-CM

## 2023-06-27 DIAGNOSIS — E21.3 HYPERPARATHYROIDISM: ICD-10-CM

## 2023-06-27 PROBLEM — H16.229 KERATOCONJUNCTIVITIS SICCA: Status: ACTIVE | Noted: 2023-06-27

## 2023-06-27 PROCEDURE — 99214 OFFICE O/P EST MOD 30 MIN: CPT | Mod: PBBFAC

## 2023-06-27 RX ORDER — METHYLPREDNISOLONE 4 MG
TABLET, DOSE PACK ORAL
Refills: 0 | Status: CANCELLED | OUTPATIENT
Start: 2023-06-27

## 2023-06-27 RX ORDER — CHLORHEXIDINE GLUCONATE ORAL RINSE 1.2 MG/ML
SOLUTION DENTAL
COMMUNITY
Start: 2023-06-15 | End: 2023-08-22

## 2023-06-27 NOTE — PROGRESS NOTES
[facility] Internal Medicine Clinic Visit    Chief Complaint:      Follow-up (Patient states still having a little insomnia. Patient also states she has bilateral leg swelling.)     Subjective:     HPI:  Mikala Thompson is a 77 y.o. female who has a past medical history of Amblyopia, Benign paroxysmal vertigo, bilateral, Cataract, Glaucoma, Hyperlipidemia, Hypertension, Hypothyroidism, unspecified, Osteoarthritis, and Strabismus.  She presents to clinic today for follow-up of chronic medical conditions. No new complaints today although she is having knee pain today she follows PRN with sports medicine and last got CSI 9/22.  Sx worse in the cold weather. States she takes ibuprofen intermittently (3 times this past week) which does help some, voltaren gel, lidocaine patches with mixed amounts of relief. She follows with endocrine for hypothyroidism, osteoporosis, primary hyperparathyroidism. For hyperparathyroid she has declined surgical intervention, elected for medical management. Prolia q6months for osteoporosis. Denies any chest pain, abd pain, N/V/D, constipation, SOB, fevers, paresthesias.  Discussed sleep hygiene for insomnia concern.  Does get some relief with melatonin she takes it home.      Past Medical History:   Diagnosis Date    Amblyopia     Benign paroxysmal vertigo, bilateral     Cataract     Glaucoma     Hyperlipidemia     Hypertension     Hypothyroidism, unspecified     Osteoarthritis     Strabismus        Past Surgical History:   Procedure Laterality Date    CATARACT EXTRACTION      EYE SURGERY  10/2020    Cataracts    FRACTURE SURGERY Left 06/2020    Wrist    TUBAL LIGATION  02/1980       Social History     Socioeconomic History    Marital status: Single   Tobacco Use    Smoking status: Never    Smokeless tobacco: Never   Substance and Sexual Activity    Alcohol use: Never    Drug use: Never    Sexual activity: Not Currently     Partners: Male     Birth control/protection: Post-menopausal     Social  Determinants of Health     Financial Resource Strain: Low Risk     Difficulty of Paying Living Expenses: Not hard at all   Food Insecurity: Food Insecurity Present    Worried About Running Out of Food in the Last Year: Sometimes true    Ran Out of Food in the Last Year: Sometimes true   Transportation Needs: No Transportation Needs    Lack of Transportation (Medical): No    Lack of Transportation (Non-Medical): No   Physical Activity: Sufficiently Active    Days of Exercise per Week: 3 days    Minutes of Exercise per Session: 60 min   Stress: No Stress Concern Present    Feeling of Stress : Only a little   Social Connections: Socially Isolated    Frequency of Communication with Friends and Family: More than three times a week    Frequency of Social Gatherings with Friends and Family: Three times a week    Attends [employer] Services: Never    Active Member of Clubs or Organizations: No    Attends Club or Organization Meetings: Never    Marital Status:    Housing Stability: Unknown    Unable to Pay for Housing in the Last Year: No    Number of Places Lived in the Last Year: 1         Current Outpatient Medications   Medication Instructions    amLODIPine (NORVASC) 5 MG tablet See Instructions, Take 1 tablet by mouth once daily, # 90 tab(s), 7 Refill(s), Pharmacy: [id number], 150, cm, Height/Length Dosing, 02/01/22 10:09:00 CST, 64.75, kg, Weight Dosing, 02/01/22 10:09:00 CST    amoxicillin-clavulanate 875-125mg (AUGMENTIN) 875-125 mg per tablet 1 tablet, Oral, 2 times daily    chlorhexidine (PERIDEX) 0.12 % solution SMARTSIG:By Mouth    levothyroxine (SYNTHROID) 137 MCG Tab tablet Take 1/2 (one-half) tablet by mouth once daily    losartan (COZAAR) 50 mg, Oral, Daily    pravastatin (PRAVACHOL) 40 MG tablet See Instructions, Take 1 tablet by mouth once daily, # 90 tab(s), 5 Refill(s), Pharmacy: [id number], 150, cm, Height/Length Dosing, 02/01/22 10:09:00 CST, 64.75, kg, Weight Dosing,  "02/01/22 10:09:00 CST       Review of Systems  Review of Systems   Constitutional:  Negative for chills and fever.   HENT:  Negative for sore throat.    Eyes:  Negative for photophobia and pain.   Respiratory:  Negative for cough and shortness of breath.    Cardiovascular:  Negative for chest pain and palpitations.   Gastrointestinal:  Negative for abdominal pain, blood in stool, constipation, diarrhea, nausea and vomiting.   Genitourinary:  Negative for flank pain and hematuria.   Musculoskeletal:  Positive for joint pain (chronic). Negative for falls and myalgias.   Skin:  Negative for itching and rash.   Neurological:  Negative for dizziness, tingling, tremors, sensory change, speech change, focal weakness, seizures, loss of consciousness, weakness and headaches.   Endo/Heme/Allergies:  Does not bruise/bleed easily.       Objective:   Last 24 Hour Vital Signs:  Vitals  BP: 137/83  Temp: 97.8 °F (36.6 °C)  Temp Source: Oral  Pulse: 71  Resp: 20  SpO2: 99 %  Height: 4' 11" (149.9 cm)  Weight: 64.6 kg (142 lb 6.4 oz)    136/70 on manual recheck.     Physical Examination:  Vital signs and nursing notes reviewed.  Constitutional: Patient is in NAD. Awake and alert.    Head: Atraumatic. Normocephalic.  Eyes: Conjunctivae nl. No scleral icterus. Strabismus to left eye.   ENT: Mucous membranes are moist. Oropharynx is clear.  Neck: Supple. Full ROM. No lymphadenopathy.  Cardiovascular: Regular rate and rhythm. No murmurs, rubs, or gallops. Distal pulses are 2+ and symmetric .  Pulmonary/Chest: No respiratory distress. Clear to auscultation bilaterally. No wheezing, rales, or rhonchi.  Abdominal: Soft. Non-distended. No TTP. No rebound, guarding, or rigidity.   Musculoskeletal: Moves all extremities. No edema.  Mild area of swelling to just below the medial knee.  Mild tenderness.  Full range of motion of bilateral knees.  No tenderness to the joint lines.  Skin: Warm and dry.  Neurological: Awake and alert. No acute " focal neurological deficits are appreciated.        Assessment & Plan:     Hypertension  -well controlled.  -Continue amlodipine 5, losartan 50     Hyperlipidemia  -Cholesterol 210,  on 11/15/22.   -Advised patient to avoid fatty, greasy and fried foods and to incorporate more fruits and vegetables in her diet  -Continue pravastatin 40    Hypothyroidism  - TSH wnl on 7/27/2022 however TPO and thyroglobulin +ve - suggestive of Hashimotos  - On Synthroid 68.5mcg daily  - No new symptoms     primary hyperparathyroidism  -Following up with endocrinology, she deferred surgical management at this time   -Continue monitoring Ca levels     Bilateral wrist osteoarthritis/knee osteoarthritis  Suspect pes anserine bursitis.  - Advised against the chronic use of NSAIDs given her slightly depressed renal function  - Follows with sports medicine clinic for steroid injections which provides great relief for pt. pt to f/u PRN. She will let me know if she needs another referral.   - Patient currently not on any NSAIDs     Osteoporosis given fragility fracture  -DEXA scan on 11/2020 showed osteopenia in the femoral necks  -T score of -2.2 of left femoral neck and -2.4 on right femoral neck  -Alendronate DCed on 7/2021 given GI adverse effects an started on Prolia with q6mts (due in feb 2023), monitor Ca levels  - DEXA 12/12/22 The bilateral total mean femoral bone mineral density is equal to 0.752 g/cm squared with a T score of -2.0. This is improved from prior in 2020.      Left distal radius fracture s/p ORIF -recovered  -Had a left distal radius fx on 6/30/2020 and underwent ORIF on 7/21/2020  -Continue f/u with Ortho     Glaucoma  Blepharitis/keratoconjunctivitis  Strabismus.   Cataracts  -Continue f/u with Ophthalmology     Traumatic chronic rotatory cuff tear  -MRI upper extremity right side showed subacute appearing and mildly impacted fracture of the humeral neck with extension to the posterolateral humeral head, no  gross fragment displacement identified.  Suspected chronic partial-thickness tear of the supraspinatus tendon  -continue following up with ortho clinic.     Stage 2 CKD  -eGFR on 7/2021 was 72, stable  - 3/22 was 70 - stable.  - eGFR > 60 today   Continue renal sparing activities:  -Follow low sodium diet (2 grams a day)  -Control high blood pressure ( goal BP < 130/80, please record BP at home every day and bring log to next office visit)  -Exercise at least 30 minutes a day, 5 days a week.  -Maintain healthy weight.  -Decrease or stop alcohol use  -Do not smoke  -Stay well hydrated  -Receive Pneumovax, Flu, and HBV vaccines as indicated.  -Do not take NSAIDs (Ibuprofen, Naproxen, Aleve, Advil, Toradol, Mobic), may take only Tylenol as needed for pain/headaches.  -Take cholesterol-lowering medications as prescribed (LDL goal <100)      BBPV  -SNHL  -Good resolution of symptoms after Epley maneuver  -Continue follow-up with ENT/audiology appt is later this month.  - Ok to take dramamine or meclizine prn, however, this is not a definitive tx       Health Maintenance  Colon cancer screening: Normal in 2013. Cologuard ordered.   Lung Cancer screen: not indicated.  Mammogram: continue annual screening. Normal October 2022.  PapSmear: has discussed with GYN, no longer required.   Hep C: non-reactive.  Dexa: UTD. see above note of osteoporosis  Vaccines      Pneumonia: UTD      Annual Flu: UTD      Zoster: declines.      Tdap: 7/25/18    Immunization History   Administered Date(s) Administered    COVID-19 Vaccine 10/25/2021, 04/11/2022    COVID-19, MRNA, LN-S, PF (MODERNA FULL 0.5 ML DOSE) 02/19/2021, 03/19/2021, 10/25/2021    Influenza 11/13/2017    Influenza - High Dose - PF (65 years and older) 11/04/2015, 11/13/2017, 11/12/2018, 11/05/2019    Influenza - Quadrivalent 12/14/2016    Influenza - Quadrivalent - High Dose - PF (65 years and older) 10/21/2020, 10/04/2021, 10/11/2022    Pneumococcal 11/04/2015     Pneumococcal Conjugate - 13 Valent 07/18/2019    Pneumococcal Polysaccharide - 23 Valent 11/04/2015    Tdap 07/25/2018, 07/25/2018           Follow-ups  -Follow-up medicine clinic in 4 months  - Optho   - ENT For BBPV, Annual audio  - endo 8/22/2023  - GYN 10/10/2023      Nahid Lazcano DO  [facility] Internal Medicine, [facility]

## 2023-06-27 NOTE — TELEPHONE ENCOUNTER
Pt did labs 2 months prior to f/u appt.  Ok to add to the cancellation list to be seen earlier if she wants otherwise will discuss then.

## 2023-06-28 ENCOUNTER — PATIENT MESSAGE (OUTPATIENT)
Dept: ENDOCRINOLOGY | Facility: CLINIC | Age: 78
End: 2023-06-28
Payer: MEDICARE

## 2023-07-27 ENCOUNTER — TELEPHONE (OUTPATIENT)
Dept: ENDOCRINOLOGY | Facility: CLINIC | Age: 78
End: 2023-07-27
Payer: MEDICARE

## 2023-07-27 ENCOUNTER — PATIENT MESSAGE (OUTPATIENT)
Dept: ENDOCRINOLOGY | Facility: CLINIC | Age: 78
End: 2023-07-27
Payer: MEDICARE

## 2023-07-27 NOTE — TELEPHONE ENCOUNTER
----- Message from Renata Wu sent at 7/27/2023 10:04 AM CDT -----  Regarding: Labs  [facility] PT       Pt is requesting updated labs before her appt on 8/22/23.   Pt # [phone]

## 2023-08-22 ENCOUNTER — OFFICE VISIT (OUTPATIENT)
Dept: ENDOCRINOLOGY | Facility: CLINIC | Age: 78
End: 2023-08-22
Payer: MEDICARE

## 2023-08-22 VITALS
HEART RATE: 74 BPM | WEIGHT: 142.31 LBS | TEMPERATURE: 97 F | SYSTOLIC BLOOD PRESSURE: 151 MMHG | DIASTOLIC BLOOD PRESSURE: 77 MMHG | BODY MASS INDEX: 28.69 KG/M2 | RESPIRATION RATE: 16 BRPM | HEIGHT: 59 IN

## 2023-08-22 DIAGNOSIS — E21.0 PRIMARY HYPERPARATHYROIDISM: Primary | ICD-10-CM

## 2023-08-22 DIAGNOSIS — M81.0 OSTEOPOROSIS, UNSPECIFIED OSTEOPOROSIS TYPE, UNSPECIFIED PATHOLOGICAL FRACTURE PRESENCE: ICD-10-CM

## 2023-08-22 DIAGNOSIS — E06.3 HASHIMOTO'S THYROIDITIS: ICD-10-CM

## 2023-08-22 PROCEDURE — 96372 THER/PROPH/DIAG INJ SC/IM: CPT | Mod: PBBFAC

## 2023-08-22 PROCEDURE — 99213 OFFICE O/P EST LOW 20 MIN: CPT | Mod: PBBFAC

## 2023-08-22 RX ADMIN — DENOSUMAB 60 MG: 60 INJECTION SUBCUTANEOUS at 09:08

## 2023-08-22 NOTE — PROGRESS NOTES
Endocrinology Clinic Note    Patient Name: Mikala Thompson  LLUVIAB: 1945   MRN: [id number]  Date: 08/22/2023  Home Address: [address]  [address] [place] [id number]      Subjective:     Chief Complaint:   Chief Complaint   Patient presents with    Osteoporosis       HPI:  The patient is a 77 y.o. year old female with hx of Primary hyperparathyroidism, Hypercalcemia, Osteoporosis, and Hashimoto's Thyroiditis.     Since last visit she reports doing well. She has no complaints today. Taking her medications as prescribed. No AE with levothyroxine. Continues to take low dose vitamin D supplementation and level within normal. Getting DEXAs.     Available notes and lab results since last visit were reviewed.    Past Medical History:   Diagnosis Date    Amblyopia     Benign paroxysmal vertigo, bilateral     Cataract     Glaucoma     Hyperlipidemia     Hypertension     Hypothyroidism, unspecified     Osteoarthritis     Strabismus         Past Surgical History:   Procedure Laterality Date    CATARACT EXTRACTION      EYE SURGERY  10/2020    Cataracts    FRACTURE SURGERY Left 06/2020    Wrist    TUBAL LIGATION  02/1980       Allergy:  Review of patient's allergies indicates:  No Known Allergies     Current Medications:    Current Outpatient Medications:     amLODIPine (NORVASC) 5 MG tablet, See Instructions, Take 1 tablet by mouth once daily, # 90 tab(s), 7 Refill(s), Pharmacy: [id number], 150, cm, Height/Length Dosing, 02/01/22 10:09:00 CST, 64.75, kg, Weight Dosing, 02/01/22 10:09:00 CST, Disp: 90 tablet, Rfl: 1    levothyroxine (SYNTHROID) 137 MCG Tab tablet, Take 1/2 (one-half) tablet by mouth once daily, Disp: 45 tablet, Rfl: 1    losartan (COZAAR) 50 MG tablet, Take 1 tablet (50 mg total) by mouth once daily., Disp: 90 tablet, Rfl: 2    pravastatin (PRAVACHOL) 40 MG tablet, See Instructions, Take 1 tablet by mouth once daily, # 90 tab(s), 5 Refill(s), Pharmacy: [id number], 150, cm,  "Height/Length Dosing, 02/01/22 10:09:00 CST, 64.75, kg, Weight Dosing, 02/01/22 10:09:00 CST, Disp: 90 tablet, Rfl: 1     Social History:   reports that she has never smoked. She has never used smokeless tobacco. She reports that she does not currently use alcohol. She reports that she does not use drugs.     Family History   Problem Relation Age of Onset    Cancer Maternal Uncle     Diabetes Maternal Grandmother         Immunization History   Administered Date(s) Administered    COVID-19 Vaccine 10/25/2021, 04/11/2022    COVID-19, MRNA, LN-S, PF (MODERNA FULL 0.5 ML DOSE) 02/19/2021, 03/19/2021, 10/25/2021    Influenza 11/13/2017    Influenza - High Dose - PF (65 years and older) 11/04/2015, 11/13/2017, 11/12/2018, 11/05/2019    Influenza - Quadrivalent 12/14/2016    Influenza - Quadrivalent - High Dose - PF (65 years and older) 10/21/2020, 10/04/2021, 10/11/2022    Pneumococcal 11/04/2015    Pneumococcal Conjugate - 13 Valent 07/18/2019    Pneumococcal Polysaccharide - 23 Valent 11/04/2015    Tdap 07/25/2018, 07/25/2018       Review of Systems   Constitutional:  Negative for chills and fever.   Respiratory:  Negative for shortness of breath.    Cardiovascular:  Negative for chest pain.   Musculoskeletal:  Negative for back pain and neck pain.   Neurological:  Negative for tremors and weakness.       Objective:      Physical Exam  Vitals:    08/22/23 0847 08/22/23 0849   BP: (!) 146/81 (!) 151/77   Pulse: 74    Resp: 16    Temp: 97.4 °F (36.3 °C)    TempSrc: Oral    Weight: 64.5 kg (142 lb 4.8 oz)    Height: 4' 11" (1.499 m)         Wt Readings from Last 3 Encounters:   08/22/23 64.5 kg (142 lb 4.8 oz)   06/27/23 64.6 kg (142 lb 6.4 oz)   06/17/23 64.9 kg (143 lb 3 oz)       Physical Exam  Vitals and nursing note reviewed.   Constitutional:       General: She is not in acute distress.     Appearance: Normal appearance. She is normal weight.   Eyes:      Extraocular Movements: Extraocular movements intact.      " Conjunctiva/sclera: Conjunctivae normal.      Pupils: Pupils are equal, round, and reactive to light.   Cardiovascular:      Rate and Rhythm: Normal rate and regular rhythm.      Pulses: Normal pulses.   Pulmonary:      Effort: Pulmonary effort is normal.   Musculoskeletal:         General: No swelling.      Right lower leg: No edema.      Left lower leg: No edema.   Skin:     General: Skin is warm and dry.   Neurological:      General: No focal deficit present.      Mental Status: She is alert and oriented to person, place, and time.   Psychiatric:         Mood and Affect: Mood normal.         Behavior: Behavior normal.          Body mass index is 28.74 kg/m².      Lab Visit on 06/27/2023   Component Date Value Ref Range Status    Sodium Level 06/27/2023 142  136 - 145 mmol/L Final    Potassium Level 06/27/2023 5.0  3.5 - 5.1 mmol/L Final    Chloride 06/27/2023 109 (H)  98 - 107 mmol/L Final    Carbon Dioxide 06/27/2023 26  23 - 31 mmol/L Final    Glucose Level 06/27/2023 90  82 - 115 mg/dL Final    Blood Urea Nitrogen 06/27/2023 17.2  9.8 - 20.1 mg/dL Final    Creatinine 06/27/2023 0.86  0.55 - 1.02 mg/dL Final    Calcium Level Total 06/27/2023 10.6 (H)  8.4 - 10.2 mg/dL Final    Albumin Level 06/27/2023 3.7  3.4 - 4.8 g/dL Final    Phosphorus Level 06/27/2023 2.8  2.3 - 4.7 mg/dL Final    eGFR 06/27/2023 >60  mls/min/1.73/m2 Final    Vit D 25 OH 06/27/2023 60.0  30.0 - 80.0 ng/mL Final    WBC 06/27/2023 7.36  4.50 - 11.50 x10(3)/mcL Final    RBC 06/27/2023 4.32  4.20 - 5.40 x10(6)/mcL Final    Hgb 06/27/2023 13.0  12.0 - 16.0 g/dL Final    Hct 06/27/2023 40.6  37.0 - 47.0 % Final    MCV 06/27/2023 94.0  80.0 - 94.0 fL Final    MCH 06/27/2023 30.1  27.0 - 31.0 pg Final    MCHC 06/27/2023 32.0 (L)  33.0 - 36.0 g/dL Final    RDW 06/27/2023 13.1  11.5 - 17.0 % Final    Platelet 06/27/2023 336  130 - 400 x10(3)/mcL Final    MPV 06/27/2023 11.0 (H)  7.4 - 10.4 fL Final    Neut % 06/27/2023 46.2  % Final    Lymph %  06/27/2023 31.4  % Final    Mono % 06/27/2023 7.3  % Final    Eos % 06/27/2023 13.7  % Final    Basophil % 06/27/2023 1.1  % Final    Lymph # 06/27/2023 2.31  0.6 - 4.6 x10(3)/mcL Final    Neut # 06/27/2023 3.40  2.1 - 9.2 x10(3)/mcL Final    Mono # 06/27/2023 0.54  0.1 - 1.3 x10(3)/mcL Final    Eos # 06/27/2023 1.01 (H)  0 - 0.9 x10(3)/mcL Final    Baso # 06/27/2023 0.08  <=0.2 x10(3)/mcL Final    IG# 06/27/2023 0.02  0 - 0.04 x10(3)/mcL Final    IG% 06/27/2023 0.3  % Final    NRBC% 06/27/2023 0.0  % Final   Lab Visit on 03/09/2023   Component Date Value Ref Range Status    Sodium Level 03/09/2023 142  136 - 145 mmol/L Final    Potassium Level 03/09/2023 4.9  3.5 - 5.1 mmol/L Final    Chloride 03/09/2023 109 (H)  98 - 107 mmol/L Final    Carbon Dioxide 03/09/2023 25  23 - 31 mmol/L Final    Glucose Level 03/09/2023 89  82 - 115 mg/dL Final    Blood Urea Nitrogen 03/09/2023 12.8  9.8 - 20.1 mg/dL Final    Creatinine 03/09/2023 0.85  0.55 - 1.02 mg/dL Final    Calcium Level Total 03/09/2023 10.5 (H)  8.4 - 10.2 mg/dL Final    Albumin Level 03/09/2023 3.9  3.4 - 4.8 g/dL Final    Phosphorus Level 03/09/2023 2.5  2.3 - 4.7 mg/dL Final    eGFR 03/09/2023 >60  mls/min/1.73/m2 Final    Vit D 25 OH 03/09/2023 46.5  30.0 - 80.0 ng/mL Final    Thyroid Stimulating Hormone 03/09/2023 1.293  0.350 - 4.940 uIU/mL Final           Assessment:   Primary Hyperparathyroidism with hypercalcemia  Osteoporeosis  Hypothyroidism     Plan  DEXA last done 12/22 still has evidence of osteopenia  Cont Prolia every 6 months. Due today  Vitamin D 60. Continue low dose supplementation  TSH stable at 1.2. Continue Levothyroxine 137mcg  Still does not want to pursue surgery for Primary Hyperparathyroidism    Ordered today: Renal function panel. TSH, Vitamin D.       Disposition: RTC in 6mo    Gordon Kline, DO  Internal Medicine - PGY-3

## 2023-08-31 ENCOUNTER — OFFICE VISIT (OUTPATIENT)
Dept: OPHTHALMOLOGY | Facility: CLINIC | Age: 78
End: 2023-08-31
Payer: MEDICARE

## 2023-08-31 VITALS — BODY MASS INDEX: 28.63 KG/M2 | WEIGHT: 142 LBS | HEIGHT: 59 IN

## 2023-08-31 DIAGNOSIS — H50.00 ESOTROPIA: Primary | ICD-10-CM

## 2023-08-31 PROCEDURE — 99213 OFFICE O/P EST LOW 20 MIN: CPT | Mod: PBBFAC,PO | Performed by: OPHTHALMOLOGY

## 2023-08-31 NOTE — PROGRESS NOTES
HPI    RTC 4 months OCT RNFL  Patient is still  having burning eyes and would like to know what eye   drops she can use because she is currently using OTC and it is not   helping, she would also like updted MRX today if possible   Last edited by Liseth Johnson MA on 8/31/2023  8:38 AM.            Assessment /Plan     For exam results, see Encounter Report.    HPI    RTC 4 months OCT RNFL  Patient is still  having burning eyes and would like to know what eye   drops she can use because she is currently using OTC and it is not   helping, she would also like updted MRX today if possible   Last edited by Liseth Johnson MA on 8/31/2023  8:38 AM.      Assessment /Plan     For exam results, see Encounter Report.    There are no diagnoses linked to this encounter.    Ancillary Testing    OCTrnfl  8/31/2023: OD: thinning nasally, unreliable due to difficulty finding the cup.  OS: unreliable due to blockage superiorly    HVF 24-2  - 7/19/22  OD: 2/10 FL 0% FP 0% FN, nonspecific scattered deficits  OS: 1/10 FL 4% FP 0% FN, full field  - 11/18/22  OD: FL 0/11, FP 5%, FN 0%, few central defects  OS: FL 9/11, FP 0%, FN 7%, enlarged blind spot/dense defect temporally    Assessment /Plan     For exam results, see Encounter Report.    There are no diagnoses linked to this encounter.       1. Myopia OU with posterior staphyloma OS, esotropia alternating  - Patient with long standing myopia. Does have hx of thyroid disease but not c/w restrictive disease.   - On further questioning she reports her left eye has always been weaker than her right eye. Esotropia started after cataract surgery OS.   - ~25PD ET in primary gaze stable. Alternating  - Patient saw Dr. Woodard 3/10/22 at [facility] for evaluation, had MRI orbits performed before surgical planning; per read, patient has oblong configuration of globes (unable to view images) -- this may be representative of her myopia and staphyloma  - 11/18/22: Planned for surgery multiple times but  cancelled for various reasons. Discussed with patient today that she also has visually significant cataracts in the right eye, may benefit from cataract surgery first then see if/how it affects strabismus as we had previously operated on her non-dominant eye. Patient agrees with plan but cannot be dilated today.   -8/31/23: RX is similar to current glasses.  RX given for new glasses.  Will refer to [place] or [place] for muscle surgery.  2. Combined form of age-related cataract, OD  - BCVA 20/40 11/18/22- 1/3/22:   Patient here for cat eval.  Vision last visit was worse than 20 40.  Patient now with 20 30 vision.  Likely cause of improvement due to fluctuating severe dry eye.  Discussed starting GenTeal gel.  -8/31/23: The vision OD is 20/40.  She is currently asymptomatic.  Will not recommend cataract surgery at this point.     3. Open angle with borderline glaucoma findings, low risk, both eyes  - Based on CDR asymmetry  - CCT thin OS>OD  - Gonio open to CBB  - OCT RNFL 1/25/22: IN thinning OD, full OS  - HVF   7/2022 essentially full with nonspecific deficits OD (does not correlate with OCT thinning)  11/2022: full with nonspecific central defects right eye // left eye unreliable due to high FL  - Given IOP mid-low teens and tilted disc likely obscuring OCT findings, continue to monitor off drops.  -8/31/23:  The OCT is still unreliable.  But IOP is still excellent.  Will continue to monitor.  On next visit, will dilate and recheck the C/D size.    4. Blepharitis and Keratoconjunctivitis sicca OU  - With complaint of burning most hours of the day  - Did not notice improvement with lotemax, restasis, xiidra, BCL, Avenova, punctal plugs  - Has punctal plug in place OS, absent OD  - Lab work: SSA, SSB, YAW, RF, ACE/lysozyme, scleroderma, Anti-smith, CRP/ESR all within normal limits  - Current regimen: PFATs q3 hours, WC & LS 1-2 times/day  - 1/3/22 start GenTeal gel is discussed in 2.  -8/31/23: She is  doing well with PF tears.  The cornea is smooth.  She can use genteal gel as often as she needs with the artificial tears.    5. Bilateral lower eyelid laxity, trace involutional ectropion  - May be contributing to eye surface symptoms  - Will treat surface as above & defer any surgery until after possible cataract surgery and Dr. Woodard strabismus eval    6. Pseudophakia OS (10/8/20)  - BCVA 20/25 11/18/22    RTC 6 months: DFE, OCT after dilation and K check

## 2023-10-10 ENCOUNTER — OFFICE VISIT (OUTPATIENT)
Dept: GYNECOLOGY | Facility: CLINIC | Age: 78
End: 2023-10-10
Payer: MEDICARE

## 2023-10-10 VITALS
BODY MASS INDEX: 29.23 KG/M2 | RESPIRATION RATE: 20 BRPM | HEART RATE: 68 BPM | WEIGHT: 145 LBS | DIASTOLIC BLOOD PRESSURE: 79 MMHG | HEIGHT: 59 IN | SYSTOLIC BLOOD PRESSURE: 122 MMHG | TEMPERATURE: 98 F | OXYGEN SATURATION: 100 %

## 2023-10-10 DIAGNOSIS — Z01.419 ENCOUNTER FOR ANNUAL ROUTINE GYNECOLOGICAL EXAMINATION: Primary | ICD-10-CM

## 2023-10-10 DIAGNOSIS — Z12.31 VISIT FOR SCREENING MAMMOGRAM: ICD-10-CM

## 2023-10-10 PROCEDURE — G0101 PR CA SCREEN;PELVIC/BREAST EXAM: ICD-10-PCS | Mod: S$PBB,GZ,, | Performed by: NURSE PRACTITIONER

## 2023-10-10 PROCEDURE — 99213 OFFICE O/P EST LOW 20 MIN: CPT | Mod: PBBFAC,25 | Performed by: NURSE PRACTITIONER

## 2023-10-10 PROCEDURE — G0101 CA SCREEN;PELVIC/BREAST EXAM: HCPCS | Mod: PBBFAC | Performed by: NURSE PRACTITIONER

## 2023-10-10 PROCEDURE — G0101 CA SCREEN;PELVIC/BREAST EXAM: HCPCS | Mod: S$PBB,GZ,, | Performed by: NURSE PRACTITIONER

## 2023-10-10 NOTE — PROGRESS NOTES
"  Subjective:       Patient ID: Mikala Thompson is a 78 y.o. female.    Chief Complaint:  Gynecologic Exam    History of Present Illness  Pt is  here for annual gyn. Pt is postmenopausal. Denies vaginal bleeding or discharge. Denies urinary complaints and hot flashes. Denies hx of abnormal pap. Pt had pap smears done here , , . All negative for lesion or malignancy. Last pap 2015-NIL and HPV (-). MG-11/10/22-BIRADS 1. Pt denies fly hx of breast, ovarian, uterine or colon cancer. DEXA -osteopenia, currently on Prolia. Pt does not use tobacco. No complaints today.    GYN & OB History  No LMP recorded. Patient is postmenopausal.   Date of Last Pap: 10/14/2015    Review of patient's allergies indicates:  No Known Allergies  Past Medical History:   Diagnosis Date    Amblyopia     Benign paroxysmal vertigo, bilateral     Cataract     Glaucoma     Hyperlipidemia     Hypertension     Hypothyroidism, unspecified     Osteoarthritis     Strabismus      OB History    Para Term  AB Living   4 4           SAB IAB Ectopic Multiple Live Births                  # Outcome Date GA Lbr Paras/2nd Weight Sex Delivery Anes PTL Lv   4 Para            3 Para            2 Para            1 Para                 Review of Systems  Review of Systems    Negative except for pertinent findings for positives per HPI     Objective:    Physical Exam    /79 (BP Location: Right arm, Patient Position: Sitting, BP Method: Medium (Automatic))   Pulse 68   Temp 98 °F (36.7 °C) (Oral)   Resp 20   Ht 4' 11" (1.499 m)   Wt 65.8 kg (145 lb)   SpO2 100%   BMI 29.29 kg/m²   GENERAL: Well-developed female. No acute distress.    SKIN: Normal to inspection, warm and intact.  BREASTS: No rashes or erythema. No masses, lumps, discharge, tenderness.  ABDOMEN: Soft, non tender.  VULVA: General appearance normal; external genitalia with no lesions or erythema.  BLADDER: No tenderness.  VAGINA: Mucosa/vaginal vault pale, no " abnormal discharge or lesions.  CERVIX: pale, parous appearing os, no erythema or abnormal discharge.  BIMANUAL EXAM: reveals a 10 week-sized uterus. The uterus is non tender. Derrek adnexa reveal no tenderness.  PSYCHIATRIC: Patient is oriented to person, place, and time. Mood and affect are normal.    Assessment:       1. Encounter for annual routine gynecological examination    2. Visit for screening mammogram       Plan:   Mikala was seen today for gynecologic exam.    Diagnoses and all orders for this visit:    Encounter for annual routine gynecological examination    Visit for screening mammogram    Pelvic today, pap deferred d/t age over 65 and adequate screening  MG ordered  Follow up in about 1 year (around 10/10/2024) for annual exam.

## 2023-11-13 ENCOUNTER — OFFICE VISIT (OUTPATIENT)
Dept: INTERNAL MEDICINE | Facility: CLINIC | Age: 78
End: 2023-11-13
Payer: MEDICARE

## 2023-11-13 VITALS
RESPIRATION RATE: 18 BRPM | DIASTOLIC BLOOD PRESSURE: 69 MMHG | HEIGHT: 59 IN | TEMPERATURE: 98 F | BODY MASS INDEX: 29.6 KG/M2 | OXYGEN SATURATION: 99 % | WEIGHT: 146.81 LBS | SYSTOLIC BLOOD PRESSURE: 122 MMHG | HEART RATE: 70 BPM

## 2023-11-13 DIAGNOSIS — E78.5 HYPERLIPIDEMIA, UNSPECIFIED HYPERLIPIDEMIA TYPE: ICD-10-CM

## 2023-11-13 DIAGNOSIS — I10 HYPERTENSION, UNSPECIFIED TYPE: ICD-10-CM

## 2023-11-13 DIAGNOSIS — Z00.00 HEALTHCARE MAINTENANCE: Primary | ICD-10-CM

## 2023-11-13 DIAGNOSIS — E55.9 VITAMIN D DEFICIENCY: ICD-10-CM

## 2023-11-13 DIAGNOSIS — E21.0 PRIMARY HYPERPARATHYROIDISM: ICD-10-CM

## 2023-11-13 DIAGNOSIS — E06.3 HASHIMOTO'S THYROIDITIS: ICD-10-CM

## 2023-11-13 DIAGNOSIS — M81.0 OSTEOPOROSIS, UNSPECIFIED OSTEOPOROSIS TYPE, UNSPECIFIED PATHOLOGICAL FRACTURE PRESENCE: ICD-10-CM

## 2023-11-13 PROCEDURE — 99213 OFFICE O/P EST LOW 20 MIN: CPT | Mod: PBBFAC

## 2023-11-13 RX ORDER — PRAVASTATIN SODIUM 40 MG/1
TABLET ORAL
Qty: 90 TABLET | Refills: 1 | Status: SHIPPED | OUTPATIENT
Start: 2023-11-13

## 2023-11-13 RX ORDER — LEVOTHYROXINE SODIUM 137 UG/1
68.5 TABLET ORAL DAILY
Qty: 45 TABLET | Refills: 1 | Status: SHIPPED | OUTPATIENT
Start: 2023-11-13

## 2023-11-13 RX ORDER — LOSARTAN POTASSIUM 50 MG/1
50 TABLET ORAL DAILY
Qty: 90 TABLET | Refills: 2 | Status: SHIPPED | OUTPATIENT
Start: 2023-11-13 | End: 2024-08-09

## 2023-11-13 RX ORDER — AMLODIPINE BESYLATE 5 MG/1
TABLET ORAL
Qty: 90 TABLET | Refills: 1 | Status: SHIPPED | OUTPATIENT
Start: 2023-11-13

## 2023-11-13 NOTE — PROGRESS NOTES
[facility] Visit    Chief Complaint:      Follow-up (Patient states no acute complaints today.)     Subjective:     HPI:  Mikala Thompson is a 78 y.o. female who has a past medical history of Amblyopia, Benign paroxysmal vertigo, bilateral, Cataract, Glaucoma, Hyperlipidemia, Hypertension, Hypothyroidism, unspecified, Osteoarthritis, and Strabismus.  She presents to clinic today for follow-up of chronic medical conditions. No new complaints today. Continues to have her chronic knee pain, she follows PRN with sports medicine.  Sx worse in the cold weather. States she takes ibuprofen intermittently (3 times this past week) which does help some, voltaren gel, lidocaine patches with mixed amounts of relief. She follows with endocrine for hypothyroidism, osteoporosis, primary hyperparathyroidism. For hyperparathyroid she has declined surgical intervention, elected for medical management. Prolia q6months for osteoporosis. Denies any chest pain, abd pain, N/V/D, constipation, SOB, fevers, paresthesias.  Discussed sleep hygiene for insomnia concern.  Does get some relief with melatonin she takes it home.      Past Medical History:   Diagnosis Date    Amblyopia     Benign paroxysmal vertigo, bilateral     Cataract     Glaucoma     Hyperlipidemia     Hypertension     Hypothyroidism, unspecified     Osteoarthritis     Strabismus        Past Surgical History:   Procedure Laterality Date    CATARACT EXTRACTION      EYE SURGERY  10/2020    Cataracts    FRACTURE SURGERY Left 06/2020    Wrist    TUBAL LIGATION  02/1980       Social History     Socioeconomic History    Marital status: Single   Tobacco Use    Smoking status: Never    Smokeless tobacco: Never   Substance and Sexual Activity    Alcohol use: Not Currently    Drug use: Never    Sexual activity: Not Currently     Partners: Male     Birth control/protection: Abstinence, Post-menopausal     Social Determinants of Health     Financial Resource Strain: Low  Risk  (3/9/2023)    Overall Financial Resource Strain (CARDIA)     Difficulty of Paying Living Expenses: Not hard at all   Food Insecurity: Food Insecurity Present (3/9/2023)    Hunger Vital Sign     Worried About Running Out of Food in the Last Year: Sometimes true     Ran Out of Food in the Last Year: Sometimes true   Transportation Needs: No Transportation Needs (3/9/2023)    PRAPARE - Transportation     Lack of Transportation (Medical): No     Lack of Transportation (Non-Medical): No   Physical Activity: Sufficiently Active (3/9/2023)    Exercise Vital Sign     Days of Exercise per Week: 3 days     Minutes of Exercise per Session: 60 min   Stress: No Stress Concern Present (3/9/2023)    [place] [employer] - Occupational Stress Questionnaire     Feeling of Stress : Only a little   Social Connections: Socially Isolated (3/9/2023)    Social Connection and Isolation Panel [NHANES]     Frequency of Communication with Friends and Family: More than three times a week     Frequency of Social Gatherings with Friends and Family: Three times a week     Attends Anglican Services: Never     Active Member of Clubs or Organizations: No     Attends Club or Organization Meetings: Never     Marital Status:    Housing Stability: Unknown (3/9/2023)    Housing Stability Vital Sign     Unable to Pay for Housing in the Last Year: No     Number of Places Lived in the Last Year: 1         Current Outpatient Medications   Medication Instructions    amLODIPine (NORVASC) 5 MG tablet See Instructions, Take 1 tablet by mouth once daily, # 90 tab(s), 7 Refill(s), Pharmacy: [id number], 150, cm, Height/Length Dosing, 02/01/22 10:09:00 CST, 64.75, kg, Weight Dosing, 02/01/22 10:09:00 CST    levothyroxine (SYNTHROID) 137 MCG Tab tablet Take 1/2 (one-half) tablet by mouth once daily    losartan (COZAAR) 50 mg, Oral, Daily    pravastatin (PRAVACHOL) 40 MG tablet See Instructions, Take 1 tablet by mouth once  "daily, # 90 tab(s), 5 Refill(s), Pharmacy: [id number], 150, cm, Height/Length Dosing, 02/01/22 10:09:00 CST, 64.75, kg, Weight Dosing, 02/01/22 10:09:00 CST       Review of Systems  Review of Systems   Constitutional:  Negative for chills and fever.   HENT:  Negative for hearing loss and sore throat.    Eyes:  Negative for photophobia, pain and discharge.   Respiratory:  Negative for cough, shortness of breath and wheezing.    Cardiovascular:  Negative for chest pain and palpitations.   Gastrointestinal:  Negative for abdominal pain, blood in stool, constipation, diarrhea, nausea and vomiting.   Genitourinary:  Negative for dysuria, flank pain and hematuria.   Musculoskeletal:  Positive for joint pain (chronic). Negative for falls, myalgias and neck pain.   Skin:  Negative for itching and rash.   Neurological:  Negative for dizziness, tingling, tremors, sensory change, speech change, focal weakness, seizures, loss of consciousness, weakness and headaches.   Endo/Heme/Allergies:  Negative for polydipsia. Does not bruise/bleed easily.     Answers submitted by the patient for this visit:  Review of Systems Questionnaire (Submitted on 11/12/2023)  activity change: No  unexpected weight change: No  rhinorrhea: No  trouble swallowing: No  visual disturbance: Yes  chest tightness: No  polyuria: No  difficulty urinating: No  menstrual problem: No  joint swelling: Yes  arthralgias: Yes  confusion: No  dysphoric mood: No      Objective:   Last 24 Hour Vital Signs:  Vitals  BP: 122/69  Temp: 97.5 °F (36.4 °C)  Temp Source: Oral  Pulse: 70  Resp: 18  SpO2: 99 %  Height: 4' 11" (149.9 cm)  Weight: 66.6 kg (146 lb 12.8 oz)       Physical Examination:  Vital signs and nursing notes reviewed.  Constitutional: Patient is in NAD. Awake and alert.    Head: Atraumatic. Normocephalic.  Eyes: Conjunctivae nl. No scleral icterus. Strabismus to left eye.   ENT: Mucous membranes are moist. Oropharynx is clear.  Neck: Supple. Full " ROM.    Cardiovascular: Regular rate and rhythm. No murmurs, rubs, or gallops. Distal pulses are 2+ and symmetric .  Pulmonary/Chest: No respiratory distress. Clear to auscultation bilaterally. No wheezing, rales, or rhonchi.  Abdominal: Soft. Non-distended. No TTP. No rebound, guarding, or rigidity.   Musculoskeletal: Moves all extremities. No edema.  Mild area of swelling to just below the medial knee.  Mild tenderness.  Full range of motion of bilateral knees.  No tenderness to the joint lines.  Skin: Warm and dry.  Neurological: Awake and alert. No acute focal neurological deficits are appreciated.        Assessment & Plan:     Hypertension  -well controlled.  -Continue amlodipine 5, losartan 50     Hyperlipidemia  -Cholesterol 210,  on 11/15/22.   -Advised patient to avoid fatty, greasy and fried foods and to incorporate more fruits and vegetables in her diet  -Continue pravastatin 40    Hypothyroidism  - TSH wnl on 7/27/2022 however TPO and thyroglobulin +ve - suggestive of Hashimotos  - On Synthroid 68.5mcg daily  - No new symptoms     primary hyperparathyroidism  -Following up with endocrinology, she deferred surgical management at this time   -Continue monitoring Ca levels, prolia per endo.      Bilateral wrist osteoarthritis/knee osteoarthritis  Suspect pes anserine bursitis.  - Advised against the chronic use of NSAIDs given her slightly depressed renal function  - Follows with sports medicine clinic for steroid injections which provides great relief for pt. pt to f/u PRN. She will let me know if she needs another referral.   - Patient currently not on any NSAIDs     Osteoporosis given fragility fracture  -DEXA scan on 12/22 showed osteopenia in the femoral necks  -T score of -2. Bilateral femoral neck avg density. L1-L4 T= 0.3.   -Alendronate DCed on 7/2021 given GI adverse effects an started on Prolia with q6mts  -  monitor Ca levels  - DEXA 12/12/22 The bilateral total mean femoral bone mineral  density is equal to 0.752 g/cm squared with a T score of -2.0. This is improved from prior in 2020.      Left distal radius fracture s/p ORIF -recovered  -Had a left distal radius fx on 6/30/2020 and underwent ORIF on 7/21/2020  -Continue f/u with Ortho     Glaucoma  Blepharitis/keratoconjunctivitis  Strabismus.   Cataracts  -Continue f/u with Ophthalmology     Traumatic chronic rotatory cuff tear  -MRI upper extremity right side showed subacute appearing and mildly impacted fracture of the humeral neck with extension to the posterolateral humeral head, no gross fragment displacement identified.  Suspected chronic partial-thickness tear of the supraspinatus tendon  -continue following up with ortho clinic.     Stage 2 CKD  -eGFR on 7/2021 was 72, stable  - 3/22 was 70 - stable.  - eGFR > 60 today   Continue renal sparing activities:  -Follow low sodium diet (2 grams a day)  -Control high blood pressure ( goal BP < 130/80, please record BP at home every day and bring log to next office visit)  -Exercise at least 30 minutes a day, 5 days a week.  -Maintain healthy weight.  -Decrease or stop alcohol use  -Do not smoke  -Stay well hydrated  -Receive Pneumovax, Flu, and HBV vaccines as indicated.  -Do not take NSAIDs (Ibuprofen, Naproxen, Aleve, Advil, Toradol, Mobic), may take only Tylenol as needed for pain/headaches.  -Take cholesterol-lowering medications as prescribed (LDL goal <100)      BBPV  -SNHL  -Good resolution of symptoms after Epley maneuver  -Continue follow-up with ENT/audiology appt is later this month.  - Ok to take dramamine or meclizine prn, however, this is not a definitive tx.    Health Maintenance  Colon cancer screening: Normal in 2013. Cologuard ordered.     Lung Cancer screen: not indicated.  Mammogram: continue annual screening. Normal 10/22.  PapSmear: has discussed with GYN, no longer required.   Hep C: non-reactive.  Dexa: UTD. see above note of osteoporosis  Vaccines      Pneumonia: UTD       Annual Flu: UTD      Zoster: declines.      Tdap: 7/25/18    Immunization History   Administered Date(s) Administered    COVID-19 Vaccine 10/25/2021, 04/11/2022    COVID-19, MRNA, LN-S, PF (MODERNA FULL 0.5 ML DOSE) 02/19/2021, 03/19/2021, 10/25/2021    Influenza 11/13/2017    Influenza - High Dose - PF (65 years and older) 11/04/2015, 11/13/2017, 11/12/2018, 11/05/2019    Influenza - Quadrivalent 12/14/2016    Influenza - Quadrivalent - High Dose - PF (65 years and older) 10/21/2020, 10/04/2021, 10/11/2022    Pneumococcal 11/04/2015    Pneumococcal Conjugate - 13 Valent 07/18/2019    Pneumococcal Polysaccharide - 23 Valent 11/04/2015    Tdap 07/25/2018, 07/25/2018           Follow-ups  -Follow-up medicine clinic in 4 months  - Optho   - ENT For BBPV, Annual audio  - endo   - GYN - pap deferred 2/2 age and adequate screenings.       Nahid Lazcano DO  [facility] Internal Medicine, HO-2

## 2023-11-28 ENCOUNTER — OFFICE VISIT (OUTPATIENT)
Dept: OTOLARYNGOLOGY | Facility: CLINIC | Age: 78
End: 2023-11-28
Payer: MEDICARE

## 2023-11-28 VITALS — HEART RATE: 68 BPM | DIASTOLIC BLOOD PRESSURE: 72 MMHG | TEMPERATURE: 98 F | SYSTOLIC BLOOD PRESSURE: 112 MMHG

## 2023-11-28 DIAGNOSIS — R42 DIZZINESS: ICD-10-CM

## 2023-11-28 DIAGNOSIS — H90.3 SENSORINEURAL HEARING LOSS (SNHL) OF BOTH EARS: Primary | ICD-10-CM

## 2023-11-28 PROCEDURE — 1126F AMNT PAIN NOTED NONE PRSNT: CPT | Mod: CPTII,,, | Performed by: NURSE PRACTITIONER

## 2023-11-28 PROCEDURE — 3074F SYST BP LT 130 MM HG: CPT | Mod: CPTII,,, | Performed by: NURSE PRACTITIONER

## 2023-11-28 PROCEDURE — 1101F PT FALLS ASSESS-DOCD LE1/YR: CPT | Mod: CPTII,,, | Performed by: NURSE PRACTITIONER

## 2023-11-28 PROCEDURE — 99213 OFFICE O/P EST LOW 20 MIN: CPT | Mod: PBBFAC | Performed by: NURSE PRACTITIONER

## 2023-11-28 PROCEDURE — 99213 PR OFFICE/OUTPT VISIT, EST, LEVL III, 20-29 MIN: ICD-10-PCS | Mod: S$PBB,,, | Performed by: NURSE PRACTITIONER

## 2023-11-28 PROCEDURE — 1159F MED LIST DOCD IN RCRD: CPT | Mod: CPTII,,, | Performed by: NURSE PRACTITIONER

## 2023-11-28 PROCEDURE — 1159F PR MEDICATION LIST DOCUMENTED IN MEDICAL RECORD: ICD-10-PCS | Mod: CPTII,,, | Performed by: NURSE PRACTITIONER

## 2023-11-28 PROCEDURE — 1101F PR PT FALLS ASSESS DOC 0-1 FALLS W/OUT INJ PAST YR: ICD-10-PCS | Mod: CPTII,,, | Performed by: NURSE PRACTITIONER

## 2023-11-28 PROCEDURE — 3074F PR MOST RECENT SYSTOLIC BLOOD PRESSURE < 130 MM HG: ICD-10-PCS | Mod: CPTII,,, | Performed by: NURSE PRACTITIONER

## 2023-11-28 PROCEDURE — 3078F DIAST BP <80 MM HG: CPT | Mod: CPTII,,, | Performed by: NURSE PRACTITIONER

## 2023-11-28 PROCEDURE — 3288F FALL RISK ASSESSMENT DOCD: CPT | Mod: CPTII,,, | Performed by: NURSE PRACTITIONER

## 2023-11-28 PROCEDURE — 3288F PR FALLS RISK ASSESSMENT DOCUMENTED: ICD-10-PCS | Mod: CPTII,,, | Performed by: NURSE PRACTITIONER

## 2023-11-28 PROCEDURE — 1126F PR PAIN SEVERITY QUANTIFIED, NO PAIN PRESENT: ICD-10-PCS | Mod: CPTII,,, | Performed by: NURSE PRACTITIONER

## 2023-11-28 PROCEDURE — 3078F PR MOST RECENT DIASTOLIC BLOOD PRESSURE < 80 MM HG: ICD-10-PCS | Mod: CPTII,,, | Performed by: NURSE PRACTITIONER

## 2023-11-28 PROCEDURE — 99213 OFFICE O/P EST LOW 20 MIN: CPT | Mod: S$PBB,,, | Performed by: NURSE PRACTITIONER

## 2023-11-28 NOTE — PROGRESS NOTES
[facility]  Otolaryngology Clinic Note    Mikala Thompson  Encounter Date: 10/11/2022  YOB: 1945    Chief Complaint: dizziness    HPI: 4/12/21: 75yoF referred for dizziness. Is having dizzy episodes ~1-2x/day which began 1/2021. Describes as feeling is as if balance is off and lightheaded; is unsure what exacerbates this. Also has occasional brief room spinning when she turns over in the bed. Episodes last for a few seconds. Reports having normal orthostatic VS at last PCP visit. Denies appreciable HL or tinnitus. Denies medication change. Endorses falling on either side of her body in June of 2020 and January of 2021.    5/26/21: Reports dizziness has been greatly improved since Epley and with home maneuvers. Requesting to have her ears cleaned. She uses qtips to clean them at home.    6/17/21: Denies otorrhea or otalgia. Continues to do well from a dizziness standpoint.    10/11/2022: Reports she began having vertiginous dizziness again 4-6 weeks ago which is similar to her previous BPPV. She has not had any falls or medication changes. She is wondering if it is because she has ear wax buildup.     11/22/22: States that dizziness had improved following Epley, however, she had a bad episode again last week. She reports resolution following home Epley demonstrated by audiology, but she is very concerned and anxious about it coming back.    5/23/23: Reports continued dizziness, occurring a few times weekly. Feels spinning to lightheaded, similar to before but not as severe. Episodes do not last long. She is unable to say if there are exacerbating factors. Right ear canal has been itchy & she feels that it needs to be cleaned.    11/28/23: States she had been doing very well since last visit, however, she has had a couple of transient episodes of lightheadedness which began 2 days ago. This is not exacerbated by movement. She is wondering if it could be r/t her vision. She has a  left eye surgery scheduled in January & plans to get new glasses following surgery.     ROS:   10-point review of systems negative except per HPI      Review of patient's allergies indicates:  No Known Allergies    Past Medical History:   Diagnosis Date    Amblyopia     Cataract     Glaucoma     Hyperlipidemia     Hypertension     Osteoarthritis     Strabismus        Past Surgical History:   Procedure Laterality Date    CATARACT EXTRACTION         Social History     Socioeconomic History    Marital status: Single   Tobacco Use    Smoking status: Never    Smokeless tobacco: Never   Substance and Sexual Activity    Alcohol use: Never    Drug use: Never    Sexual activity: Not Currently     Partners: Male     Birth control/protection: Post-menopausal       Family History   Problem Relation Age of Onset    Cancer Maternal Uncle     Diabetes Maternal Grandmother        Outpatient Encounter Medications as of 10/11/2022   Medication Sig Dispense Refill    amLODIPine (NORVASC) 5 MG tablet Take 5 mg by mouth once daily.      amLODIPine (NORVASC) 5 MG tablet   See Instructions, Take 1 tablet by mouth once daily, # 90 tab(s), 7 Refill(s), Pharmacy: [id number], 150, cm, Height/Length Dosing, 02/01/22 10:09:00 CST, 64.75, kg, Weight Dosing, 02/01/22 10:09:00 CST      cycloSPORINE (RESTASIS) 0.05 % ophthalmic emulsion Place 1 drop into both eyes 2 (two) times daily. 30 each 2    diclofenac (VOLTAREN) 75 MG EC tablet       levothyroxine (SYNTHROID) 137 MCG Tab tablet Take 1/2 (one-half) tablet by mouth once daily 45 tablet 2    losartan (COZAAR) 50 MG tablet       pravastatin (PRAVACHOL) 40 MG tablet   See Instructions, Take 1 tablet by mouth once daily, # 90 tab(s), 5 Refill(s), Pharmacy: [id number], 150, cm, Height/Length Dosing, 02/01/22 10:09:00 CST, 64.75, kg, Weight Dosing, 02/01/22 10:09:00 CST       No facility-administered encounter medications on file as of 10/11/2022.       Physical Exam:  Vitals:  "   10/11/22 0858   BP: 113/77   BP Location: Right arm   Patient Position: Sitting   BP Method: Medium (Automatic)   Pulse: 80   Temp: 97.8 °F (36.6 °C)   TempSrc: Oral   Weight: 64.1 kg (141 lb 6.4 oz)   Height: 4' 11" (1.499 m)     General: NAD, voice normal  Neuro: AAO, CN II - XII grossly intact  Head/ Face: NCAT, symmetric, sensations intact bilaterally  Eyes: EOMI, PERRL  Ears: externally normal with grossly normal hearing  AD: EAC patent. TM intact w/o effusion or retraction  AS: EAC patent. TM intact w/o effusion or retraction  Nose: bilateral nares patent, midline septum, no rhinorrhea, no external deformity, no turbinate hypertrophy  OC/OP: MMM, no intraoral lesions, no trismus, dentition is poor, no uvular deviation, bilaterally symmetric soft palate elevation, palatoglossus and palatopharyngeal fold wnl; tonsils are symmetric and 1+  Indirect laryngoscopy: deferred due to patient intolerance  Neck: soft, supple, no LAD, normal ROM, no thyromegaly  Respiratory: nonlabored, no wheezing, bilateral chest rise  Cardiovascular: RRR  Gastrointestinal: S NT ND  Skin: warm, no lesions  Musculoskeletal: 5/5 strength  Psych: Appropriate affect/mood     Audio:         Assessment/Plan:  77 y.o. female with hx of recurrent right BPPV. Audio with moderate SNHL of high frequencies b/l. Recent lightheaded episodes not exacerbated by movement. Will plan to monitor.   - Jax [place] exercises provided  - Safety precautions  - Annual audio   - RTC 3-4mo       Annalisa Mack NP    >30min spent on visit      "

## 2023-12-13 ENCOUNTER — CLINICAL SUPPORT (OUTPATIENT)
Dept: AUDIOLOGY | Facility: HOSPITAL | Age: 78
End: 2023-12-13
Payer: MEDICAID

## 2023-12-13 DIAGNOSIS — R42 DIZZINESS: ICD-10-CM

## 2023-12-13 DIAGNOSIS — H90.3 SENSORINEURAL HEARING LOSS (SNHL) OF BOTH EARS: ICD-10-CM

## 2023-12-13 PROCEDURE — 92557 COMPREHENSIVE HEARING TEST: CPT | Performed by: AUDIOLOGIST-HEARING AID FITTER

## 2023-12-13 PROCEDURE — 92567 TYMPANOMETRY: CPT | Performed by: AUDIOLOGIST-HEARING AID FITTER

## 2023-12-13 NOTE — PROGRESS NOTES
Audiological Evaluation    Patient History:    Patient evaluated today to assess hearing.  She reportedly does not perceive any changes in hearing since the previous evaluation in 2022.   Tinnitus, otalgia, otorrhea and a history of middle ear involvement/otologic procedures have been denied at this time.  Mrs. Thompson also endorses she has not experienced any vertiginous symptoms related to BPPV in quite some time.  Patient's medical history has reportedly not changed since most recent medical evaluation.       Pure Tone Testing:    Right ear:        Normal to moderate, SNHL    Left ear:          Normal to moderate, SNHL        Tympanometry:      Right ear:   Type 'A' tympanogram    Left ear: Type 'A' tympanogram           Acoustic Reflex Testing    Right ear:   Did not test     Left ear: Did not test        Interpretations:    Pure tone testing revealed normal to moderate, sensorineural hearing loss, bilaterally. Speech reception thresholds were obtained at 20 dB HL consistent with pure tone results, bilaterally.  Word recognition scores were excellent, bilaterally.  Immittance testing revealed Type A tympanograms, bilaterally, indicative of normal middle ear function. Otoscopy revealed clear EACs, bilaterally.      Recommendations:     Audiological testing annually  ENT evaluation per ENT  Hearing protection when exposed to hazardous noise  Repeat Epley maneuver under google array ONLY should symptoms return    Lucy Sifuentes.  Clinical Audiologist

## 2024-01-19 ENCOUNTER — OFFICE VISIT (OUTPATIENT)
Dept: INTERNAL MEDICINE | Facility: CLINIC | Age: 79
End: 2024-01-19
Payer: MEDICARE

## 2024-01-19 ENCOUNTER — TELEPHONE (OUTPATIENT)
Dept: INTERNAL MEDICINE | Facility: CLINIC | Age: 79
End: 2024-01-19

## 2024-01-19 VITALS
WEIGHT: 146.63 LBS | SYSTOLIC BLOOD PRESSURE: 107 MMHG | TEMPERATURE: 98 F | RESPIRATION RATE: 18 BRPM | HEART RATE: 81 BPM | OXYGEN SATURATION: 97 % | BODY MASS INDEX: 29.61 KG/M2 | DIASTOLIC BLOOD PRESSURE: 74 MMHG

## 2024-01-19 DIAGNOSIS — Z01.818 PRE-OP EVALUATION: Primary | ICD-10-CM

## 2024-01-19 PROCEDURE — 99213 OFFICE O/P EST LOW 20 MIN: CPT | Mod: PBBFAC

## 2024-01-19 NOTE — PROGRESS NOTES
[facility] INTERNAL MEDICINE  OUTPATIENT OFFICE VISIT NOTE    SUBJECTIVE:      Chief Complaint: Follow-up (Surgery Clearance- Laura eye- left)       HPI: Mikala Thompson is a 78 y.o. yo female w/ PMH of  has a past medical history of Amblyopia, Benign paroxysmal vertigo, bilateral, Cataract, Glaucoma, Hyperlipidemia, Hypertension, Hypothyroidism, Hypothyroidism, unspecified, Osteoarthritis, and Strabismus., who presents for risk stratification for eye surgery with Dr. oWodard in [place]. Patient will be getting surgery for her strabismus.       Past Medical History:   has a past medical history of Amblyopia, Benign paroxysmal vertigo, bilateral, Cataract, Glaucoma, Hyperlipidemia, Hypertension, Hypothyroidism, Hypothyroidism, unspecified, Osteoarthritis, and Strabismus.     Past Surgical History:   has a past surgical history that includes Cataract extraction; Eye surgery (10/2020); Fracture surgery (Left, 06/2020); and Tubal ligation (02/1980).     Family History:  family history includes Cancer in her maternal uncle; Diabetes in her maternal grandmother.     Social History:   reports that she has never smoked. She has never used smokeless tobacco. She reports that she does not currently use alcohol. She reports that she does not use drugs.     Allergies:  has No Known Allergies.     Home Medications:  Current Outpatient Medications   Medication Instructions    amLODIPine (NORVASC) 5 MG tablet See Instructions, Take 1 tablet by mouth once daily, # 90 tab(s), 7 Refill(s), Pharmacy: [id number], 150, cm, Height/Length Dosing, 02/01/22 10:09:00 CST, 64.75, kg, Weight Dosing, 02/01/22 10:09:00 CST    levothyroxine (SYNTHROID) 68.5 mcg, Oral, Daily    losartan (COZAAR) 50 mg, Oral, Daily    pravastatin (PRAVACHOL) 40 MG tablet See Instructions, Take 1 tablet by mouth once daily, # 90 tab(s), 5 Refill(s), Pharmacy: [id number], 150, cm, Height/Length Dosing, 02/01/22 10:09:00 CST, 64.75, kg, Weight  Dosing, 02/01/22 10:09:00 CST        ROS:  Negative for all symptoms other than those listed in HPI         OBJECTIVE:     Vital signs:   /74 (BP Location: Left arm, Patient Position: Sitting, BP Method: Medium (Automatic))   Pulse 81   Temp 98.1 °F (36.7 °C) (Oral)   Resp 18   Wt 66.5 kg (146 lb 9.6 oz)   SpO2 97%   BMI 29.61 kg/m²      Physical Examination:  Physical Exam  Eyes:      Comments: strabismus   Cardiovascular:      Rate and Rhythm: Normal rate and regular rhythm.      Pulses: Normal pulses.      Heart sounds: Normal heart sounds. No murmur heard.     No friction rub. No gallop.   Pulmonary:      Effort: Pulmonary effort is normal. No respiratory distress.      Breath sounds: Normal breath sounds. No stridor. No wheezing or rhonchi.   Abdominal:      General: Abdomen is flat. Bowel sounds are normal.      Palpations: Abdomen is soft.   Skin:     General: Skin is warm and dry.   Neurological:      General: No focal deficit present.           Labs:  BMP:   Lab Results   Component Value Date    CHLORIDE 110 (H) 11/13/2023    CO2 23 11/13/2023    BUN 17.2 11/13/2023    CREATININE 0.86 11/13/2023    GLUCOSE 84 11/13/2023    CALCIUM 9.9 11/13/2023     CBC:   Lab Results   Component Value Date    WBC 7.47 11/13/2023    HGB 13.1 11/13/2023    HCT 39.8 11/13/2023    MCV 93.6 11/13/2023    RDW 13.0 11/13/2023     LFTs:   Lab Results   Component Value Date    LABPROT 7.6 11/13/2023    ALBUMIN 3.7 11/13/2023    AST 13 11/13/2023    ALT 10 11/13/2023    ALKPHOS 71 11/13/2023     FLP:   Lab Results   Component Value Date    CHOL 201 (H) 11/13/2023    HDL 68 (H) 11/13/2023    .00 11/13/2023    TRIG 80 11/13/2023    TOTALCHOLEST 3 11/13/2023     DM:   Lab Results   Component Value Date    HGBA1C 5.2 09/10/2020     09/10/2020    CREATININE 0.86 11/13/2023    CREATRANDUR 108.5 (H) 04/20/2021     Thyroid:   Lab Results   Component Value Date    TSH 1.293 03/09/2023    CHAVPA3EEAS 1.25 07/21/2021  "    Anemia: No results found for: "IRON", "TIBC", "FERRITIN", "[id number]", "FOLATE"            ASSESSMENT & PLAN:        Risk Stratificiation  - Patient has PMHx of HTN which is well controlled with BP today of 107/74. P 81. Patients BMI is 29.61, eGFR >60 and age is >70.  Physical exam is unremarkable. She is at intermediate risk for low risk procedure.            Joshua Chapa M.D  [facility] PGY-1             "

## 2024-01-19 NOTE — TELEPHONE ENCOUNTER
Pt present for surgery clearance today. Please review office notes from today's visit for information regarding this matter. Thanks

## 2024-02-20 ENCOUNTER — TELEPHONE (OUTPATIENT)
Dept: OPHTHALMOLOGY | Facility: CLINIC | Age: 79
End: 2024-02-20
Payer: MEDICARE

## 2024-02-20 DIAGNOSIS — H50.00 ESOTROPIA: Primary | ICD-10-CM

## 2024-02-20 NOTE — TELEPHONE ENCOUNTER
Called pediatric opthalmology clinic of Dr. Kike Woodard to f/u on external referral that was sent on 08/31/23.    Spoke with Kim. She explained that Dr. Woodard does not see adult patients at the pediatric clinic and she provided the office numbers of two locations that he does accept adult patients.  [facility]   [place] [id number]      Completed a new referral and faxed to Dr. Woodard at the ophthalmology clinic for adult strabismus in [place].

## 2024-02-29 ENCOUNTER — OFFICE VISIT (OUTPATIENT)
Dept: OPHTHALMOLOGY | Facility: CLINIC | Age: 79
End: 2024-02-29
Payer: MEDICARE

## 2024-02-29 VITALS — BODY MASS INDEX: 29.43 KG/M2 | HEIGHT: 59 IN | WEIGHT: 146 LBS

## 2024-02-29 DIAGNOSIS — H04.123 DRY EYE SYNDROME OF BOTH EYES: ICD-10-CM

## 2024-02-29 DIAGNOSIS — H25.811 COMBINED FORMS OF AGE-RELATED CATARACT OF RIGHT EYE: ICD-10-CM

## 2024-02-29 DIAGNOSIS — H40.019 OPEN ANGLE GLAUCOMA SUSPECT WITH BORDERLINE FINDINGS AT LOW RISK: Primary | ICD-10-CM

## 2024-02-29 DIAGNOSIS — H40.013 AT LOW RISK FOR OPEN-ANGLE GLAUCOMA IN BOTH EYES: ICD-10-CM

## 2024-02-29 PROCEDURE — 92133 CPTRZD OPH DX IMG PST SGM ON: CPT | Mod: PBBFAC,PN | Performed by: STUDENT IN AN ORGANIZED HEALTH CARE EDUCATION/TRAINING PROGRAM

## 2024-02-29 PROCEDURE — 99213 OFFICE O/P EST LOW 20 MIN: CPT | Mod: PBBFAC,PN | Performed by: STUDENT IN AN ORGANIZED HEALTH CARE EDUCATION/TRAINING PROGRAM

## 2024-02-29 PROCEDURE — 92133 CPTRZD OPH DX IMG PST SGM ON: CPT | Mod: PBBFAC,PN | Performed by: OPHTHALMOLOGY

## 2024-02-29 RX ORDER — PHENYLEPH/TROPICAMIDE IN WATER 2.5 %-1 %
1 DROPS OPHTHALMIC (EYE) ONCE
Status: COMPLETED | OUTPATIENT
Start: 2024-02-29 | End: 2024-02-29

## 2024-02-29 RX ADMIN — Medication 1 DROP: at 08:02

## 2024-02-29 NOTE — PROGRESS NOTES
HPI     Esotropia     Additional comments: Dr. Kike Woodard - external referral   Strabismus sx 01/26/24 Patient states she's seeing a shadow on the outer   part of left eye, for about a week            Comments    6 months f/u DFE, OCT after dilation and K check          Last edited by Lorena Joseph LPN on 2/29/2024  8:32 AM.            Assessment /Plan     For exam results, see Encounter Report.    Open angle glaucoma suspect with borderline findings at low risk  -     tropicamide /PHENYLephrine opthalmic solution 1 drop    Dry eye syndrome of both eyes    Combined forms of age-related cataract of right eye                     Ancillary Testing    OCTrnfl    2/29/24: OD 81 thinning IN (unreliable unable to find cup), 78 all green   8/31/2023: OD: thinning nasally, unreliable due to difficulty finding the cup.  OS: unreliable due to blockage superiorly    HVF 24-2  - 7/19/22  OD: 2/10 FL 0% FP 0% FN, nonspecific scattered deficits  OS: 1/10 FL 4% FP 0% FN, full field  - 11/18/22  OD: FL 0/11, FP 5%, FN 0%, few central defects  OS: FL 9/11, FP 0%, FN 7%, enlarged blind spot/dense defect temporally    Assessment /Plan     For exam results, see Encounter Report.         1. Myopia OU with posterior staphyloma OS, esotropia alternating  - Patient with long standing myopia. Does have hx of thyroid disease but not c/w restrictive disease.   - On further questioning she reports her left eye has always been weaker than her right eye. Esotropia started after cataract surgery OS.   - ~25PD ET in primary gaze stable. Alternating  - s/p strab surgery with estela 1/26/24  - 2/29/24 doing well, has fu visits in next few weeks, Will cont to follow       2. Combined form of age-related cataract, OD  - BCVA 20/40  - likely VS on exam, will bring back for dfe mrx cat eval OD in 2-3 months  - seeing pcp in next couple weeks    3. Open angle with borderline glaucoma findings, low risk, both eyes- resolved  - Based on CDR asymmetry  -  CCT thin OS>OD  - Gonio open to CBB  - OCT RNFL 1/25/22: IN thinning OD, full OS  2/292/24 81 IN thinning, 78 all green   - HVF   7/2022 essentially full with nonspecific deficits OD (does not correlate with OCT thinning)  11/2022: full with nonspecific central defects right eye // left eye unreliable due to high FL  - Given IOP mid-low teens and tilted disc likely obscuring OCT findings, continue to monitor off drops.  -2/29/24: still poor scan in OD due to tilting, OS full octn. Iop remains wnl. Very small cups ou, Will stop OCTN/VF as this time, no suspicion for glaucoma     4. Blepharitis and Keratoconjunctivitis sicca OU  - With complaint of burning most hours of the day  - Did not notice improvement with lotemax, restasis, xiidra, BCL, Avenova, punctal plugs  - Has punctal plug in place OS, absent OD  - Lab work: SSA, SSB, YAW, RF, ACE/lysozyme, scleroderma, Anti-smith, CRP/ESR all within normal limits  - Current regimen: PFATs q3 hours, WC & LS 1-2 times/day, genteal   -2/29/24 no dryness on exam today     5. Bilateral lower eyelid laxity, trace involutional ectropion  - May be contributing to eye surface symptoms  - Will treat surface as above & defer any surgery until after possible cataract surgery and Dr. Woodard strabismus eval    6. Pseudophakia OS (10/8/20)  7 pco OS   - BCVA 20/25 11/18/22  - 2/29/24: starting to see temporal crescent in OS, with new ealry pco temporally, dfe otherwise wnl. RD precautions discussed but would hold off on yag at this time     RTC 2-3 months dfe possible cat eval OD (repeat calcs as other eye done 2020)

## 2024-03-15 ENCOUNTER — OFFICE VISIT (OUTPATIENT)
Dept: ENDOCRINOLOGY | Facility: CLINIC | Age: 79
End: 2024-03-15
Payer: MEDICARE

## 2024-03-15 ENCOUNTER — LAB VISIT (OUTPATIENT)
Dept: LAB | Facility: HOSPITAL | Age: 79
End: 2024-03-15
Attending: STUDENT IN AN ORGANIZED HEALTH CARE EDUCATION/TRAINING PROGRAM
Payer: MEDICARE

## 2024-03-15 ENCOUNTER — TELEPHONE (OUTPATIENT)
Dept: ENDOCRINOLOGY | Facility: CLINIC | Age: 79
End: 2024-03-15

## 2024-03-15 VITALS
RESPIRATION RATE: 16 BRPM | HEART RATE: 68 BPM | SYSTOLIC BLOOD PRESSURE: 123 MMHG | WEIGHT: 146 LBS | BODY MASS INDEX: 29.43 KG/M2 | DIASTOLIC BLOOD PRESSURE: 76 MMHG | TEMPERATURE: 98 F | HEIGHT: 59 IN

## 2024-03-15 DIAGNOSIS — E21.0 PRIMARY HYPERPARATHYROIDISM: ICD-10-CM

## 2024-03-15 DIAGNOSIS — M81.0 OSTEOPOROSIS, UNSPECIFIED OSTEOPOROSIS TYPE, UNSPECIFIED PATHOLOGICAL FRACTURE PRESENCE: Primary | ICD-10-CM

## 2024-03-15 DIAGNOSIS — E06.3 HASHIMOTO'S THYROIDITIS: ICD-10-CM

## 2024-03-15 DIAGNOSIS — E55.9 VITAMIN D DEFICIENCY: ICD-10-CM

## 2024-03-15 DIAGNOSIS — M81.0 OSTEOPOROSIS, UNSPECIFIED OSTEOPOROSIS TYPE, UNSPECIFIED PATHOLOGICAL FRACTURE PRESENCE: ICD-10-CM

## 2024-03-15 DIAGNOSIS — E06.3 HASHIMOTO'S THYROIDITIS: Primary | ICD-10-CM

## 2024-03-15 LAB
ALBUMIN SERPL-MCNC: 3.7 G/DL (ref 3.4–4.8)
BUN SERPL-MCNC: 24.5 MG/DL (ref 9.8–20.1)
CALCIUM SERPL-MCNC: 10.9 MG/DL (ref 8.4–10.2)
CHLORIDE SERPL-SCNC: 108 MMOL/L (ref 98–107)
CO2 SERPL-SCNC: 25 MMOL/L (ref 23–31)
CREAT SERPL-MCNC: 0.92 MG/DL (ref 0.55–1.02)
DEPRECATED CALCIDIOL+CALCIFEROL SERPL-MC: 32.5 NG/ML (ref 30–80)
GFR SERPLBLD CREATININE-BSD FMLA CKD-EPI: >60 MLS/MIN/1.73/M2
GLUCOSE SERPL-MCNC: 78 MG/DL (ref 82–115)
PHOSPHATE SERPL-MCNC: 2.5 MG/DL (ref 2.3–4.7)
POTASSIUM SERPL-SCNC: 4.3 MMOL/L (ref 3.5–5.1)
SODIUM SERPL-SCNC: 140 MMOL/L (ref 136–145)
TSH SERPL-ACNC: 2.18 UIU/ML (ref 0.35–4.94)

## 2024-03-15 PROCEDURE — 96372 THER/PROPH/DIAG INJ SC/IM: CPT | Mod: PBBFAC

## 2024-03-15 PROCEDURE — 80069 RENAL FUNCTION PANEL: CPT

## 2024-03-15 PROCEDURE — 84443 ASSAY THYROID STIM HORMONE: CPT

## 2024-03-15 PROCEDURE — 82306 VITAMIN D 25 HYDROXY: CPT

## 2024-03-15 PROCEDURE — 36415 COLL VENOUS BLD VENIPUNCTURE: CPT

## 2024-03-15 PROCEDURE — 99213 OFFICE O/P EST LOW 20 MIN: CPT | Mod: PBBFAC

## 2024-03-15 RX ADMIN — DENOSUMAB 60 MG: 60 INJECTION SUBCUTANEOUS at 09:03

## 2024-03-15 NOTE — TELEPHONE ENCOUNTER
Labs were wnl, continue with plan per recent visit including prolia.  Regroup prior to f/u in a year w/ labs including renal panel, vit d, TSH.

## 2024-03-15 NOTE — PROGRESS NOTES
Endocrinology Clinic Note    Patient Name: Mikala Thompson  LLUVIAB: 1945   MRN: [id number]  Date: 03/15/2024  Home Address: [address] [place] [id number]      Subjective:     Chief Complaint:   Chief Complaint   Patient presents with    Osteoporosis       HPI:  The patient is a 78 y.o. year old female with hx of Primary hyperparathyroidism, Hypercalcemia, Osteoporosis, and Hashimoto's Thyroiditis who presents to endocrinology clinic for scheduled follow-up.  Patient states she has been doing well since her last visit.  She continues to take levothyroxine as prescribed in morning when she 1st wakes up, TSH has been within normal limits over the last 2 years.  Patient now receives Prolia shot every 6 months, she states she is tolerating this well.  No new fractures, continues to take over-the-counter vitamin-D supplementation and vitamin-D levels have been within normal range.  DEXA scan is up-to-date.  Patient still states she would like to defer any conversations for surgery and continue to medically manage for now.  No other acute concerns at this time.    Available notes and lab results since last visit were reviewed.    Past Medical History:   Diagnosis Date    Amblyopia     Benign paroxysmal vertigo, bilateral     Cataract     Glaucoma     Hyperlipidemia     Hypertension     Hypothyroidism     Hypothyroidism, unspecified     Osteoarthritis     Strabismus       Past Surgical History:   Procedure Laterality Date    CATARACT EXTRACTION      EYE MUSCLE SURGERY Left 01/26/2024    strabismus sx OS - Dr. Kike Woodard    EYE SURGERY  10/2020    Cataracts    FRACTURE SURGERY Left 06/2020    Wrist    TUBAL LIGATION  02/1980   Allergy:  Review of patient's allergies indicates:  No Known Allergies     Current Medications:    Current Outpatient Medications:     amLODIPine (NORVASC) 5 MG tablet, See Instructions, Take 1 tablet by mouth once daily, # 90 tab(s), 7 Refill(s), Pharmacy: [facility]  [id number], 150, cm, Height/Length Dosing, 02/01/22 10:09:00 CST, 64.75, kg, Weight Dosing, 02/01/22 10:09:00 CST, Disp: 90 tablet, Rfl: 1    levothyroxine (SYNTHROID) 137 MCG Tab tablet, Take 0.5 tablets (68.5 mcg total) by mouth once daily., Disp: 45 tablet, Rfl: 1    losartan (COZAAR) 50 MG tablet, Take 1 tablet (50 mg total) by mouth once daily., Disp: 90 tablet, Rfl: 2    pravastatin (PRAVACHOL) 40 MG tablet, See Instructions, Take 1 tablet by mouth once daily, # 90 tab(s), 5 Refill(s), Pharmacy: [id number], 150, cm, Height/Length Dosing, 02/01/22 10:09:00 CST, 64.75, kg, Weight Dosing, 02/01/22 10:09:00 CST, Disp: 90 tablet, Rfl: 1     Social History:   reports that she has never smoked. She has never used smokeless tobacco. She reports that she does not currently use alcohol. She reports that she does not use drugs.     Family History   Problem Relation Age of Onset    Cancer Maternal Uncle     Diabetes Maternal Grandmother       Immunization History   Administered Date(s) Administered    COVID-19 MRNA, LN-S PF (MODERNA HALF 0.25 ML DOSE) 10/25/2021    COVID-19 Vaccine 10/25/2021, 04/11/2022    COVID-19, MRNA, LN-S, PF (MODERNA FULL 0.5 ML DOSE) 02/19/2021, 03/19/2021    Influenza 11/13/2017    Influenza - High Dose - PF (65 years and older) 11/04/2015, 11/13/2017, 11/12/2018, 11/05/2019    Influenza - Quadrivalent 12/14/2016    Influenza - Quadrivalent - High Dose - PF (65 years and older) 10/21/2020, 10/04/2021, 10/11/2022    Pneumococcal 11/04/2015    Pneumococcal Conjugate - 13 Valent 07/18/2019    Pneumococcal Polysaccharide - 23 Valent 11/04/2015    Tdap 07/25/2018, 07/25/2018       Review of Systems   Constitutional:  Negative for chills and fever.   Respiratory:  Negative for shortness of breath.    Cardiovascular:  Negative for chest pain.   Musculoskeletal:  Negative for back pain and neck pain.   Neurological:  Negative for tremors and weakness.       Objective:      Physical Exam  Vitals:     "03/15/24 0832   BP: 123/76   Pulse: 68   Resp: 16   Temp: 97.5 °F (36.4 °C)   TempSrc: Oral   Weight: 66.2 kg (146 lb)   Height: 4' 11" (1.499 m)        Wt Readings from Last 3 Encounters:   03/15/24 66.2 kg (146 lb)   02/29/24 66.2 kg (146 lb)   01/19/24 66.5 kg (146 lb 9.6 oz)       Physical Exam  Vitals and nursing note reviewed.   Constitutional:       General: She is not in acute distress.     Appearance: Normal appearance. She is normal weight.   Cardiovascular:      Rate and Rhythm: Normal rate and regular rhythm.      Pulses: Normal pulses.      Heart sounds: No murmur heard.  Pulmonary:      Effort: Pulmonary effort is normal. No respiratory distress.   Skin:     General: Skin is warm and dry.   Neurological:      General: No focal deficit present.      Mental Status: She is alert and oriented to person, place, and time.        Body mass index is 29.49 kg/m².    Lab Visit on 11/13/2023   Component Date Value Ref Range Status    Sodium Level 11/13/2023 140  136 - 145 mmol/L Final    Potassium Level 11/13/2023 4.2  3.5 - 5.1 mmol/L Final    Chloride 11/13/2023 110 (H)  98 - 107 mmol/L Final    Carbon Dioxide 11/13/2023 23  23 - 31 mmol/L Final    Glucose Level 11/13/2023 84  82 - 115 mg/dL Final    Blood Urea Nitrogen 11/13/2023 17.2  9.8 - 20.1 mg/dL Final    Creatinine 11/13/2023 0.86  0.55 - 1.02 mg/dL Final    Calcium Level Total 11/13/2023 9.9  8.4 - 10.2 mg/dL Final    Protein Total 11/13/2023 7.6  5.8 - 7.6 gm/dL Final    Albumin Level 11/13/2023 3.7  3.4 - 4.8 g/dL Final    Globulin 11/13/2023 3.9 (H)  2.4 - 3.5 gm/dL Final    Albumin/Globulin Ratio 11/13/2023 0.9 (L)  1.1 - 2.0 ratio Final    Bilirubin Total 11/13/2023 0.5  <=1.5 mg/dL Final    Alkaline Phosphatase 11/13/2023 71  40 - 150 unit/L Final    Alanine Aminotransferase 11/13/2023 10  0 - 55 unit/L Final    Aspartate Aminotransferase 11/13/2023 13  5 - 34 unit/L Final    eGFR 11/13/2023 >60  mls/min/1.73/m2 Final    Cholesterol Total " 11/13/2023 201 (H)  <=200 mg/dL Final    HDL Cholesterol 11/13/2023 68 (H)  35 - 60 mg/dL Final    Triglyceride 11/13/2023 80  37 - 140 mg/dL Final    Cholesterol/HDL Ratio 11/13/2023 3  0 - 5 Final    Very Low Density Lipoprotein 11/13/2023 16   Final    LDL Cholesterol 11/13/2023 117.00  50.00 - 140.00 mg/dL Final    WBC 11/13/2023 7.47  4.50 - 11.50 x10(3)/mcL Final    RBC 11/13/2023 4.25  4.20 - 5.40 x10(6)/mcL Final    Hgb 11/13/2023 13.1  12.0 - 16.0 g/dL Final    Hct 11/13/2023 39.8  37.0 - 47.0 % Final    MCV 11/13/2023 93.6  80.0 - 94.0 fL Final    MCH 11/13/2023 30.8  27.0 - 31.0 pg Final    MCHC 11/13/2023 32.9 (L)  33.0 - 36.0 g/dL Final    RDW 11/13/2023 13.0  11.5 - 17.0 % Final    Platelet 11/13/2023 256  130 - 400 x10(3)/mcL Final    MPV 11/13/2023 10.7 (H)  7.4 - 10.4 fL Final    Neut % 11/13/2023 45.8  % Final    Lymph % 11/13/2023 25.4  % Final    Mono % 11/13/2023 6.6  % Final    Eos % 11/13/2023 21.0  % Final    Basophil % 11/13/2023 1.1  % Final    Lymph # 11/13/2023 1.90  0.6 - 4.6 x10(3)/mcL Final    Neut # 11/13/2023 3.42  2.1 - 9.2 x10(3)/mcL Final    Mono # 11/13/2023 0.49  0.1 - 1.3 x10(3)/mcL Final    Eos # 11/13/2023 1.57 (H)  0 - 0.9 x10(3)/mcL Final    Baso # 11/13/2023 0.08  <=0.2 x10(3)/mcL Final    IG# 11/13/2023 0.01  0 - 0.04 x10(3)/mcL Final    IG% 11/13/2023 0.1  % Final    NRBC% 11/13/2023 0.0  % Final     Assessment:   Primary Hyperparathyroidism with hypercalcemia  Osteoporeosis  Hypothyroidism     Plan  DEXA last done 12/22 no with osteopenia, patient with wrist fracture in past  Cont Prolia every 6 months. Due today  Vitamin D 60 at last check, continues low-dose over-the-counter supplementation.  Will recheck today  TSH stable at 1.2. Continue Levothyroxine 68.5 mcg daily, patient states no new symptoms of hyper thyroidism or hypothyroidism.  Will recheck TSH today  Still does not want to pursue surgery for Primary Hyperparathyroidism    Ordered today: Renal function  panel. TSH, Vitamin D.       Disposition: RTC in 6mo for Prolia, 12 month for follow-up    [facility]  Internal Medicine - PGY-3

## 2024-03-18 NOTE — TELEPHONE ENCOUNTER
Attempted to reach patient, no answer, unable to leave message, voicemail box not set up. Will try later.

## 2024-03-19 ENCOUNTER — OFFICE VISIT (OUTPATIENT)
Dept: OTOLARYNGOLOGY | Facility: CLINIC | Age: 79
End: 2024-03-19
Payer: MEDICARE

## 2024-03-19 VITALS — TEMPERATURE: 98 F | SYSTOLIC BLOOD PRESSURE: 122 MMHG | HEART RATE: 75 BPM | DIASTOLIC BLOOD PRESSURE: 80 MMHG

## 2024-03-19 DIAGNOSIS — H90.3 SENSORINEURAL HEARING LOSS (SNHL) OF BOTH EARS: Primary | ICD-10-CM

## 2024-03-19 DIAGNOSIS — R42 DIZZINESS: ICD-10-CM

## 2024-03-19 PROCEDURE — 3079F DIAST BP 80-89 MM HG: CPT | Mod: CPTII,,, | Performed by: NURSE PRACTITIONER

## 2024-03-19 PROCEDURE — 3288F FALL RISK ASSESSMENT DOCD: CPT | Mod: CPTII,,, | Performed by: NURSE PRACTITIONER

## 2024-03-19 PROCEDURE — 3074F SYST BP LT 130 MM HG: CPT | Mod: CPTII,,, | Performed by: NURSE PRACTITIONER

## 2024-03-19 PROCEDURE — 1125F AMNT PAIN NOTED PAIN PRSNT: CPT | Mod: CPTII,,, | Performed by: NURSE PRACTITIONER

## 2024-03-19 PROCEDURE — 1159F MED LIST DOCD IN RCRD: CPT | Mod: CPTII,,, | Performed by: NURSE PRACTITIONER

## 2024-03-19 PROCEDURE — 99213 OFFICE O/P EST LOW 20 MIN: CPT | Mod: PBBFAC | Performed by: NURSE PRACTITIONER

## 2024-03-19 PROCEDURE — 99213 OFFICE O/P EST LOW 20 MIN: CPT | Mod: S$PBB,,, | Performed by: NURSE PRACTITIONER

## 2024-03-19 PROCEDURE — 1101F PT FALLS ASSESS-DOCD LE1/YR: CPT | Mod: CPTII,,, | Performed by: NURSE PRACTITIONER

## 2024-03-19 NOTE — PROGRESS NOTES
[facility]  Otolaryngology Clinic Note    Mikala Thompson  YOB: 1945    Chief Complaint: dizziness    HPI: 4/12/21: 75yoF referred for dizziness. Is having dizzy episodes ~1-2x/day which began 1/2021. Describes as feeling is as if balance is off and lightheaded; is unsure what exacerbates this. Also has occasional brief room spinning when she turns over in the bed. Episodes last for a few seconds. Reports having normal orthostatic VS at last PCP visit. Denies appreciable HL or tinnitus. Denies medication change. Endorses falling on either side of her body in June of 2020 and January of 2021.    5/26/21: Reports dizziness has been greatly improved since Epley and with home maneuvers. Requesting to have her ears cleaned. She uses qtips to clean them at home.    6/17/21: Denies otorrhea or otalgia. Continues to do well from a dizziness standpoint.    10/11/2022: Reports she began having vertiginous dizziness again 4-6 weeks ago which is similar to her previous BPPV. She has not had any falls or medication changes. She is wondering if it is because she has ear wax buildup.     11/22/22: States that dizziness had improved following Epley, however, she had a bad episode again last week. She reports resolution following home Epley demonstrated by audiology, but she is very concerned and anxious about it coming back.    5/23/23: Reports continued dizziness, occurring a few times weekly. Feels spinning to lightheaded, similar to before but not as severe. Episodes do not last long. She is unable to say if there are exacerbating factors. Right ear canal has been itchy & she feels that it needs to be cleaned.    11/28/23: States she had been doing very well since last visit, however, she has had a couple of transient episodes of lightheadedness which began 2 days ago. This is not exacerbated by movement. She is wondering if it could be r/t her vision. She has a left eye surgery scheduled  in January & plans to get new glasses following surgery.     3/19/24: Lightheaded episodes have improved. States she is still nervous that dizziness will return.     ROS:   10-point review of systems negative except per HPI      Review of patient's allergies indicates:  No Known Allergies    Past Medical History:   Diagnosis Date    Amblyopia     Cataract     Glaucoma     Hyperlipidemia     Hypertension     Osteoarthritis     Strabismus        Past Surgical History:   Procedure Laterality Date    CATARACT EXTRACTION         Social History     Socioeconomic History    Marital status: Single   Tobacco Use    Smoking status: Never    Smokeless tobacco: Never   Substance and Sexual Activity    Alcohol use: Never    Drug use: Never    Sexual activity: Not Currently     Partners: Male     Birth control/protection: Post-menopausal       Family History   Problem Relation Age of Onset    Cancer Maternal Uncle     Diabetes Maternal Grandmother        Outpatient Encounter Medications as of 10/11/2022   Medication Sig Dispense Refill    amLODIPine (NORVASC) 5 MG tablet Take 5 mg by mouth once daily.      amLODIPine (NORVASC) 5 MG tablet   See Instructions, Take 1 tablet by mouth once daily, # 90 tab(s), 7 Refill(s), Pharmacy: [id number], 150, cm, Height/Length Dosing, 02/01/22 10:09:00 CST, 64.75, kg, Weight Dosing, 02/01/22 10:09:00 CST      cycloSPORINE (RESTASIS) 0.05 % ophthalmic emulsion Place 1 drop into both eyes 2 (two) times daily. 30 each 2    diclofenac (VOLTAREN) 75 MG EC tablet       levothyroxine (SYNTHROID) 137 MCG Tab tablet Take 1/2 (one-half) tablet by mouth once daily 45 tablet 2    losartan (COZAAR) 50 MG tablet       pravastatin (PRAVACHOL) 40 MG tablet   See Instructions, Take 1 tablet by mouth once daily, # 90 tab(s), 5 Refill(s), Pharmacy: [facility]38, 150, cm, Height/Length Dosing, 02/01/22 10:09:00 CST, 64.75, kg, Weight Dosing, 02/01/22 10:09:00 CST       No facility-administered  "encounter medications on file as of 10/11/2022.       Physical Exam:  Vitals:    10/11/22 0858   BP: 113/77   BP Location: Right arm   Patient Position: Sitting   BP Method: Medium (Automatic)   Pulse: 80   Temp: 97.8 °F (36.6 °C)   TempSrc: Oral   Weight: 64.1 kg (141 lb 6.4 oz)   Height: 4' 11" (1.499 m)     General: NAD, voice normal  Neuro: AAO, CN II - XII grossly intact  Head/ Face: NCAT, symmetric, sensations intact bilaterally  Eyes: EOMI, PERRL  Ears: externally normal with grossly normal hearing  AD: EAC occluded with cerumen- atraumatic removal with alligator- TM intact w/o effusion or retraction  AS: EAC patent. TM intact w/o effusion or retraction  Nose: bilateral nares patent, midline septum, no rhinorrhea, no external deformity, no turbinate hypertrophy  OC/OP: MMM, no intraoral lesions, no trismus, dentition is poor, no uvular deviation, bilaterally symmetric soft palate elevation, palatoglossus and palatopharyngeal fold wnl; tonsils are symmetric and 1+  Indirect laryngoscopy: deferred due to patient intolerance  Neck: soft, supple, no LAD, normal ROM, no thyromegaly  Respiratory: nonlabored, no wheezing, bilateral chest rise  Cardiovascular: RRR  Gastrointestinal: S NT ND  Skin: warm, no lesions  Musculoskeletal: 5/5 strength  Psych: Appropriate affect/mood     Audio:         Assessment/Plan:  78 y.o. female with hx of recurrent right BPPV. Audio with moderate SNHL of high frequencies b/l. Recent lightheaded episodes not exacerbated by movement. Will plan to monitor.   - Camara Daroff exercises prn  - Safety precautions  - Annual audio   - RTC 6mo       Annalisa Mack NP      "

## 2024-05-01 ENCOUNTER — HOSPITAL ENCOUNTER (OUTPATIENT)
Dept: RADIOLOGY | Facility: HOSPITAL | Age: 79
Discharge: HOME | End: 2024-05-01
Payer: MEDICARE

## 2024-05-01 ENCOUNTER — OFFICE VISIT (OUTPATIENT)
Dept: ORTHOPEDICS | Facility: CLINIC | Age: 79
End: 2024-05-01
Payer: MEDICARE

## 2024-05-01 VITALS
DIASTOLIC BLOOD PRESSURE: 74 MMHG | WEIGHT: 147.69 LBS | BODY MASS INDEX: 29.77 KG/M2 | HEART RATE: 76 BPM | SYSTOLIC BLOOD PRESSURE: 121 MMHG | RESPIRATION RATE: 18 BRPM | HEIGHT: 59 IN | TEMPERATURE: 98 F

## 2024-05-01 DIAGNOSIS — M17.0 PRIMARY OSTEOARTHRITIS OF BOTH KNEES: ICD-10-CM

## 2024-05-01 DIAGNOSIS — M17.0 PRIMARY OSTEOARTHRITIS OF BOTH KNEES: Primary | ICD-10-CM

## 2024-05-01 PROCEDURE — 99213 OFFICE O/P EST LOW 20 MIN: CPT | Mod: PBBFAC,25

## 2024-05-01 PROCEDURE — 73564 X-RAY EXAM KNEE 4 OR MORE: CPT | Mod: TC,LT

## 2024-05-01 PROCEDURE — 73564 X-RAY EXAM KNEE 4 OR MORE: CPT | Mod: TC,RT

## 2024-05-01 NOTE — PROGRESS NOTES
"Subjective:    Patient ID: Mikala Thompson is a 78 y.o. female  who presented to [facility] for follow up.      Chief Complaint: Pain and Swelling of the Left Knee and Pain and Swelling of the Right Knee      History of Present Illness:  Mikala Thompson is a 77 yo F presented today with atraumatic bilateral knee pain. Onset approximately 5 years ago. Pain is located anterior to knees. Quality of pain is described as constant, Sharp and Aching. Pain 7/10 today. Inciting event: none known. Pain is aggravated by walking. Evaluation to date: plain films, PCP evaluation, PT evaluation, and Ortho evaluation. Treatment to date: bracing, elastic compression, topical analgesics, oral analgesics, CSI, VSI, PT, and home exercises. Last CSI 4/2023 with relief for 2-3 months. Expectations for today's visit includes pain relief.  Occupation: currently unemployed. PCP is Dr. Nahid Lazcano.     Knee Review of Systems:  Swelling?  yes  Instability?  no  Mechanical sx?  no  <30 min AM stiffness? yes  Limited ROM? no  Fever/Chills? no    PMH:  HTN  Hypothyroidism         Objective:      Physical Exam:    /74   Pulse 76   Temp 98.1 °F (36.7 °C)   Resp 18   Ht 4' 11" (1.499 m)   Wt 67 kg (147 lb 11.3 oz)   BMI 29.83 kg/m²     Ortho/SPM Exam    Appearance:  Normal gait/station  FWB  Alignment: Left: normal Right: normal   Soft tissue swelling: Left: no Right: no  Effusion: Left:  Positive Right: Negative  Erythema: Left no Right: no  Ecchymosis: Left: no Right: no  Atrophy: Left: yes Right: yes    Palpation:  Knee Tenderness: Left: Medial joint line Right: Medial joint line    Range of motion:  Flexion (140): Left:  130 Right: 130  Extension (0): Left: 0 Right: 0    Strength:  Extension: Left 5/5  Pain: no     Right 5/5 Pain: no  Flexion: Left 5/5 Pain: no Right   5/5 Pain: no        Special Tests:  Ballotable Effusion:Left: Positive Right: Negative   Fluid Wave: Left: Negative " Right: Negative   Crepitus: Left: Negative Right: Negative   Patellar grind test: Left: Positive  Right: Positive  Apprehension test: Left: Not performed Right: Not performed   Varus: @ 0, Left Positive Right: Positive.  @ 30, Left Positive  Right Positive   Valgus: @ 0, Left Negative Right: Negative.  @ 30, Left Negative  Right Negative  Lachman: Left: Negative Right: Negative   Ant Drawer: Left: Negative Right: Negative   Posterior Drawer: Left: Negative Right: Negative   Dial Test: Left: Not performed Right: Not performed   Payam: Left: Negative Right: Negative   Apley's: Left: Not performed Right: Not performed  Thessaly's: Left: Negative Right: Negative   Noble Compression: Left: Not performed Right: Not performed   Marcial: Left: Not performed Right: Not performed       General appearance: NAD  Peripheral pulses: normal bilaterally 2+ DP bilaterally  Reflexes: Left: not assessed  Right not assessed    Sensation: normal    Labs:  Last A1c: The patient doesn't have any registry metric data available     Imaging:   Previous images reviewed.  X-rays ordered and performed today: yes  # of views: 4 Laterality: bilateral  My Interpretation:  no fracture or dislocation; bilateral knee osteoarthritis as evidenced by joint space narrowing (worse medially), osteophytes, subchondral sclerosis, subchondral cysts.  KL grade 3    Assessment:        Encounter Diagnoses   Code Name Primary?    M17.0 Primary osteoarthritis of both knees Yes        Plan:      Dx: bilateral Knee Osteoarthritis. Chronic in acute severe exacerbation.  Treatment Plan: Discussed with patient diagnosis and treatment recommendations. Given patient has tried and failed conservative treatment and CSI, will submit PA for VSI. Recommend interval conservative treatment to include: avoidance of aggravating activity, significant modification of daily activities, hot/cold therapies, topical and oral medications, braces, HEP/PT, and injections.   Imaging:  radiological studies ordered and independently reviewed; discussed with patient; pending radiologist interpretation.   Weight Management: is paramount. Recommend at least 10 pounds weight loss if your BMI is 25-29.9. A BMI of <24.9 may provide further relief..   Procedure: discussed CSI vs VS injections as treatment options; since conservative measures did not improve symptoms patient consent for VS; pending PA approval.  Activity: Activity as tolerated; HEP to include aerobic conditioning and strength training with non-painful activity. ROM/STG exercises. Proper footware; assistive devices to avoid limping.   Therapy: Physical Therapy. Knee conditioning handout provided.  Medication: START over-the-counter acetaminophen (Tylenol 1000 mg three times per day as needed). Please see your primary care physician for further refills.  RTC: schedule after PA obtained for VSI            Renata Cordero MD  [facility], HO-1       Yes

## 2024-05-08 ENCOUNTER — TELEPHONE (OUTPATIENT)
Dept: ORTHOPEDICS | Facility: CLINIC | Age: 79
End: 2024-05-08
Payer: MEDICARE

## 2024-05-08 NOTE — TELEPHONE ENCOUNTER
----- Message from Norma Hinds MD sent at 5/1/2024 11:09 AM CDT -----  Regarding: Prior Authorization for Viscosupplementation  Please obtain prior authorization for viscosupplementation.  After obtaining, schedule a patient for procedure only appointment as appropriate with [facility]  (Euflexxa/Orthovisc/Hyalgan/Synvisc 1 visit per week for 3 weeks or Durolane 1 visit)    Laterality:bilaterally  Estimated Return to Clinic:ASAP (procedure only)

## 2024-05-13 NOTE — TELEPHONE ENCOUNTER
Patient Approved for SHANTA Euflexxa. Auth #[id number]. Please schedule patient with [facility] From 5/10/24-6/9/24

## 2024-05-15 ENCOUNTER — OFFICE VISIT (OUTPATIENT)
Dept: ORTHOPEDICS | Facility: CLINIC | Age: 79
End: 2024-05-15
Payer: MEDICARE

## 2024-05-15 VITALS
TEMPERATURE: 98 F | WEIGHT: 146 LBS | HEIGHT: 59 IN | DIASTOLIC BLOOD PRESSURE: 70 MMHG | SYSTOLIC BLOOD PRESSURE: 118 MMHG | BODY MASS INDEX: 29.43 KG/M2

## 2024-05-15 DIAGNOSIS — M17.0 PRIMARY OSTEOARTHRITIS OF BOTH KNEES: Primary | ICD-10-CM

## 2024-05-15 PROCEDURE — 99213 OFFICE O/P EST LOW 20 MIN: CPT | Mod: PBBFAC

## 2024-05-15 PROCEDURE — 20610 DRAIN/INJ JOINT/BURSA W/O US: CPT | Mod: 50,PBBFAC | Performed by: STUDENT IN AN ORGANIZED HEALTH CARE EDUCATION/TRAINING PROGRAM

## 2024-05-15 RX ADMIN — Medication 20 MG: at 09:05

## 2024-05-15 NOTE — PROGRESS NOTES
78-year-old female with a history of bilateral knee osteoarthritis presents to the clinic for 1st series of bilateral knee Euflexxa injections.  Denies any injuries since her last visit.  We will follow up next week for 2nd bilateral knee Euflexxa injection.    Large Joint Aspiration/Injection: bilateral supra patellar bursa    Date/Time: 5/15/2024 8:30 AM    Performed by: Zaid Art MD  Authorized by: Zaid Art MD    Consent Done?:  Yes (Written)  Indications:  Arthritis and pain  Site marked: the procedure site was marked    Timeout: prior to procedure the correct patient, procedure, and site was verified    Prep: patient was prepped and draped in usual sterile fashion      Local anesthesia used?: Yes    Local anesthetic:  Topical anesthetic    Details:  Needle Size:  21 G  Approach:  Superior  Location:  Knee  Laterality:  Bilateral  Site:  Bilateral supra patellar bursa  Patient tolerance:  Patient tolerated the procedure well with no immediate complications     Staff: Norma Hinds MD    Risks:  Possible complications with the injection include bleeding, infection (.01%), tendon rupture, steroid flare, fat pad or soft tissue atrophy, skin depigmentation, allergic reaction to medications and vasovagal response. (steroid flare treatment is rest, ice, NSAIDs and resolves in 24-36 hours.)    Consent:  No absolute contraindications (cellulitis overlying joint, infection, lack of informed consent, allergy to injection medication, AVN protein or egg allergy for sodium hyaluronate, or history of steroid flare) or relative contraindications (uncontrolled DM2 A1c>10, coagulopathy, INR > 3.5, previous joint replacement or history of AVN).        Description:  The patient was prepped in normal sterile fashion use of chlorhexidine scrub and the appropriate and anatomic landmarks were identified with ultrasound.  Contents of syringe included: 2ml of 10mg/ml of  Euflexxa Injected.     Post Procedure: Patient alert,  and moving all extremities. ROM improved, pain decreased.  Good peripheral pulses, no signs of vascular compromise and range of motion intact.  Aftercare instructions were given to patient at time of discharge.  Relative rest for 3 days-avoiding excess activity.  Place ice on the area for 15 minutes every 4-6 hours. Patient may take Tylenol a 1000 mg b.i.d. or ibuprofen 600 mg t.i.d. for the next 3-4 days if not on medication already and safe to take pending co-morbidities.  Protect the area for the next 1-8 hours if anesthetic was used.  Avoid excessive activity for the next 3-4 weeks.  ER precautions given for fever, severe joint pain or allergic reaction or other new symptoms related to the joint injection.         This note is dictated using the M*Modal Fluency Direct word recognition program. There are word recognition mistakes that are occasionally missed on review.    Zaid Art D.O.  Sports Medicine Fellow

## 2024-05-20 NOTE — PROGRESS NOTES
Faculty Attestation: Mikala Thompson  was seen in Sports Medicine Clinic. Discussed with Dr. Art at the time of the visit. History of Present Illness, Physical Exam, and Assessment and Plan reviewed. Treatment plan is reasonable and appropriate. Compliance with treatment recommendations is important.   Procedure note reviewed. Patient tolerated procedure well.      Norma Hinds MD  Sports Medicine

## 2024-05-29 ENCOUNTER — OFFICE VISIT (OUTPATIENT)
Dept: ORTHOPEDICS | Facility: CLINIC | Age: 79
End: 2024-05-29
Payer: MEDICARE

## 2024-05-29 VITALS
HEART RATE: 73 BPM | DIASTOLIC BLOOD PRESSURE: 83 MMHG | TEMPERATURE: 98 F | HEIGHT: 59 IN | BODY MASS INDEX: 29.56 KG/M2 | SYSTOLIC BLOOD PRESSURE: 124 MMHG | WEIGHT: 146.63 LBS

## 2024-05-29 DIAGNOSIS — M17.0 PRIMARY OSTEOARTHRITIS OF BOTH KNEES: Primary | ICD-10-CM

## 2024-05-29 PROCEDURE — 20610 DRAIN/INJ JOINT/BURSA W/O US: CPT | Mod: 50,PBBFAC | Performed by: STUDENT IN AN ORGANIZED HEALTH CARE EDUCATION/TRAINING PROGRAM

## 2024-05-29 PROCEDURE — 99213 OFFICE O/P EST LOW 20 MIN: CPT | Mod: PBBFAC,25

## 2024-05-29 RX ADMIN — Medication 20 MG: at 08:05

## 2024-05-29 NOTE — PROGRESS NOTES
78-year-old female with a history of bilateral knee osteoarthritis presents to the clinic for 2nd series of bilateral knee Euflexxa injections.  Denies any injuries since her last visit.  We will follow up next week for her 3rd bilateral knee Euflexxa injections.    Large Joint Aspiration/Injection: bilateral supra patellar bursa    Date/Time: 5/29/2024 7:30 AM    Performed by: Zaid Art MD  Authorized by: Zaid Art MD    Consent Done?:  Yes (Written)  Indications:  Arthritis and pain  Site marked: the procedure site was marked    Timeout: prior to procedure the correct patient, procedure, and site was verified    Prep: patient was prepped and draped in usual sterile fashion      Local anesthesia used?: Yes    Local anesthetic:  Topical anesthetic    Details:  Needle Size:  21 G  Approach:  Superior  Location:  Knee  Laterality:  Bilateral  Site:  Bilateral supra patellar bursa  Patient tolerance:  Patient tolerated the procedure well with no immediate complications     Staff: Mendy Drew MD     Risks:  Possible complications with the injection include bleeding, infection (.01%), tendon rupture, steroid flare, fat pad or soft tissue atrophy, skin depigmentation, allergic reaction to medications and vasovagal response. (steroid flare treatment is rest, ice, NSAIDs and resolves in 24-36 hours.)    Consent:  No absolute contraindications (cellulitis overlying joint, infection, lack of informed consent, allergy to injection medication, AVN protein or egg allergy for sodium hyaluronate, or history of steroid flare) or relative contraindications (uncontrolled DM2 A1c>10, coagulopathy, INR > 3.5, previous joint replacement or history of AVN).        Description:  The patient was prepped in normal sterile fashion use of chlorhexidine scrub and the appropriate and anatomic landmarks were identified with ultrasound.  Contents of syringe included: 2ml of 10mg/ml of  Euflexxa Injected.     Post Procedure: Patient  alert, and moving all extremities. ROM improved, pain decreased.  Good peripheral pulses, no signs of vascular compromise and range of motion intact.  Aftercare instructions were given to patient at time of discharge.  Relative rest for 3 days-avoiding excess activity.  Place ice on the area for 15 minutes every 4-6 hours. Patient may take Tylenol a 1000 mg b.i.d. or ibuprofen 600 mg t.i.d. for the next 3-4 days if not on medication already and safe to take pending co-morbidities.  Protect the area for the next 1-8 hours if anesthetic was used.  Avoid excessive activity for the next 3-4 weeks.  ER precautions given for fever, severe joint pain or allergic reaction or other new symptoms related to the joint injection.              This note is dictated using the M*Modal Fluency Direct word recognition program. There are word recognition mistakes that are occasionally missed on review.    Zaid Art D.O.  Sports Medicine Fellow

## 2024-05-29 NOTE — PROGRESS NOTES
Faculty Attestation: Mikala Thompson  was seen in Sports Medicine Clinic. Patient seen and evaluated at the time of the visit. History of Present Illness, Physical Exam, and Assessment and Plan reviewed from prior encounter. Treatment plan is reasonable and appropriate. Compliance with treatment recommendations is important.  Radiology images independently reviewed and agree with radiologist interpretation. Procedure note reviewed. Present for entire procedure with the fellow. Patient tolerated procedure well.      Mendy Drew MD  Sports Medicine

## 2024-06-03 ENCOUNTER — OFFICE VISIT (OUTPATIENT)
Dept: INTERNAL MEDICINE | Facility: CLINIC | Age: 79
End: 2024-06-03
Payer: MEDICARE

## 2024-06-03 VITALS
HEART RATE: 69 BPM | WEIGHT: 148 LBS | TEMPERATURE: 98 F | OXYGEN SATURATION: 98 % | SYSTOLIC BLOOD PRESSURE: 124 MMHG | HEIGHT: 59 IN | BODY MASS INDEX: 29.84 KG/M2 | RESPIRATION RATE: 16 BRPM | DIASTOLIC BLOOD PRESSURE: 71 MMHG

## 2024-06-03 DIAGNOSIS — R60.9 SWELLING: Primary | ICD-10-CM

## 2024-06-03 PROCEDURE — 99213 OFFICE O/P EST LOW 20 MIN: CPT | Mod: PBBFAC

## 2024-06-03 NOTE — PROGRESS NOTES
[facility] Internal Medicine Clinic Visit    Chief Complaint:      Follow-up (Patient states she has bilateral leg swelling and pain 8 out of 10 x 3 months.) and Medication Refill     Subjective:     HPI:  Mikala Thompson is a 78 y.o. female who has a past medical history of Amblyopia, Benign paroxysmal vertigo, bilateral, Cataract, Glaucoma, Hyperlipidemia, Hypertension, Hypothyroidism, Hypothyroidism, unspecified, Osteoarthritis, and Strabismus.  She presents to clinic today for follow-up of bilateral lower extremity swelling.  She reports she first began to notice a pocket of swelling located medially under her knee joint approx 6 months ago that have continued to grow in size and are associated with pain, especially while walking.  She denies any erythema associated with these swellings and reports no other similar areas on any other part of her body.  Denies any associated injury or trauma and denies any other previous similar episodes.  Reports she was seen by ortho for steroid injections several months ago and state the doctor did not believe these swellings were related to knee joint.        Past Medical History:   Diagnosis Date    Amblyopia     Benign paroxysmal vertigo, bilateral     Cataract     Glaucoma     Hyperlipidemia     Hypertension     Hypothyroidism     Hypothyroidism, unspecified     Osteoarthritis     Strabismus        Past Surgical History:   Procedure Laterality Date    CATARACT EXTRACTION      EYE MUSCLE SURGERY Left 01/26/2024    strabismus sx OS - Dr. Kike Woodard    EYE SURGERY  10/2020    Cataracts    FRACTURE SURGERY Left 06/2020    Wrist    TUBAL LIGATION  02/1980       Social History     Socioeconomic History    Marital status: Single   Tobacco Use    Smoking status: Never    Smokeless tobacco: Never   Substance and Sexual Activity    Alcohol use: Not Currently    Drug use: Never    Sexual activity: Not Currently     Partners: Male     Birth control/protection: Abstinence, Post-menopausal      Social Determinants of Health     Financial Resource Strain: Low Risk  (6/3/2024)    Overall Financial Resource Strain (CARDIA)     Difficulty of Paying Living Expenses: Not very hard   Food Insecurity: No Food Insecurity (6/3/2024)    Hunger Vital Sign     Worried About Running Out of Food in the Last Year: Never true     Ran Out of Food in the Last Year: Never true   Transportation Needs: No Transportation Needs (6/3/2024)    TRANSPORTATION NEEDS     Transportation : No   Physical Activity: Inactive (6/3/2024)    Exercise Vital Sign     Days of Exercise per Week: 0 days     Minutes of Exercise per Session: 0 min   Stress: No Stress Concern Present (6/3/2024)    [employer] [employer] - Occupational Stress Questionnaire     Feeling of Stress : Not at all   Housing Stability: Low Risk  (6/3/2024)    Housing Stability Vital Sign     Unable to Pay for Housing in the Last Year: No     Homeless in the Last Year: No         Current Outpatient Medications   Medication Instructions    amLODIPine (NORVASC) 5 MG tablet See Instructions, Take 1 tablet by mouth once daily, # 90 tab(s), 7 Refill(s), Pharmacy: [id number], 150, cm, Height/Length Dosing, 02/01/22 10:09:00 CST, 64.75, kg, Weight Dosing, 02/01/22 10:09:00 CST    levothyroxine (SYNTHROID) 68.5 mcg, Oral, Daily    losartan (COZAAR) 50 mg, Oral, Daily    pravastatin (PRAVACHOL) 40 MG tablet See Instructions, Take 1 tablet by mouth once daily, # 90 tab(s), 5 Refill(s), Pharmacy: [id number], 150, cm, Height/Length Dosing, 02/01/22 10:09:00 CST, 64.75, kg, Weight Dosing, 02/01/22 10:09:00 CST       Review of Systems  Review of Systems   Constitutional:  Negative for chills and fever.   HENT:  Negative for hearing loss and sore throat.    Eyes:  Negative for photophobia, pain and discharge.   Respiratory:  Negative for cough, shortness of breath and wheezing.    Cardiovascular:  Negative for chest pain and palpitations.  "  Gastrointestinal:  Negative for abdominal pain, blood in stool, constipation, diarrhea, nausea and vomiting.   Genitourinary:  Negative for dysuria, flank pain and hematuria.   Musculoskeletal:  Positive for joint pain (chronic). Negative for falls, myalgias and neck pain.   Skin:  Negative for itching and rash.   Neurological:  Negative for dizziness, tingling, tremors, sensory change, speech change, focal weakness, seizures, loss of consciousness, weakness and headaches.   Endo/Heme/Allergies:  Negative for polydipsia. Does not bruise/bleed easily.       Objective:   Last 24 Hour Vital Signs:  Vitals  BP: 124/71  Temp: 97.7 °F (36.5 °C)  Temp Source: Oral  Pulse: 69  Resp: 16  SpO2: 98 %  Height: 4' 11" (149.9 cm)  Weight: 67.1 kg (148 lb)       Physical Examination:  Vital signs and nursing notes reviewed.  Constitutional: Patient is in NAD. Awake and alert.    Head: Atraumatic. Normocephalic.  Eyes: Conjunctivae nl. No scleral icterus. Strabismus to left eye.   ENT: Mucous membranes are moist. Oropharynx is clear.  Neck: Supple. Full ROM.    Cardiovascular: Regular rate and rhythm. No murmurs, rubs, or gallops. Distal pulses are 2+ and symmetric .  Pulmonary/Chest: No respiratory distress. Clear to auscultation bilaterally. No wheezing, rales, or rhonchi.  Abdominal: Soft. Non-distended. No TTP. No rebound, guarding, or rigidity.   Musculoskeletal: Moves all extremities. No edema.  Large area of swelling just below the medial knee bilaterally.  Mild tenderness. No erythema.  Full range of motion of bilateral knees.  No tenderness to the joint lines.  Skin: Warm and dry.  Neurological: Awake and alert. No acute focal neurological deficits are appreciated.    Assessment & Plan:     Suspected bilateral lipomatosis   -Large area of swelling noted just below the medial knees bilaterally.  Appear soft and fluid with mild tenderness to palpation and no associated erythema.    -orderin ultrasound of bilateral LE for " further workup   -can consider biopsy in the future if warranted.    -continue to monitor     Follow-up in 1 month with PCP for further management of chronic medical conditions.     Alona Hylton MD  [facility] Internal Medicine, PGY-2

## 2024-06-03 NOTE — PROGRESS NOTES
I have reviewed the notes and examined the patient with the resident, I reviewed the assessment, and management plan performed by resident, I concur with her documentation of Mikala Thompson.  Date of Service: 6/3/2024

## 2024-06-05 ENCOUNTER — HOSPITAL ENCOUNTER (OUTPATIENT)
Dept: RADIOLOGY | Facility: HOSPITAL | Age: 79
Discharge: HOME | End: 2024-06-05
Payer: MEDICARE

## 2024-06-05 DIAGNOSIS — R60.9 SWELLING: ICD-10-CM

## 2024-06-05 PROCEDURE — 76882 US LMTD JT/FCL EVL NVASC XTR: CPT | Mod: TC,LT

## 2024-06-05 PROCEDURE — 76882 US LMTD JT/FCL EVL NVASC XTR: CPT | Mod: TC,RT

## 2024-06-12 ENCOUNTER — OFFICE VISIT (OUTPATIENT)
Dept: ORTHOPEDICS | Facility: CLINIC | Age: 79
End: 2024-06-12
Payer: MEDICARE

## 2024-06-12 VITALS
DIASTOLIC BLOOD PRESSURE: 75 MMHG | HEART RATE: 81 BPM | SYSTOLIC BLOOD PRESSURE: 109 MMHG | BODY MASS INDEX: 29.07 KG/M2 | HEIGHT: 59 IN | WEIGHT: 144.19 LBS

## 2024-06-12 DIAGNOSIS — M17.0 PRIMARY OSTEOARTHRITIS OF BOTH KNEES: Primary | ICD-10-CM

## 2024-06-12 PROCEDURE — 99213 OFFICE O/P EST LOW 20 MIN: CPT | Mod: PBBFAC

## 2024-06-12 PROCEDURE — 20610 DRAIN/INJ JOINT/BURSA W/O US: CPT | Mod: 50,PBBFAC

## 2024-06-12 RX ADMIN — Medication 20 MG: at 08:06

## 2024-06-12 NOTE — PROGRESS NOTES
Subjective:    Patient ID: Mikala Thompson is a 78 y.o. female  who presented to [facility] for follow up.    Here for Euflexxa 3/3    Large Joint Aspiration/Injection: bilateral knee    Date/Time: 6/12/2024 7:30 AM    Performed by: Dario Vazquez MD  Authorized by: Dario Vazquez MD    Consent Done?:  Yes (Written)  Indications:  Arthritis and pain  Timeout: prior to procedure the correct patient, procedure, and site was verified    Prep: patient was prepped and draped in usual sterile fashion      Local anesthesia used?: Yes    Local anesthetic:  Topical anesthetic    Details:  Needle Size:  21 G  Ultrasonic Guidance for needle placement?: No    Approach:  Anterolateral  Location:  Knee  Laterality:  Bilateral  Site:  Bilateral knee  Patient tolerance:  Patient tolerated the procedure well with no immediate complications     Staff: Norma Hinds MD    Risks:  Possible complications with the injection include bleeding, infection (.01%), tendon rupture, steroid flare, fat pad or soft tissue atrophy, skin depigmentation, allergic reaction to medications and vasovagal response. (steroid flare treatment is rest, ice, NSAIDs and resolves in 24-36 hours.)    Consent:  No absolute contraindications (cellulitis overlying joint, infection, lack of informed consent, allergy to injection medication, AVN protein or egg allergy for sodium hyaluronate, or history of steroid flare) or relative contraindications (uncontrolled DM2 A1c>10, coagulopathy, INR > 3.5, previous joint replacement or history of AVN).        Description:  The patient was prepped in normal sterile fashion use of chlorhexidine scrub and the appropriate and anatomic landmarks were identified without ultrasound.  Contents of syringe included: 2ml of 10mg/ml of  Euflexxa Injected.     Post Procedure: Patient alert, and moving all extremities. ROM improved, pain decreased.  Good peripheral pulses, no signs of  vascular compromise and range of motion intact.  Aftercare instructions were given to patient at time of discharge.  Relative rest for 3 days-avoiding excess activity.  Place ice on the area for 15 minutes every 4-6 hours. Patient may take Tylenol a 1000 mg b.i.d. or ibuprofen 600 mg t.i.d. for the next 3-4 days if not on medication already and safe to take pending co-morbidities.  Protect the area for the next 1-8 hours if anesthetic was used.  Avoid excessive activity for the next 3-4 weeks.  ER precautions given for fever, severe joint pain or allergic reaction or other new symptoms related to the joint injection.         Dario Vazquez MD  [facility], PGY-1

## 2024-06-13 ENCOUNTER — OFFICE VISIT (OUTPATIENT)
Dept: OPHTHALMOLOGY | Facility: CLINIC | Age: 79
End: 2024-06-13
Payer: MEDICARE

## 2024-06-13 VITALS — BODY MASS INDEX: 29.07 KG/M2 | WEIGHT: 144.19 LBS | HEIGHT: 59 IN

## 2024-06-13 DIAGNOSIS — H40.013 AT LOW RISK FOR OPEN-ANGLE GLAUCOMA IN BOTH EYES: ICD-10-CM

## 2024-06-13 DIAGNOSIS — H25.811 COMBINED FORMS OF AGE-RELATED CATARACT OF RIGHT EYE: Primary | ICD-10-CM

## 2024-06-13 DIAGNOSIS — H04.123 DRY EYE SYNDROME OF BOTH EYES: ICD-10-CM

## 2024-06-13 PROCEDURE — 92025 CPTRIZED CORNEAL TOPOGRAPHY: CPT | Mod: PBBFAC,PN | Performed by: OPHTHALMOLOGY

## 2024-06-13 PROCEDURE — 92136 OPHTHALMIC BIOMETRY: CPT | Mod: PBBFAC,PN | Performed by: OPHTHALMOLOGY

## 2024-06-13 PROCEDURE — 99213 OFFICE O/P EST LOW 20 MIN: CPT | Mod: PBBFAC,PN,25

## 2024-06-13 RX ORDER — CYCLOP/TROP/PROPA/PHEN/KET/WAT 1-1-0.1%
1 DROPS (EA) OPHTHALMIC (EYE)
OUTPATIENT
Start: 2024-06-13

## 2024-06-13 RX ORDER — SODIUM CHLORIDE 0.9 % (FLUSH) 0.9 %
10 SYRINGE (ML) INJECTION
OUTPATIENT
Start: 2024-06-13

## 2024-06-13 NOTE — PROGRESS NOTES
HPI    RTC 2-3 months dfe possible cat eval OD (repeat calcs as other eye done   2020)   Last edited by Liseth Johnson MA on 6/13/2024  9:46 AM.            Assessment /Plan     For exam results, see Encounter Report.    There are no diagnoses linked to this encounter.      Ancillary Testing    OCTrnfl    2/29/24: OD 81 thinning IN (unreliable unable to find cup), 78 all green   8/31/2023: OD: thinning nasally, unreliable due to difficulty finding the cup.  OS: unreliable due to blockage superiorly    HVF 24-2  - 7/19/22  OD: 2/10 FL 0% FP 0% FN, nonspecific scattered deficits  OS: 1/10 FL 4% FP 0% FN, full field  - 11/18/22  OD: FL 0/11, FP 5%, FN 0%, few central defects  OS: FL 9/11, FP 0%, FN 7%, enlarged blind spot/dense defect temporally    Steve 6/13/24  OD: 43.36/45.15x056, regular astigmatism total 1.79D  OS: 43.66/45.26x076, regular astigmatism total 1.6D       1. Myopia OU with posterior staphyloma OS, esotropia alternating  - Patient with long standing myopia. Does have hx of thyroid disease but not c/w restrictive disease.   - On further questioning she reports her left eye has always been weaker than her right eye. Esotropia started after cataract surgery OS.   - ~25PD ET in primary gaze stable. Alternating  - s/p strab surgery with estela 1/26/24  - 2/29/24 doing well, has fu visits in next few weeks, Will cont to follow  6/13/24: Still has residual ET about 15PD      2. Combined form of age-related cataract, OD  - Patient with visually significant cataract and desires surgical removal. Thoroughly discussed cataract surgery today, risks/benefits/alternatives discussed and informed consent obtained, signed, and placed in the chart.  - MRx done today; BCVA: 20/60  - IOP today: 12  - Pt interested in cataract surgery and qualifies for surgery  - Trauma: none  - Guttae: none  - Phaco/iridodonesis: none  - Trypan blue: no  - Flomax use: no  - Dilation: good  - Anticoagulant/antiplatelet use: ok to continue if  taking  - Cooperative with exam:  Pt able to lie flat for 30 minutes, will plan to do under local  - Comorbidities: hypertension, hLD, hyperthyroid  - Medical clearance: not required  - Lens to be used: TBD, toric lens  - Date of surgery: 7/2/24 with Dr. Mcarthur/Dr. Gil  - RTC: POD #1       3. Open angle with borderline glaucoma findings, low risk, both eyes- resolved  - Based on CDR asymmetry  - CCT thin OS>OD  - Gonio open to CBB  - OCT RNFL 1/25/22: IN thinning OD, full OS  2/292/24 81 IN thinning, 78 all green   - HVF   7/2022 essentially full with nonspecific deficits OD (does not correlate with OCT thinning)  11/2022: full with nonspecific central defects right eye // left eye unreliable due to high FL  - Given IOP mid-low teens and tilted disc likely obscuring OCT findings, continue to monitor off drops.  -2/29/24: still poor scan in OD due to tilting, OS full octn. Iop remains wnl. Very small cups ou, Will stop OCTN/VF as this time, no suspicion for glaucoma     4. Blepharitis and Keratoconjunctivitis sicca OU  - With complaint of burning most hours of the day  - Did not notice improvement with lotemax, restasis, xiidra, BCL, Avenova, punctal plugs  - Has punctal plug in place OS, absent OD  - Lab work: SSA, SSB, YAW, RF, ACE/lysozyme, scleroderma, Anti-smith, CRP/ESR all within normal limits  - Current regimen: PFATs q3 hours, WC & LS 1-2 times/day, genteal   -2/29/24 no dryness on exam today     5. Bilateral lower eyelid laxity, trace involutional ectropion  - May be contributing to eye surface symptoms  - Will treat surface as above & defer any surgery until after possible cataract surgery and Dr. Woodard strabismus eval    6. Pseudophakia OS (10/8/20)  7 pco OS   - BCVA 20/25 11/18/22  - 2/29/24: starting to see temporal crescent in OS, with new ealry pco temporally, dfe otherwise wnl. RD precautions discussed but would hold off on yag at this time     RTC POD1

## 2024-06-13 NOTE — H&P
Pre-Operative History & Physical  Ophthalmology      SUBJECTIVE:     History of Present Illness:  Patient is a 78 y.o. female presents with cataract of right eye.     MEDICATIONS: (Not in a hospital admission)      ALLERGIES: Review of patient's allergies indicates:  No Known Allergies    PAST MEDICAL HISTORY:   Past Medical History:   Diagnosis Date    Amblyopia     Benign paroxysmal vertigo, bilateral     Cataract     Glaucoma     Hyperlipidemia     Hypertension     Hypothyroidism     Hypothyroidism, unspecified     Osteoarthritis     Strabismus      PAST SURGICAL HISTORY:   Past Surgical History:   Procedure Laterality Date    CATARACT EXTRACTION      EYE MUSCLE SURGERY Left 01/26/2024    strabismus sx OS - Dr. Kike Woodard    EYE SURGERY  10/2020    Cataracts    FRACTURE SURGERY Left 06/2020    Wrist    TUBAL LIGATION  02/1980     PAST FAMILY HISTORY:   Family History   Problem Relation Name Age of Onset    Cancer Maternal Uncle Uncle Luis Eduardo     Diabetes Maternal Grandmother Annie Pimentel      SOCIAL HISTORY:   Social History     Tobacco Use    Smoking status: Never    Smokeless tobacco: Never   Substance Use Topics    Alcohol use: Not Currently    Drug use: Never        MENTAL STATUS: Alert    REVIEW OF SYSTEMS: Negative    OBJECTIVE:     Vital Signs (Most Recent)       Physical Exam:  General: NAD  HEENT: cataract right eye  Lungs: Adequate respirations, symmetrical movements, non-labored  Heart: Intact distal pulses  Abdomen: Soft, nondistended, nontender    ASSESSMENT/PLAN:     Patient is a 78 y.o. female with No admission diagnoses are documented for this encounter..    - Plan for surgical correction with CEIOL, toric lens right eye (OD).   - Allergies reviewed: Review of patient's allergies indicates:  No Known Allergies  - Risks/benefits/alternatives of the procedure including, but not limited to scarring, bleeding, infection, loss or decreased vision, and/or need for possible repeat surgery discussed with  the patient and family.  - Informed consent obtained prior to surgery and the patient/family voiced good understanding.    James Keenan MD  [facility]hthal[facility], PGY-2

## 2024-06-13 NOTE — PROGRESS NOTES
Faculty Attestation: Mikala Thompson  was seen in Sports Medicine Clinic. History of Present Illness, Physical Exam, and Assessment and Plan reviewed. Treatment plan is reasonable and appropriate. Compliance with treatment recommendations is important.    Procedure note reviewed. Present for entire procedure with the resident. Patient tolerated procedure well.      Norma Hinds MD  Sports Medicine

## 2024-06-17 ENCOUNTER — ANESTHESIA EVENT (OUTPATIENT)
Dept: SURGERY | Facility: HOSPITAL | Age: 79
End: 2024-06-17
Payer: MEDICARE

## 2024-06-17 NOTE — ANESTHESIA PREPROCEDURE EVALUATION
Mikala Thompson is a 78 y.o. female PRESENTING FOR PHACOEMULSIFICATION, CATARACT, WITH IOL INSERTION (Right) with a history of  -CATARACT    Vitals:    07/02/24 0709 07/02/24 0716 07/02/24 0730 07/02/24 0843   BP:  122/79 122/79 120/72   Pulse:  80  82   Resp:    20   Temp:  36.5 °C (97.7 °F)  36.3 °C (97.3 °F)   TempSrc:  Oral  Temporal   SpO2:  99%  100%   Weight: 63.4 kg (139 lb 12.8 oz)            -GLAUCOMA  -HTN  -HLD  -HYPOTHYROIDISM; TSH 2.176, 3/15/24  -OA  -BPPV  -BMI, 29  -OSTEOPOROSIS     BETA-BLOCKER: none    New Orders for Anesthesia: NONE    Patient Active Problem List   Diagnosis    Blepharitis of both eyes    Open angle glaucoma suspect with borderline findings at low risk    Primary hyperparathyroidism    Osteoporosis    Vitamin D deficiency    Hashimoto's thyroiditis    Keratoconjunctivitis sicca       Past Surgical History:   Procedure Laterality Date    CATARACT EXTRACTION      EYE MUSCLE SURGERY Left 01/26/2024    strabismus sx OS - Dr. Kike Woodard    EYE SURGERY  10/2020    Cataracts    FRACTURE SURGERY Left 06/2020    Wrist    TUBAL LIGATION  02/1980       Lab Results   Component Value Date    WBC 7.47 11/13/2023    HGB 13.1 11/13/2023    HCT 39.8 11/13/2023     11/13/2023       CMP  Sodium   Date Value Ref Range Status   03/15/2024 140 136 - 145 mmol/L Final     Potassium   Date Value Ref Range Status   03/15/2024 4.3 3.5 - 5.1 mmol/L Final     Chloride   Date Value Ref Range Status   03/15/2024 108 (H) 98 - 107 mmol/L Final     CO2   Date Value Ref Range Status   03/15/2024 25 23 - 31 mmol/L Final     Blood Urea Nitrogen   Date Value Ref Range Status   03/15/2024 24.5 (H) 9.8 - 20.1 mg/dL Final     Creatinine   Date Value Ref Range Status   03/15/2024 0.92 0.55 - 1.02 mg/dL Final     Calcium   Date Value Ref Range Status   03/15/2024 10.9 (H) 8.4 - 10.2 mg/dL Final     Albumin   Date Value Ref Range Status   03/15/2024 3.7 3.4 - 4.8 g/dL Final     Bilirubin Total   Date  Value Ref Range Status   11/13/2023 0.5 <=1.5 mg/dL Final     ALP   Date Value Ref Range Status   11/13/2023 71 40 - 150 unit/L Final     AST   Date Value Ref Range Status   11/13/2023 13 5 - 34 unit/L Final     ALT   Date Value Ref Range Status   11/13/2023 10 0 - 55 unit/L Final     eGFR   Date Value Ref Range Status   03/15/2024 >60 mls/min/1.73/m2 Final       Current Outpatient Medications   Medication Instructions    amLODIPine (NORVASC) 5 MG tablet See Instructions, Take 1 tablet by mouth once daily, # 90 tab(s), 7 Refill(s), Pharmacy: [id number], 150, cm, Height/Length Dosing, 02/01/22 10:09:00 CST, 64.75, kg, Weight Dosing, 02/01/22 10:09:00 CST    levothyroxine (SYNTHROID) 68.5 mcg, Oral, Daily    losartan (COZAAR) 50 mg, Oral, Daily    pravastatin (PRAVACHOL) 40 MG tablet See Instructions, Take 1 tablet by mouth once daily, # 90 tab(s), 5 Refill(s), Pharmacy: [id number], 150, cm, Height/Length Dosing, 02/01/22 10:09:00 CST, 64.75, kg, Weight Dosing, 02/01/22 10:09:00 CST       Past anesthesia records: NONE      Pre-op Assessment    I have reviewed the Patient Summary Reports.     I have reviewed the Nursing Notes. I have reviewed the NPO Status.   I have reviewed the Medications.     Review of Systems  Anesthesia Hx:  No problems with previous Anesthesia   History of prior surgery of interest to airway management or planning:          Denies Family Hx of Anesthesia complications.    Denies Personal Hx of Anesthesia complications.                    Hematology/Oncology:  Hematology Normal   Oncology Normal                                   EENT/Dental:  EENT/Dental Normal           Cardiovascular:  Cardiovascular Normal                                            Pulmonary:  Pulmonary Normal                       Renal/:  Renal/ Normal                 Hepatic/GI:  Hepatic/GI Normal                 Musculoskeletal:  Musculoskeletal Normal                Neurological:  Neurology  Normal                                      Endocrine:  Endocrine Normal            Dermatological:  Skin Normal    Psych:  Psychiatric Normal                  Physical Exam  General: Well nourished, Cooperative, Alert and Oriented    Airway:  Mallampati: I / I  Mouth Opening: Normal  TM Distance: Normal  Tongue: Normal  Neck ROM: Normal ROM    Dental:  Intact      Anesthesia Plan  Type of Anesthesia, risks & benefits discussed:    Anesthesia Type: MAC  Intra-op Monitoring Plan: Standard ASA Monitors  Post Op Pain Control Plan: IV/PO Opioids PRN  Induction:  IV  Airway Plan: Direct  Informed Consent: Informed consent signed with the Patient representative and all parties understand the risks and agree with anesthesia plan.  All questions answered. Patient consented to blood products? No  ASA Score: 3  Day of Surgery Review of History & Physical: H&P Update referred to the surgeon/provider.    Ready For Surgery From Anesthesia Perspective.     .

## 2024-06-21 ENCOUNTER — TELEPHONE (OUTPATIENT)
Dept: INTERNAL MEDICINE | Facility: CLINIC | Age: 79
End: 2024-06-21
Payer: MEDICARE

## 2024-06-21 NOTE — TELEPHONE ENCOUNTER
Pt calling about lab results. Pt states she seen on her pt portal and doesn't quiet understand them and requesting a call back. [phone]

## 2024-06-24 NOTE — TELEPHONE ENCOUNTER
Contacted patient ID and  verified. Patient informed of provider's recommendation to contact her orthopedic doctor for further assistance to eval/tx. Patient verbalized complete understanding and stated she spoke with Dr. Lazcano on Friday and he stated the same.

## 2024-06-25 ENCOUNTER — TELEPHONE (OUTPATIENT)
Dept: ORTHOPEDICS | Facility: CLINIC | Age: 79
End: 2024-06-25
Payer: MEDICARE

## 2024-06-25 NOTE — TELEPHONE ENCOUNTER
Patient called stating that she having some swelling on the medial side of her knee and she even went have an ultrasound down.  It showed that she has fluid in the knees and her primary care told her that she needs to see the orthopedic.    Schedule her an appointment to come in to see doctor

## 2024-06-26 ENCOUNTER — OFFICE VISIT (OUTPATIENT)
Dept: ORTHOPEDICS | Facility: CLINIC | Age: 79
End: 2024-06-26
Payer: MEDICARE

## 2024-06-26 VITALS
SYSTOLIC BLOOD PRESSURE: 158 MMHG | WEIGHT: 144.19 LBS | HEIGHT: 59 IN | DIASTOLIC BLOOD PRESSURE: 84 MMHG | BODY MASS INDEX: 29.07 KG/M2 | HEART RATE: 72 BPM

## 2024-06-26 DIAGNOSIS — M25.562 PAIN IN BOTH KNEES, UNSPECIFIED CHRONICITY: Primary | ICD-10-CM

## 2024-06-26 DIAGNOSIS — M25.561 PAIN IN BOTH KNEES, UNSPECIFIED CHRONICITY: Primary | ICD-10-CM

## 2024-06-26 PROCEDURE — 99213 OFFICE O/P EST LOW 20 MIN: CPT | Mod: PBBFAC

## 2024-06-26 RX ORDER — LIDOCAINE HYDROCHLORIDE 10 MG/ML
5 INJECTION, SOLUTION EPIDURAL; INFILTRATION; INTRACAUDAL; PERINEURAL
Status: CANCELLED | OUTPATIENT
Start: 2024-06-26 | End: 2024-06-26

## 2024-06-26 RX ORDER — LIDOCAINE HYDROCHLORIDE 10 MG/ML
1 INJECTION, SOLUTION EPIDURAL; INFILTRATION; INTRACAUDAL; PERINEURAL
Status: DISPENSED | OUTPATIENT
Start: 2024-06-26

## 2024-06-26 RX ORDER — TRIAMCINOLONE ACETONIDE 40 MG/ML
40 INJECTION, SUSPENSION INTRA-ARTICULAR; INTRAMUSCULAR
Status: DISPENSED | OUTPATIENT
Start: 2024-06-26

## 2024-06-26 RX ORDER — TRIAMCINOLONE ACETONIDE 40 MG/ML
40 INJECTION, SUSPENSION INTRA-ARTICULAR; INTRAMUSCULAR
Status: CANCELLED | OUTPATIENT
Start: 2024-06-26 | End: 2024-06-26

## 2024-06-26 RX ORDER — TRIAMCINOLONE ACETONIDE 40 MG/ML
40 INJECTION, SUSPENSION INTRA-ARTICULAR; INTRAMUSCULAR ONCE
Status: CANCELLED | OUTPATIENT
Start: 2024-06-26 | End: 2024-06-26

## 2024-06-26 NOTE — PROGRESS NOTES
"Subjective:    Patient ID: Mikala Thompson is a 78 y.o. female  who presented to [facility] for follow up.      Chief Complaint: Pain of the Right Knee and Pain of the Left Knee      History of Present Illness:  Mikala Thompson with a history of bilateral knee osteoarthritis presents to the clinic complaining of bilateral knee swelling onset 1 month ago.  She was last evaluated on June 12, 2024 when she received her 3rd bilateral knee Euflexxa injection which resolved her bilateral knee pain.  She admits to swelling and 9/10 pain that is described as burning and "electrical shock" inferomedial to the bilateral patella.  Denies any erythema/fever/pus/discharge. She had a recent bilateral ultrasound study done which revealed no abnormality to the bilateral inferomedial knee area but it did reveal minimal fluid in the suprapatellar bursa.Treatment to date: compression with ace wrap, bracing, topical analgesics, oral analgesics, CSI (last performed on4/2023 with relief for 2-3 months), VSI (3rd bilateral knee Euflexxa on 6/12/2024), PT, and home exercises. Expectations for today's visit includes pain relief.  Occupation: currently unemployed. PCP is Dr. Nahid Lazcano.      Knee Review of Systems:  Swelling?  yes  Instability?  no  Mechanical sx?  no  <30 min AM stiffness? no  Limited ROM? no  Fever/Chills? no       Objective:      Physical Exam:    BP (!) 158/84   Pulse 72   Ht 4' 11" (1.499 m)   Wt 65.4 kg (144 lb 2.9 oz)   BMI 29.12 kg/m²     Ortho/SPM Exam    Appearance:  Normal gait/station  FWB  Alignment: Left: normal Right: normal   Soft tissue swelling: Left: no Right: no  Effusion: Left:  Negative Right: Negative  Erythema: Left no Right: no  Ecchymosis: Left: no Right: no  Atrophy: Left: no Right: no  No pus, discharge, fluctuance    Palpation:  Knee Tenderness: Left: inferomedial to the patella Right: inferomedial to the patella     Range of motion:  Flexion " (140): Left:  120 Right: 120  Extension (0): Left: 0 Right: 0    Strength:  Extension: Left 5/5  Pain: yes     Right 5/5 Pain: yes  Flexion: Left 5/5 Pain: no Right   5/5 Pain: no    Special Tests:  Ballotable Effusion:Left: Negative Right: Negative   Fluid Wave: Left: Negative Right: Negative   Crepitus: Left: Negative Right: Negative   Patellar grind test: Left: Negative  Right: Negative  Apprehension test: Left: Negative Right: Negative   Varus: @ 0, Left Negative Right: Negative.  @ 30, Left Negative  Right Negative   Valgus: @ 0, Left Negative Right: Negative.  @ 30, Left Negative  Right Negative  Lachman: Left: Negative Right: Negative   Ant Drawer: Left: Negative Right: Negative   Posterior Drawer: Left: Negative Right: Negative   Dial Test: Left: Not performed Right: Not performed   Payam: Left: Negative Right: Negative   Apley's: Left: Not performed Right: Not performed  Thessaly's: Left: Not performed Right: Not performed   Noble Compression: Left: Not performed Right: Not performed   Marcial: Left: Not performed Right: Not performed       General appearance: NAD  Peripheral pulses: normal bilaterally   Reflexes: Left: normal Right normal   Sensation: normal    Labs:  Last A1c: The patient doesn't have any registry metric data available     Imaging:   Previous images reviewed.  X-rays ordered and performed today: no  My Interpretation:  Left kl grade 2 and right kl grade 4 degenerative changes noted.    Limited US of the bilateral knees revealed an insignificant amount of fluid in the bilateral pes anserine bursa    Assessment:        Encounter Diagnoses   Code Name Primary?    M25.561, M25.562 Pain in both knees, unspecified chronicity Yes        Plan:      Dx:  Bilateral knee pain, acute  Treatment Plan: Discussed with patient diagnosis, prognosis, and treatment recommendations. Education provided. Diagnostic ultrasound guided right inferomedial genicular nerve block was performed today which reduced  the patient's symptoms from a 9/10 to a 1/10. Her remaining pain may be due to a pes anserine bursitis. Discussed conservative management including avoiding aggravating activities, activity modification, oral/topical analgesics, cold and hot therapy, HEP, formal PT, and to follow up with us in 2 weeks. Will consider a left ultrasound-guided inferomedial genicular nerve block if patient has a significant amount of pain relief from today's procedure. Otherwise, will order a bilateral knee MRI for further evaluation.  Imaging: prior radiological studies independently reviewed; discussed with patient; agree with radiologist interpretation.   Weight Management: is paramount. Recommend at least 10 pounds weight loss if your BMI is 25-29.9. A BMI of <24.9 may provide further relief..   Procedure:  discussed nerve block as treatment options; since conservative measures did not improve symptoms patient consented for right inferomedial genicular nerve block today. Post procedural pain: 1/10  Activity: Activity as tolerated; HEP to include aerobic conditioning and strength training with non-painful activity. ROM/STG exercises. Proper footware; assistive devises to avoid limping.   Therapy: Physical Therapy  Medication: CONTINUE over-the-counter NSAIDs (ibuprofen 200mg three tablets three times a day as needed). Please see your primary care physician for further refills.  RTC: 2 weeks        Right inferomedial genicular nerve block    Date/Time: 6/26/2024 9:50 AM    Performed by: Zaid Art MD  Authorized by: Zaid Art MD    Consent Done?:  Yes (Written)  Indications:  Pain and diagnostic evaluation  Site marked: the procedure site was marked    Timeout: prior to procedure the correct patient, procedure, and site was verified    Prep: patient was prepped and draped in usual sterile fashion      Local anesthesia used?: Yes    Local anesthetic:  Topical anesthetic    Details:  Needle Size:  18 G and 21 G  Approach:   Medial  Location:  Knee  Knee joint: right inferomedial genicular nerve.  Patient tolerance:  Patient tolerated the procedure well with no immediate complications     Staff: Mendy Drew MD     Risks:  Possible complications with the injection include bleeding, infection (.01%), tendon rupture, steroid flare, fat pad or soft tissue atrophy, skin depigmentation, allergic reaction to medications and vasovagal response. (steroid flare treatment is rest, ice, NSAIDs and resolves in 24-36 hours.)    Consent:  No absolute contraindications (cellulitis overlying joint, infection, lack of informed consent, allergy to injection medication, AVN protein or egg allergy for sodium hyaluronate, or history of steroid flare) or relative contraindications (uncontrolled DM2 A1c>10, coagulopathy, INR > 3.5, previous joint replacement or history of AVN).        Description:  The patient was prepped in normal sterile fashion use of chlorhexidine scrub and the appropriate and anatomic landmarks were identified with ultrasound.  Contents of syringe included: Injection content :1cc of 1% lidocaine and 40 mg of triamcinolone.     Post Procedure: Patient alert, and moving all extremities. ROM improved, pain decreased.  Good peripheral pulses, no signs of vascular compromise and range of motion intact.  Aftercare instructions were given to patient at time of discharge.  Relative rest for 3 days-avoiding excess activity.  Place ice on the area for 15 minutes every 4-6 hours. Patient may take Tylenol a 1000 mg b.i.d. or ibuprofen 600 mg t.i.d. for the next 3-4 days if not on medication already and safe to take pending co-morbidities.  Protect the area for the next 1-8 hours if anesthetic was used.  Avoid excessive activity for the next 3-4 weeks.  ER precautions given for fever, severe joint pain or allergic reaction or other new symptoms related to the joint injection.         This note is dictated using the M*Modal Fluency Direct word  recognition program. There are word recognition mistakes that are occasionally missed on review.    Zaid Art D.O.  Sports Medicine Fellow

## 2024-06-29 ENCOUNTER — PATIENT MESSAGE (OUTPATIENT)
Dept: ADMINISTRATIVE | Facility: OTHER | Age: 79
End: 2024-06-29
Payer: MEDICARE

## 2024-07-01 ENCOUNTER — PATIENT MESSAGE (OUTPATIENT)
Dept: SURGERY | Facility: HOSPITAL | Age: 79
End: 2024-07-01
Payer: MEDICARE

## 2024-07-01 ENCOUNTER — PATIENT MESSAGE (OUTPATIENT)
Dept: ADMINISTRATIVE | Facility: OTHER | Age: 79
End: 2024-07-01
Payer: MEDICARE

## 2024-07-01 RX ORDER — SODIUM CHLORIDE 9 MG/ML
INJECTION, SOLUTION INTRAVENOUS CONTINUOUS
OUTPATIENT
Start: 2024-07-01

## 2024-07-01 RX ORDER — LIDOCAINE HYDROCHLORIDE 10 MG/ML
1 INJECTION, SOLUTION EPIDURAL; INFILTRATION; INTRACAUDAL; PERINEURAL ONCE
OUTPATIENT
Start: 2024-07-01 | End: 2024-07-01

## 2024-07-01 RX ORDER — MEPERIDINE HYDROCHLORIDE 25 MG/ML
12.5 INJECTION INTRAMUSCULAR; INTRAVENOUS; SUBCUTANEOUS ONCE
OUTPATIENT
Start: 2024-07-01 | End: 2024-07-01

## 2024-07-01 RX ORDER — PROCHLORPERAZINE EDISYLATE 5 MG/ML
5 INJECTION INTRAMUSCULAR; INTRAVENOUS ONCE AS NEEDED
OUTPATIENT
Start: 2024-07-01 | End: 2035-11-28

## 2024-07-01 RX ORDER — ONDANSETRON HYDROCHLORIDE 2 MG/ML
4 INJECTION, SOLUTION INTRAVENOUS ONCE
OUTPATIENT
Start: 2024-07-01 | End: 2024-07-01

## 2024-07-01 RX ORDER — HYDROMORPHONE HYDROCHLORIDE 1 MG/ML
0.2 INJECTION, SOLUTION INTRAMUSCULAR; INTRAVENOUS; SUBCUTANEOUS EVERY 5 MIN PRN
OUTPATIENT
Start: 2024-07-01

## 2024-07-01 RX ORDER — OXYCODONE AND ACETAMINOPHEN 5; 325 MG/1; MG/1
2 TABLET ORAL ONCE
OUTPATIENT
Start: 2024-07-01 | End: 2024-07-01

## 2024-07-01 RX ORDER — IPRATROPIUM BROMIDE AND ALBUTEROL SULFATE 2.5; .5 MG/3ML; MG/3ML
3 SOLUTION RESPIRATORY (INHALATION) ONCE AS NEEDED
OUTPATIENT
Start: 2024-07-01 | End: 2035-11-28

## 2024-07-01 RX ORDER — HYDROMORPHONE HYDROCHLORIDE 1 MG/ML
0.5 INJECTION, SOLUTION INTRAMUSCULAR; INTRAVENOUS; SUBCUTANEOUS EVERY 5 MIN PRN
OUTPATIENT
Start: 2024-07-01

## 2024-07-01 RX ORDER — DIPHENHYDRAMINE HYDROCHLORIDE 50 MG/ML
25 INJECTION INTRAMUSCULAR; INTRAVENOUS ONCE AS NEEDED
OUTPATIENT
Start: 2024-07-01 | End: 2035-11-28

## 2024-07-02 ENCOUNTER — HOSPITAL ENCOUNTER (OUTPATIENT)
Facility: HOSPITAL | Age: 79
Discharge: HOME | End: 2024-07-02
Attending: OPHTHALMOLOGY | Admitting: OPHTHALMOLOGY
Payer: MEDICARE

## 2024-07-02 ENCOUNTER — ANESTHESIA (OUTPATIENT)
Dept: SURGERY | Facility: HOSPITAL | Age: 79
End: 2024-07-02
Payer: MEDICARE

## 2024-07-02 VITALS
WEIGHT: 139.81 LBS | DIASTOLIC BLOOD PRESSURE: 61 MMHG | SYSTOLIC BLOOD PRESSURE: 112 MMHG | RESPIRATION RATE: 20 BRPM | OXYGEN SATURATION: 95 % | TEMPERATURE: 98 F | BODY MASS INDEX: 28.24 KG/M2 | HEART RATE: 80 BPM

## 2024-07-02 DIAGNOSIS — H25.811 COMBINED FORM OF AGE-RELATED CATARACT, RIGHT EYE: Primary | ICD-10-CM

## 2024-07-02 PROCEDURE — 63600175 PHARM REV CODE 636 W HCPCS: Performed by: NURSE ANESTHETIST, CERTIFIED REGISTERED

## 2024-07-02 PROCEDURE — 37000009 HC ANESTHESIA EA ADD 15 MINS: Performed by: OPHTHALMOLOGY

## 2024-07-02 PROCEDURE — 25000003 PHARM REV CODE 250

## 2024-07-02 PROCEDURE — 37000008 HC ANESTHESIA 1ST 15 MINUTES: Performed by: OPHTHALMOLOGY

## 2024-07-02 PROCEDURE — 71000016 HC POSTOP RECOV ADDL HR: Performed by: OPHTHALMOLOGY

## 2024-07-02 PROCEDURE — 71000015 HC POSTOP RECOV 1ST HR: Performed by: OPHTHALMOLOGY

## 2024-07-02 PROCEDURE — 36000706: Performed by: OPHTHALMOLOGY

## 2024-07-02 PROCEDURE — 25000003 PHARM REV CODE 250: Performed by: NURSE ANESTHETIST, CERTIFIED REGISTERED

## 2024-07-02 PROCEDURE — 25000003 PHARM REV CODE 250: Performed by: SPECIALIST

## 2024-07-02 PROCEDURE — V2787 ASTIGMATISM-CORRECT FUNCTION: HCPCS | Performed by: OPHTHALMOLOGY

## 2024-07-02 PROCEDURE — 25000003 PHARM REV CODE 250: Performed by: OPHTHALMOLOGY

## 2024-07-02 PROCEDURE — 36000707: Performed by: OPHTHALMOLOGY

## 2024-07-02 PROCEDURE — 63600175 PHARM REV CODE 636 W HCPCS: Performed by: OPHTHALMOLOGY

## 2024-07-02 RX ORDER — DEXMEDETOMIDINE HYDROCHLORIDE 100 UG/ML
INJECTION, SOLUTION INTRAVENOUS
Status: DISCONTINUED | OUTPATIENT
Start: 2024-07-02 | End: 2024-07-02

## 2024-07-02 RX ORDER — CYCLOPENTOLATE HYDROCHLORIDE 10 MG/ML
1 SOLUTION/ DROPS OPHTHALMIC
Status: COMPLETED | OUTPATIENT
Start: 2024-07-02 | End: 2024-07-02

## 2024-07-02 RX ORDER — TROPICAMIDE 10 MG/ML
1 SOLUTION/ DROPS OPHTHALMIC
Status: COMPLETED | OUTPATIENT
Start: 2024-07-02 | End: 2024-07-02

## 2024-07-02 RX ORDER — DIAZEPAM 5 MG/1
5 TABLET ORAL EVERY 6 HOURS PRN
Status: DISCONTINUED | OUTPATIENT
Start: 2024-07-02 | End: 2024-07-02 | Stop reason: HOSPADM

## 2024-07-02 RX ORDER — MOXIFLOXACIN 5 MG/ML
SOLUTION/ DROPS OPHTHALMIC
Status: DISCONTINUED | OUTPATIENT
Start: 2024-07-02 | End: 2024-07-02 | Stop reason: HOSPADM

## 2024-07-02 RX ORDER — SODIUM CHLORIDE 9 MG/ML
0-999 INJECTION, SOLUTION INTRAVENOUS CONTINUOUS
Status: DISCONTINUED | OUTPATIENT
Start: 2024-07-02 | End: 2024-07-02 | Stop reason: HOSPADM

## 2024-07-02 RX ORDER — EPINEPHRINE 1 MG/ML
INJECTION INTRAMUSCULAR; INTRAVENOUS; SUBCUTANEOUS
Status: DISCONTINUED | OUTPATIENT
Start: 2024-07-02 | End: 2024-07-02 | Stop reason: HOSPADM

## 2024-07-02 RX ORDER — IBUPROFEN 400 MG/1
400 TABLET ORAL ONCE
OUTPATIENT
Start: 2024-07-02

## 2024-07-02 RX ORDER — FLURBIPROFEN SODIUM 0.3 MG/ML
SOLUTION/ DROPS OPHTHALMIC
Status: DISCONTINUED | OUTPATIENT
Start: 2024-07-02 | End: 2024-07-02 | Stop reason: HOSPADM

## 2024-07-02 RX ORDER — PROPARACAINE HYDROCHLORIDE 5 MG/ML
1 SOLUTION/ DROPS OPHTHALMIC ONCE
Status: COMPLETED | OUTPATIENT
Start: 2024-07-02 | End: 2024-07-02

## 2024-07-02 RX ORDER — PHENYLEPHRINE HYDROCHLORIDE 25 MG/ML
1 SOLUTION/ DROPS OPHTHALMIC
Status: COMPLETED | OUTPATIENT
Start: 2024-07-02 | End: 2024-07-02

## 2024-07-02 RX ORDER — FENTANYL CITRATE 50 UG/ML
INJECTION, SOLUTION INTRAMUSCULAR; INTRAVENOUS
Status: DISCONTINUED | OUTPATIENT
Start: 2024-07-02 | End: 2024-07-02

## 2024-07-02 RX ORDER — MIDAZOLAM HYDROCHLORIDE 1 MG/ML
INJECTION INTRAMUSCULAR; INTRAVENOUS
Status: DISCONTINUED | OUTPATIENT
Start: 2024-07-02 | End: 2024-07-02

## 2024-07-02 RX ORDER — PREDNISOLONE ACETATE 10 MG/ML
SUSPENSION/ DROPS OPHTHALMIC
Status: DISCONTINUED | OUTPATIENT
Start: 2024-07-02 | End: 2024-07-02 | Stop reason: HOSPADM

## 2024-07-02 RX ORDER — ONDANSETRON HYDROCHLORIDE 2 MG/ML
INJECTION, SOLUTION INTRAVENOUS
Status: DISCONTINUED | OUTPATIENT
Start: 2024-07-02 | End: 2024-07-02

## 2024-07-02 RX ADMIN — DEXMEDETOMIDINE 10 MCG: 200 INJECTION, SOLUTION INTRAVENOUS at 10:07

## 2024-07-02 RX ADMIN — TROPICAMIDE 1 DROP: 10 SOLUTION/ DROPS OPHTHALMIC at 07:07

## 2024-07-02 RX ADMIN — CYCLOPENTOLATE HYDROCHLORIDE 1 DROP: 10 SOLUTION/ DROPS OPHTHALMIC at 07:07

## 2024-07-02 RX ADMIN — ONDANSETRON 4 MG: 2 INJECTION INTRAMUSCULAR; INTRAVENOUS at 10:07

## 2024-07-02 RX ADMIN — MIDAZOLAM HYDROCHLORIDE 1 MG: 1 INJECTION, SOLUTION INTRAMUSCULAR; INTRAVENOUS at 10:07

## 2024-07-02 RX ADMIN — PHENYLEPHRINE HYDROCHLORIDE 1 DROP: 25 SOLUTION/ DROPS OPHTHALMIC at 07:07

## 2024-07-02 RX ADMIN — FENTANYL CITRATE 25 MCG: 50 INJECTION INTRAMUSCULAR; INTRAVENOUS at 10:07

## 2024-07-02 RX ADMIN — PROPARACAINE HYDROCHLORIDE 1 DROP: 5 SOLUTION/ DROPS OPHTHALMIC at 07:07

## 2024-07-02 RX ADMIN — FENTANYL CITRATE 50 MCG: 50 INJECTION INTRAMUSCULAR; INTRAVENOUS at 10:07

## 2024-07-02 RX ADMIN — SODIUM CHLORIDE 50 ML/HR: 9 INJECTION, SOLUTION INTRAVENOUS at 08:07

## 2024-07-02 RX ADMIN — SODIUM CHLORIDE: 9 INJECTION, SOLUTION INTRAVENOUS at 09:07

## 2024-07-02 NOTE — TRANSFER OF CARE
Anesthesia Transfer of Care Note    Patient: Mikala Thompson    Procedure(s) Performed: Procedure(s) (LRB):  PHACOEMULSIFICATION, CATARACT, WITH IOL INSERTION (Right)    Patient location: PACU    Anesthesia Type: MAC    Transport from OR: Transported from OR on room air with adequate spontaneous ventilation    Post pain: adequate analgesia    Post assessment: no apparent anesthetic complications    Post vital signs: stable    Level of consciousness: awake and alert    Nausea/Vomiting: no nausea/vomiting    Complications: none    Transfer of care protocol was followed      Last vitals: Visit Vitals  /72   Pulse 82   Temp 36.3 °C (97.3 °F) (Temporal)   Resp 20   Wt 63.4 kg (139 lb 12.8 oz)   SpO2 100%   Breastfeeding No   BMI 28.24 kg/m²

## 2024-07-02 NOTE — DISCHARGE SUMMARY
Discharge Summary  Ophthalmology Service    Admit Date: 7/2/2024     Discharge Date: 7/2/2024     Attending Physician: Uriah Mcarthur MD     Discharge Physician: Same    Discharged Condition: Good    Reason for Admission: Combined forms of age-related cataract of right eye [H25.811]     Treatments/Procedures: Procedure(s) (LRB):  PHACOEMULSIFICATION, CATARACT, WITH IOL INSERTION (Right) (see dictated report for details)    Hospital Course: Stable    Consults: None    Significant Diagnostic Studies: None     Disposition: Home    Patient Instructions:   - Resume same diet as prior to surgery  - Limit rubbing/touching eyes. Keep patch on until tomorrow appointment except when putting drops in  - No strenuous activity, heavy lifting, bending below waist for one week  - Bring drops to tomorrow's appointment and start using pred forte, moxifloxacin, and ketorolac four times daily  - Follow-up in clinic tomorrow for post-op day 1    Patient Instructions:   Current Discharge Medication List        CONTINUE these medications which have NOT CHANGED    Details   amLODIPine (NORVASC) 5 MG tablet See Instructions, Take 1 tablet by mouth once daily, # 90 tab(s), 7 Refill(s), Pharmacy: [id number], 150, cm, Height/Length Dosing, 02/01/22 10:09:00 CST, 64.75, kg, Weight Dosing, 02/01/22 10:09:00 CST  Qty: 90 tablet, Refills: 1    Comments: .  Associated Diagnoses: Healthcare maintenance      levothyroxine (SYNTHROID) 137 MCG Tab tablet Take 0.5 tablets (68.5 mcg total) by mouth once daily.  Qty: 45 tablet, Refills: 1    Associated Diagnoses: Hashimoto's thyroiditis      losartan (COZAAR) 50 MG tablet Take 1 tablet (50 mg total) by mouth once daily.  Qty: 90 tablet, Refills: 2    Comments: .  Associated Diagnoses: Hypertension, unspecified type      pravastatin (PRAVACHOL) 40 MG tablet See Instructions, Take 1 tablet by mouth once daily, # 90 tab(s), 5 Refill(s), Pharmacy: [id number], 150, cm, Height/Length  Dosing, 02/01/22 10:09:00 CST, 64.75, kg, Weight Dosing, 02/01/22 10:09:00 CST  Qty: 90 tablet, Refills: 1    Associated Diagnoses: Healthcare maintenance             No discharge procedures on file.      Hernando Gil MD

## 2024-07-02 NOTE — H&P
Pre-operative History and Physical  Ophthalmology Service    CC: Blurry vision    HPI:   Patient is a 78 y.o. female presents with cataracts requiring surgery as noted in the most recent ophthalmology progress note.    Past Medical History:   Diagnosis Date    Amblyopia     Benign paroxysmal vertigo, bilateral     Cataract     Glaucoma     Hyperlipidemia     Hypertension     Hypothyroidism     Hypothyroidism, unspecified     Osteoarthritis     Strabismus        Past Surgical History:   Procedure Laterality Date    CATARACT EXTRACTION      EYE MUSCLE SURGERY Left 01/26/2024    strabismus sx OS - Dr. Kike Woodard    EYE SURGERY  10/2020    Cataracts    FRACTURE SURGERY Left 06/2020    Wrist    TUBAL LIGATION  02/1980       Family History   Problem Relation Name Age of Onset    Cancer Maternal Uncle Uncle Luis Eduardo     Diabetes Maternal Grandmother Annie Pimentel        Social History     Socioeconomic History    Marital status: Single   Tobacco Use    Smoking status: Never     Passive exposure: Never    Smokeless tobacco: Never   Substance and Sexual Activity    Alcohol use: Not Currently    Drug use: Never    Sexual activity: Not Currently     Partners: Male     Birth control/protection: Abstinence, Post-menopausal     Social Determinants of Health     Financial Resource Strain: Low Risk  (6/3/2024)    Overall Financial Resource Strain (CARDIA)     Difficulty of Paying Living Expenses: Not very hard   Food Insecurity: No Food Insecurity (6/3/2024)    Hunger Vital Sign     Worried About Running Out of Food in the Last Year: Never true     Ran Out of Food in the Last Year: Never true   Transportation Needs: No Transportation Needs (6/3/2024)    TRANSPORTATION NEEDS     Transportation : No   Physical Activity: Inactive (6/3/2024)    Exercise Vital Sign     Days of Exercise per Week: 0 days     Minutes of Exercise per Session: 0 min   Stress: No Stress Concern Present (6/3/2024)    [employer] -  Occupational Stress Questionnaire     Feeling of Stress : Not at all   Housing Stability: Low Risk  (6/3/2024)    Housing Stability Vital Sign     Unable to Pay for Housing in the Last Year: No     Homeless in the Last Year: No       Current Facility-Administered Medications   Medication Dose Route Frequency Provider Last Rate Last Admin    cyclopentolate 1% ophthalmic solution 1 drop  1 drop Right Eye Q5 Min Hernando Gil MD   1 drop at 07/02/24 0741    phenylephrine HCL 2.5% ophthalmic solution 1 drop  1 drop Right Eye Q5 Min Hernando Gil MD   1 drop at 07/02/24 0741    tropicamide 1% ophthalmic solution 1 drop  1 drop Right Eye Q5 Min Hernando Gil MD   1 drop at 07/02/24 0741       Review of patient's allergies indicates:  No Known Allergies      Review of systems:  Gen: denies fevers, chills, nausea, vomitting, weight changes  HEENT: Denies discharge or coughing/sneezing. +blurry vision   Resp: Denies SOB  CV: Denies CP or palpitations  Abd: Denies tenderness, diarrhea, constipation, nausea  Ext:  Denies pain or edema    Physical Exam  BP: Vital signs stable  General: No apparent distress  HEENT: Normocephalic, atraumatic  Lungs: Adequate respirations, no respiratory distress  Heart: Pulses intact  Abdomen: Soft, no distention  Rectal/pelvic: Deferred    Assessment  Patient is a 78 y.o. female with cataracts, right eye   - Risks/benefits/alternatives of the procedure including, but not limited to scarring, bleeding, infection, loss or decreased vision, and/or need for possible repeat surgery discussed with the patient and/or family, and Informed consent obtained prior to surgery and the patient/family voiced good understanding, witnessed, and placed in chart.  - Will proceed with cataract surgery, right eye. +12.5D [id number] lens @ 50 degrees   - Plan for local/MAC

## 2024-07-02 NOTE — ANESTHESIA POSTPROCEDURE EVALUATION
Anesthesia Post Evaluation    Patient: Mikala Thompson    Procedure(s) Performed: Procedure(s) (LRB):  PHACOEMULSIFICATION, CATARACT, WITH IOL INSERTION (Right)    Final Anesthesia Type: MAC      Patient location during evaluation: OPS  Patient participation: Yes- Able to Participate  Level of consciousness: awake and alert  Post-procedure vital signs: reviewed and stable  Pain management: adequate  Airway patency: patent    PONV status at discharge: No PONV  Anesthetic complications: no      Cardiovascular status: hemodynamically stable  Respiratory status: spontaneous ventilation, unassisted and room air  Hydration status: euvolemic  Follow-up not needed.                    No case tracking events are documented in the log.      Pain/Mary Score: No data recorded

## 2024-07-02 NOTE — BRIEF OP NOTE
Brief Operative Note  Ophthalmology Service    Date of Procedure: 7/2/2024     Attending Physician: Uriah Mcarthur MD    Resident: Hernando Gil MD    Pre-Operative Diagnosis: Combined forms of age-related cataract of right eye [H25.811]     Post-Operative Diagnosis: Same as pre-operative diagnosis    Treatments/Procedures:   Procedure(s) (LRB):  PHACOEMULSIFICATION, CATARACT, WITH IOL INSERTION (Right)    Intraoperative Findings: None    Anesthesia: Local/mac    Complications: None    Estimated Blood Loss: < 5 cc    Specimens: None    -------------------------------------------------------------  Full dictated Operative Report to follow.  -------------------------------------------------------------

## 2024-07-02 NOTE — DISCHARGE INSTRUCTIONS
Instill eye drops 3 more times today ( on arrival home, @ evening meal and at bedtime.)  Starting tomorrow, you will need to use eye drops 4 times daily.  Shake the pink top before instilling eye drops. Instill eye drops about 3-5 minutes apart when instilling them.  · Keep follow up appointment tomorrow at the [facility] clinic   +++Bring drops with you to clinic     ` Resume home medications unless otherwise instructed by your doctor.    · No bending, lifting (nothing over 10#),  no stooping or straining until cleared by MD.  No swimming for 2 weeks.    · Keep patch on until follow up appointment and while asleep at home to protect your eye.    · May take Tylenol or Ibuprofen for pain or discomfort if no allergies or contraindications.      ` No driving or consuming alcohol for 24 hours after anesthesia.    · Notify MD if you experience:    · Pain that is unrelieved by over the counter medicines    · if you feel increased pressure in your eye or sharp pain in the eye    · you have excessive, colored, or thick drainage coming from eye    · you see curtain-like darkening in the eye, flashes of light, or other sudden vision changes    · if you have any nausea or vomiting    · fever above 100.4F    · The clinics number is [phone]. If it is after business hours or emergency call [phone].  Thanks for choosing [facility]! Have a smooth recovery!

## 2024-07-03 ENCOUNTER — OFFICE VISIT (OUTPATIENT)
Dept: OPHTHALMOLOGY | Facility: CLINIC | Age: 79
End: 2024-07-03
Payer: MEDICARE

## 2024-07-03 VITALS — BODY MASS INDEX: 28.17 KG/M2 | WEIGHT: 139.75 LBS | HEIGHT: 59 IN

## 2024-07-03 DIAGNOSIS — Z98.890 POST-OPERATIVE STATE: Primary | ICD-10-CM

## 2024-07-03 PROCEDURE — 99214 OFFICE O/P EST MOD 30 MIN: CPT | Mod: PBBFAC,PN

## 2024-07-03 RX ORDER — PREDNISOLONE ACETATE 10 MG/ML
1 SUSPENSION/ DROPS OPHTHALMIC 4 TIMES DAILY
Qty: 5 ML | Refills: 1 | Status: SHIPPED | OUTPATIENT
Start: 2024-07-03 | End: 2024-07-23

## 2024-07-03 RX ORDER — KETOROLAC TROMETHAMINE 5 MG/ML
1 SOLUTION OPHTHALMIC 4 TIMES DAILY
Qty: 5 ML | Refills: 1 | Status: SHIPPED | OUTPATIENT
Start: 2024-07-03 | End: 2025-07-03

## 2024-07-03 RX ORDER — MOXIFLOXACIN 5 MG/ML
1 SOLUTION/ DROPS OPHTHALMIC 3 TIMES DAILY
COMMUNITY
End: 2024-07-03

## 2024-07-03 RX ORDER — FLURBIPROFEN SODIUM 0.3 MG/ML
1 SOLUTION/ DROPS OPHTHALMIC ONCE
COMMUNITY

## 2024-07-03 RX ORDER — MOXIFLOXACIN 5 MG/ML
1 SOLUTION/ DROPS OPHTHALMIC 4 TIMES DAILY
Qty: 3 ML | Refills: 1 | Status: SHIPPED | OUTPATIENT
Start: 2024-07-03 | End: 2024-07-23

## 2024-07-03 RX ORDER — PREDNISOLONE ACETATE 10 MG/ML
1 SUSPENSION/ DROPS OPHTHALMIC 4 TIMES DAILY
COMMUNITY
End: 2024-07-03

## 2024-07-03 NOTE — PROGRESS NOTES
HPI     Post-op Evaluation     Additional comments: POD 1            Comments    Combined forms of age-related cataract of right eye          Last edited by Lena Patricia LPN on 7/3/2024  7:51 AM.            Assessment /Plan     1. Pseudophakia, OD  - DOS: 7/2/24  - VA 20/30+2, IOP 16  - Doing well, little negative  - No evidence of infection  - Continue PredForte QID, Ketorolac QID, and Vigamox QID  - Nocturnal shield for 1 week, no heavy lifting, bending, stooping, or straining for 1 week  - No swimming for 2 weeks  - Return immediately for pain, redness or loss of vision (informed of our emergency department which is open 24/7).  - RD and endophthalmitis precautions given  - Written instructions provided to patient upon discharge  - RTC general 1 week

## 2024-07-04 ENCOUNTER — PATIENT MESSAGE (OUTPATIENT)
Dept: ADMINISTRATIVE | Facility: OTHER | Age: 79
End: 2024-07-04
Payer: MEDICARE

## 2024-07-09 ENCOUNTER — OFFICE VISIT (OUTPATIENT)
Dept: OPHTHALMOLOGY | Facility: CLINIC | Age: 79
End: 2024-07-09
Payer: MEDICARE

## 2024-07-09 VITALS — BODY MASS INDEX: 28.17 KG/M2 | WEIGHT: 139.75 LBS | HEIGHT: 59 IN

## 2024-07-09 DIAGNOSIS — Z98.890 POST-OPERATIVE STATE: Primary | ICD-10-CM

## 2024-07-09 PROCEDURE — 99213 OFFICE O/P EST LOW 20 MIN: CPT | Mod: PBBFAC,PN

## 2024-07-09 NOTE — PROGRESS NOTES
HPI     Post-op Evaluation     Additional comments: PHACOEMULSIFICATION, CATARACT, WITH IOL INSERTION   (right) 7/02/2024           Comments    POW1 PHACOEMULSIFICATION, CATARACT, WITH IOL INSERTION (right) 7/02/2024   RTC general 1 week               Last edited by Liseth Johnson MA on 7/9/2024 10:36 AM.            Assessment /Plan     For exam results, see Encounter Report.    There are no diagnoses linked to this encounter.  HPI     Post-op Evaluation     Additional comments: PHACOEMULSIFICATION, CATARACT, WITH IOL INSERTION   (right) 7/02/2024           Comments    POW1 PHACOEMULSIFICATION, CATARACT, WITH IOL INSERTION (right) 7/02/2024   RTC general 1 week               Last edited by Liseth Johnson MA on 7/9/2024 10:36 AM.            Assessment /Plan     1. Pseudophakia, right eye  - s/p phaco/IOL OD (DOS: 7/2/24)   - POW#1: Doing well, wound Candy negative, IOP controlled, pt happy with vision  - Stop the following:   - Vigamox   - Maxitrol ointment   - Eye shield   - Activity restrictions  - Taper Pred Forte weekly as follows: TID > BID > daily > stop  - Continue Acular (ketorolac) QID until empty  - Endophthalmitis and RD precautions reviewed    RTC 3-4 wks for POM1 with MRx/DFE

## 2024-07-11 ENCOUNTER — OFFICE VISIT (OUTPATIENT)
Dept: ORTHOPEDICS | Facility: CLINIC | Age: 79
End: 2024-07-11
Payer: MEDICARE

## 2024-07-11 VITALS
DIASTOLIC BLOOD PRESSURE: 62 MMHG | HEIGHT: 59 IN | BODY MASS INDEX: 28.43 KG/M2 | HEART RATE: 75 BPM | WEIGHT: 141 LBS | SYSTOLIC BLOOD PRESSURE: 116 MMHG | TEMPERATURE: 98 F

## 2024-07-11 DIAGNOSIS — M70.50 PES ANSERINE BURSITIS: Primary | ICD-10-CM

## 2024-07-11 PROCEDURE — 99214 OFFICE O/P EST MOD 30 MIN: CPT | Mod: PBBFAC

## 2024-07-11 RX ORDER — TRIAMCINOLONE ACETONIDE 40 MG/ML
40 INJECTION, SUSPENSION INTRA-ARTICULAR; INTRAMUSCULAR ONCE
Status: COMPLETED | OUTPATIENT
Start: 2024-07-11 | End: 2024-07-11

## 2024-07-11 RX ORDER — LIDOCAINE HYDROCHLORIDE 10 MG/ML
5 INJECTION, SOLUTION EPIDURAL; INFILTRATION; INTRACAUDAL; PERINEURAL
Status: COMPLETED | OUTPATIENT
Start: 2024-07-11 | End: 2024-07-11

## 2024-07-11 RX ADMIN — LIDOCAINE HYDROCHLORIDE 50 MG: 10 INJECTION, SOLUTION EPIDURAL; INFILTRATION; INTRACAUDAL; PERINEURAL at 10:07

## 2024-07-11 RX ADMIN — TRIAMCINOLONE ACETONIDE 40 MG: 40 INJECTION, SUSPENSION INTRA-ARTICULAR; INTRAMUSCULAR at 10:07

## 2024-07-11 NOTE — PROGRESS NOTES
"Subjective:    Patient ID: Mikala Thompson is a 78 y.o. female  who presented to [facility] for follow up.      Chief Complaint: Pain of the Left Knee and Pain of the Right Knee      History of Present Illness:  Mikala Thompson with a history of bilateral knee osteoarthritis presents to the clinic complaining of acute atraumatic bilateral knee pain onset 1-1/2 months ago. She was last seen by me on June 26, 2024 when she received a right inferior medial genicular nerve block which reduced her right knee pain from a 9/10 to a 1/10. She states that she her right knee pain worsened to a 3/10 one week ago at the inferomedial. Left knee pain is 5-6/10 located at the inferomedial to the patella. Her knee pain worsens with prolonged standing and walking, and improves with ice pack and tylenol. Also admits to persistent sweling on her bilateral pes anserine area. Treatment to date:  Right inferomedial genicular nerve block (June 26, 2024), compression with ace wrap, bracing, topical analgesics, oral analgesics, CSI (last performed on 4/2023 with relief for 2-3 months), VSI (3rd bilateral knee Euflexxa on 6/12/2024), PT (last year for a total of 6 weeks, 2 sessions per week, with no relief), and home exercises (daily for the past 2 weeks).  Imaging to date: Radiograph and bilateral LE US study on 6/5/24 which revealed minimal fluid in the supraptellar recess.  Expectation: Pain relief. Occupation: currently unemployed. PCP is Dr. Nahid Lazcano.     Knee Review of Systems:  Swelling?  yes  Instability?  no  Mechanical sx?  no  <30 min AM stiffness? no  Limited ROM? no  Fever/Chills? no       Objective:      Physical Exam:    /62   Pulse 75   Temp 97.6 °F (36.4 °C)   Ht 4' 11" (1.499 m)   Wt 64 kg (141 lb)   BMI 28.48 kg/m²     Ortho/SPM Exam    Appearance:  Normal gait/station  FWB  Alignment: Left: normal Right: normal   Soft tissue swelling: Left: no Right: " no  Effusion: Left:  Negative Right: Negative  Erythema: Left no Right: no  Ecchymosis: Left: no Right: no  Atrophy: Left: no Right: no    Palpation:  Knee Tenderness: Left: pes asnerine Right: pes anserine    Range of motion:  Flexion (140): Left:  140 Right: 140  Extension (0): Left: 0 Right: 0    Strength:  Extension: Left 5/5  Pain: no     Right 5/5 Pain: no  Flexion: Left 5/5 Pain: no Right   5/5 Pain: no        Special Tests:  Ballotable Effusion:Left: Negative Right: Negative   Fluid Wave: Left: Negative Right: Negative   Crepitus: Left: Negative Right: Negative   Patellar grind test: Left: Negative  Right: Negative  Apprehension test: Left: Negative Right: Negative   Varus: @ 0, Left Negative Right: Negative.  @ 30, Left Negative  Right Negative   Valgus: @ 0, Left Negative Right: Negative.  @ 30, Left Negative  Right Negative  Lachman: Left: Negative Right: Negative   Ant Drawer: Left: Negative Right: Negative   Posterior Drawer: Left: Negative Right: Negative   Dial Test: Left: Not performed Right: Not performed   Payam: Left: Negative Right: Negative   Apley's: Left: Not performed Right: Not performed  Thessaly's: Left: Not performed Right: Not performed   Noble Compression: Left: Not performed Right: Not performed   Marcial: Left: Not performed Right: Not performed       General appearance: NAD  Peripheral pulses: normal bilaterally   Reflexes: Left: normal Right normal   Sensation: normal    Labs:  Last A1c: The patient doesn't have any registry metric data available     Imaging:   Previous images reviewed.  X-rays ordered and performed today: no  My Interpretation of bilateral knee radiograph from 5/1/24:  Left kl grade 2 and right kl grade 4 degenerative changes noted.      Assessment:        Encounter Diagnoses   Code Name Primary?    M70.50 Pes anserine bursitis Yes        Plan:    Dx:  Bilateral pes anserine bursitis  Treatment Plan: Discussed with patient diagnosis, prognosis, and treatment  recommendations. Education provided.  She had a right inferomedial genicular nerve block performed last visit in June which reduced her pain from a 9/10 to a 1/10. Will perform a left CSI to the left pes anserine bursa. Discussed conservative management including avoiding aggravating activities, activity modification, oral/topical analgesics, cold and hot therapy, HEP, formal PT, and to follow up with us in 2 weeks.  Consider a right CSI to the pes anserine bursa left knee pain has resolved today's injection.  Imaging: prior radiological studies independently reviewed; discussed with patient; agree with radiologist interpretation.   Weight Management: is paramount. Maintain healthy weight of a BMI of <24.9..   Procedure: Discussed CSI/as treatment options; left CSI to the pes anserine bursa.  Postprocedure pain:  0/10..  Activity: Activity as tolerated; HEP to include aerobic conditioning and strength training with non-painful activity. ROM/STG exercises. Proper footware; assistive devises to avoid limping.   Therapy:  Home exercise program  Medication: START over-the-counter acetaminophen (Tylenol 1000 mg three times per day as needed)  CONTINUE over-the-counter NSAIDs (ibuprofen 200mg three tablets three times a day as needed). Please see your primary care physician for further refills.  RTC: 2 weeks.         Intermediate Joint Aspiration/Injection    Date/Time: 7/11/2024 8:30 AM    Performed by: Zaid Art MD  Authorized by: Zaid Art MD    Consent Done?:  Yes (Written)  Indications:  Diagnostic evaluation and pain  Site marked: The procedure site was marked    Timeout: Prior to procedure the correct patient, procedure, and site was verified      Location: Left pes anserine bursa.  Prep: Patient was prepped and draped in usual sterile fashion    Approach:  Anteromedial  Patient tolerance:  Patient tolerated the procedure well with no immediate complications       Additional Comments: Staff: Norma Hinds  MD    Risks:  Possible complications with the injection include bleeding, infection (.01%), tendon rupture, steroid flare, fat pad or soft tissue atrophy, skin depigmentation, allergic reaction to medications and vasovagal response. (steroid flare treatment is rest, ice, NSAIDs and resolves in 24-36 hours.)    Consent:  No absolute contraindications (cellulitis overlying joint, infection, lack of informed consent, allergy to injection medication, AVN protein or egg allergy for sodium hyaluronate, or history of steroid flare) or relative contraindications (uncontrolled DM2 A1c>10, coagulopathy, INR > 3.5, previous joint replacement or history of AVN).        Description:  The patient was prepped in normal sterile fashion use of chlorhexidine scrub and the appropriate and anatomic landmarks were identified with ultrasound.  Contents of syringe included: 5cc of 1% of lidocaine with 40mg of Kenalog, and 5 cc of 1% of lidocaine.    Post Procedure: Patient alert, and moving all extremities. ROM improved, pain decreased.  Good peripheral pulses, no signs of vascular compromise and range of motion intact.  Aftercare instructions were given to patient at time of discharge.  Relative rest for 3 days-avoiding excess activity.  Place ice on the area for 15 minutes every 4-6 hours. Patient may take Tylenol a 1000 mg b.i.d. or ibuprofen 600 mg t.i.d. for the next 3-4 days if not on medication already and safe to take pending co-morbidities.  Protect the area for the next 1-8 hours if anesthetic was used.  Avoid excessive activity for the next 3-4 weeks.  ER precautions given for fever, severe joint pain or allergic reaction or other new symptoms related to the joint injection.           This note is dictated using the M*Modal Fluency Direct word recognition program. There are word recognition mistakes that are occasionally missed on review.    Zaid Art D.O.  Sports Medicine Fellow

## 2024-07-24 ENCOUNTER — OFFICE VISIT (OUTPATIENT)
Dept: ORTHOPEDICS | Facility: CLINIC | Age: 79
End: 2024-07-24
Payer: MEDICARE

## 2024-07-24 VITALS
TEMPERATURE: 98 F | WEIGHT: 140 LBS | HEART RATE: 78 BPM | DIASTOLIC BLOOD PRESSURE: 66 MMHG | BODY MASS INDEX: 28.22 KG/M2 | HEIGHT: 59 IN | SYSTOLIC BLOOD PRESSURE: 103 MMHG

## 2024-07-24 DIAGNOSIS — M70.50 PES ANSERINE BURSITIS: Primary | ICD-10-CM

## 2024-07-24 PROCEDURE — 99213 OFFICE O/P EST LOW 20 MIN: CPT | Mod: PBBFAC

## 2024-07-24 RX ORDER — TRIAMCINOLONE ACETONIDE 40 MG/ML
40 INJECTION, SUSPENSION INTRA-ARTICULAR; INTRAMUSCULAR
Status: COMPLETED | OUTPATIENT
Start: 2024-07-24 | End: 2024-07-24

## 2024-07-24 RX ORDER — LIDOCAINE HYDROCHLORIDE 10 MG/ML
5 INJECTION, SOLUTION EPIDURAL; INFILTRATION; INTRACAUDAL; PERINEURAL
Status: COMPLETED | OUTPATIENT
Start: 2024-07-24 | End: 2024-07-24

## 2024-07-24 RX ADMIN — LIDOCAINE HYDROCHLORIDE 50 MG: 10 INJECTION, SOLUTION EPIDURAL; INFILTRATION; INTRACAUDAL; PERINEURAL at 11:07

## 2024-07-24 RX ADMIN — TRIAMCINOLONE ACETONIDE 40 MG: 40 INJECTION, SUSPENSION INTRA-ARTICULAR; INTRAMUSCULAR at 11:07

## 2024-07-24 NOTE — PROGRESS NOTES
"Subjective:    Patient ID: Mikala Thompson is a 78 y.o. female  who presented to [facility] for follow up.      Chief Complaint: Pain of the Right Knee      History of Present Illness:  Mikala Thompson with a history of bilateral knee osteoarthritis presents to the clinic complaining of acute atraumatic bilateral knee pain onset months ago.  She was seen by July 11, 2024 received a left pes anserine CSI which resolved her pain.  Her left knee pain has improved by 90% since she has received her pes anserinus CSI from her last visit.  Today she admits to 2/10 pain her left pes anserine bursa.  Right knee pain is a 5/10 located on her right his anserine bursa that is worse with ambulation and prolonged standing, and improves with rest and oral analgesics.Treatment to date:  Right inferomedial genicular nerve block (June 26, 2024-provided her 1-2 weeks of pain relief), compression with ace wrap, bracing, topical analgesics, oral analgesics, knee CSI (last performed on 4/2023 with relief for 2-3 months), right pes anserine bursa CSI (last performed on June 11, 2024-which provided her 90% of pain relief),  VSI (3rd bilateral knee Euflexxa on 6/12/2024), PT (last year for a total of 6 weeks, 2 sessions per week, with no relief), and home exercises (daily for the past 2 weeks).  Imaging to date: Radiograph and bilateral LE US study on 6/5/24 which revealed minimal fluid in the supraptellar recess.  Expectation: Pain relief. Occupation: currently unemployed. PCP is Dr. Nahid Lazcano.     Knee Review of Systems:  Swelling?  yes  Instability?  no  Mechanical sx?  no  <30 min AM stiffness? no  Limited ROM? no  Fever/Chills? no       Objective:      Physical Exam:    /66   Pulse 78   Temp 97.6 °F (36.4 °C)   Ht 4' 11" (1.499 m)   Wt 63.5 kg (139 lb 15.9 oz)   BMI 28.27 kg/m²     Ortho/SPM Exam    Appearance:  Normal gait/station  FWB  Alignment: Left: normal Right: " normal   Soft tissue swelling: Left: no Right: no  Effusion: Left:  Negative Right: Negative  Erythema: Left no Right: no  Ecchymosis: Left: no Right: no  Atrophy: Left: no Right: no    Palpation:  Knee Tenderness: Left: pes anserine bursa Right: pes anserine bursa    Range of motion:  Flexion (140): Left:  120 Right: 120  Extension (0): Left: 0 Right: 0    Strength:  Extension: Left 5/5  Pain: no     Right 5/5 Pain: no  Flexion: Left 5/5 Pain: no Right   5/5 Pain: no        Special Tests:  Ballotable Effusion:Left: Negative Right: Negative   Fluid Wave: Left: Negative Right: Negative   Crepitus: Left: Negative Right: Negative   Patellar grind test: Left: Negative  Right: Negative  Apprehension test: Left: Negative Right: Negative   Varus: @ 0, Left Negative Right: Negative.  @ 30, Left Negative  Right Negative   Valgus: @ 0, Left Negative Right: Negative.  @ 30, Left Negative  Right Negative  Lachman: Left: Negative Right: Negative   Ant Drawer: Left: Negative Right: Negative   Posterior Drawer: Left: Negative Right: Negative   Dial Test: Left: Not performed Right: Not performed   Payam: Left: Negative Right: Negative   Apley's: Left: Not performed Right: Not performed  Thessaly's: Left: Not performed Right: Not performed   Noble Compression: Left: Not performed Right: Not performed   Marcial: Left: Not performed Right: Not performed       General appearance: NAD  Peripheral pulses: normal bilaterally   Reflexes: Left: normal Right normal   Sensation: normal    Labs:  Last A1c: The patient doesn't have any registry metric data available     Imaging:   Previous images reviewed.  X-rays ordered and performed today: no      My Interpretation of bilateral knee radiograph from 5/1/24:  Left kl grade 2 and right kl grade 4 degenerative changes noted.     Assessment:        Encounter Diagnoses   Code Name Primary?    M70.50 Pes anserine bursitis Yes        Plan:      Dx:  Bilateral pes anserine bursitis,  exacerbation  Treatment Plan: Discussed with patient diagnosis, prognosis, and treatment recommendations. Education provided.    Performed a right his anserine bursa CSI today. Discussed conservative management including avoiding aggravating activities, activity modification, oral/topical analgesics, cold and hot therapy, HEP, formal PT, and to follow up with us as needed.  Imaging: prior radiological studies independently reviewed; discussed with patient; agree with radiologist interpretation.   Weight Management: is paramount. Recommend at least 10 pounds weight loss if your BMI is 25-29.9. A BMI of <24.9 may provide further relief..   Procedure: discussed CSI injections as treatment options; since conservative measures did not improve symptoms patient consented for CSI today.  Activity: Activity as tolerated; HEP to include aerobic conditioning and strength training with non-painful activity. ROM/STG exercises. Proper footware; assistive devises to avoid limping.   Therapy: declined formal PT  Medication: START over-the-counter acetaminophen (Tylenol 1000 mg three times per day as needed)  CONTINUE over-the-counter NSAIDs (ibuprofen 200mg three tablets three times a day as needed). Please see your primary care physician for further refills.  RTC: PRN; call if any issues.         Large Joint Aspiration/Injection: R anserine bursa    Date/Time: 7/24/2024 8:30 AM    Performed by: Zaid Art MD  Authorized by: Zaid Art MD    Consent Done?:  Yes (Written)  Indications:  Pain and diagnostic evaluation  Site marked: the procedure site was marked    Timeout: prior to procedure the correct patient, procedure, and site was verified    Prep: patient was prepped and draped in usual sterile fashion      Local anesthesia used?: Yes    Local anesthetic:  Topical anesthetic    Details:  Needle Size:  21 G  Approach:  Anteromedial  Location:  Knee  Site:  R anserine bursa  Patient tolerance:  Patient tolerated the procedure  well with no immediate complications     Staff: Norma Hinds MD    Risks:  Possible complications with the injection include bleeding, infection (.01%), tendon rupture, steroid flare, fat pad or soft tissue atrophy, skin depigmentation, allergic reaction to medications and vasovagal response. (steroid flare treatment is rest, ice, NSAIDs and resolves in 24-36 hours.)    Consent:  No absolute contraindications (cellulitis overlying joint, infection, lack of informed consent, allergy to injection medication, AVN protein or egg allergy for sodium hyaluronate, or history of steroid flare) or relative contraindications (uncontrolled DM2 A1c>10, coagulopathy, INR > 3.5, previous joint replacement or history of AVN).        Description:  The patient was prepped in normal sterile fashion use of chlorhexidine scrub and the appropriate and anatomic landmarks were identified with ultrasound.  Local anesthesia was used with 5cc 1% Lidocaine, and 1cc of kenalaog and 5cc of 1% lidocaine was then administered into the right pes anserine bursa.     Post Procedure: Patient alert, and moving all extremities. ROM improved, pain decreased.  Good peripheral pulses, no signs of vascular compromise and range of motion intact.  Aftercare instructions were given to patient at time of discharge.  Relative rest for 3 days-avoiding excess activity.  Place ice on the area for 15 minutes every 4-6 hours. Patient may take Tylenol a 1000 mg b.i.d. or ibuprofen 600 mg t.i.d. for the next 3-4 days if not on medication already and safe to take pending co-morbidities.  Protect the area for the next 1-8 hours if anesthetic was used.  Avoid excessive activity for the next 3-4 weeks.  ER precautions given for fever, severe joint pain or allergic reaction or other new symptoms related to the joint injection.             This note is dictated using the M*Modal Fluency Direct word recognition program. There are word recognition mistakes that are  occasionally missed on review.    Zaid Art D.O.  Sports Medicine Fellow

## 2024-07-26 DIAGNOSIS — I10 HYPERTENSION, UNSPECIFIED TYPE: ICD-10-CM

## 2024-07-27 NOTE — PROGRESS NOTES
Faculty Attestation: Mikala Thompson  was seen in [facility]. Discussed with Dr. Art at the time of the visit. History of Present Illness, Physical Exam, and Assessment and Plan reviewed. Treatment plan is reasonable and appropriate. Compliance with treatment recommendations is important.  Radiology images independently reviewed and agree with radiologist interpretation.  Procedure note reviewed. Patient tolerated procedure well.      Norma Hinds MD  Sports Medicine

## 2024-08-01 RX ORDER — LOSARTAN POTASSIUM 50 MG/1
50 TABLET ORAL DAILY
Qty: 90 TABLET | Refills: 2 | Status: SHIPPED | OUTPATIENT
Start: 2024-08-01 | End: 2025-04-28

## 2024-08-02 ENCOUNTER — OFFICE VISIT (OUTPATIENT)
Dept: OPHTHALMOLOGY | Facility: CLINIC | Age: 79
End: 2024-08-02
Payer: MEDICARE

## 2024-08-02 VITALS — HEIGHT: 59 IN | BODY MASS INDEX: 28.22 KG/M2 | WEIGHT: 140 LBS

## 2024-08-02 DIAGNOSIS — Z98.890 POST-OPERATIVE STATE: Primary | ICD-10-CM

## 2024-08-02 PROCEDURE — 99213 OFFICE O/P EST LOW 20 MIN: CPT | Mod: PBBFAC,PN

## 2024-08-02 NOTE — PROGRESS NOTES
HPI     Post-op Evaluation     Additional comments: POM1:           Comments    Post-operative state          Last edited by Deana Flores MA on 8/2/2024 11:11 AM.            Assessment /Plan     HPI     Post-op Evaluation     Additional comments: POM1:           Comments    Post-operative state          Last edited by Deana Flores MA on 8/2/2024 11:11 AM.            Assessment /Plan     For exam results, see Encounter Report.    There are no diagnoses linked to this encounter.  HPI     Post-op Evaluation     Additional comments: POM1:           Comments    Post-operative state          Last edited by Deana Flores MA on 8/2/2024 11:11 AM.            Assessment /Plan       ANCILLARY TESTING     OCT RNFL  8/2/24:  OD 76 thinning IN (unreliable unable to find cup)  OS 78 all green   2/29/24:   OD 81 thinning IN (unreliable unable to find cup)  OS 78 all green   8/31/2023:   OD: thinning nasally, unreliable   OS: unreliable due to blockage superiorly     HVF 24-2  - 7/19/22  OD: 2/10 FL 0% FP 0% FN, nonspecific scattered deficits  OS: 1/10 FL 4% FP 0% FN, full field  - 11/18/22  OD: FL 0/11, FP 5%, FN 0%, few central defects  OS: FL 9/11, FP 0%, FN 7%, enlarged blind spot/dense defect temporally     Steve 6/13/24  OD: 43.36/45.15x056, regular astigmatism total 1.79D  OS: 43.66/45.26x076, regular astigmatism total 1.6D       OCT MAC (08/02/2024)  OD: Normal Foveal Contour, No IRF/SRF  OS: Normal Foveal Contour, No IRF/SRF  Impression: No pathology noted, Stable        1. Pseudophakia, right eye  - s/p phaco/IOL OD (DOS: 7/2/24)   - POW#1: Doing well, wound Candy negative, IOP controlled, pt happy with vision  - Stop the following:   - Vigamox   - Maxitrol ointment   - Eye shield   - Activity restrictions  - Taper Pred Forte weekly as follows: TID > BID > daily > stop  - Continue Acular (ketorolac) QID until empty  - Endophthalmitis and RD precautions reviewed  - POM#1: doing well. IOP controlled. Happy with  vision. BCVA 20/25 // 20/30. Off all drops. 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 Does have hx of thyroid disease but not c/w restrictive disease.   - On further questioning she reports her left eye has always been weaker than her right eye. Esotropia started after cataract surgery OS.   - ~25PD ET in primary gaze stable. Alternating  - s/p strab surgery with palmer 1/26/24  - 2/29/24 doing well, has fu visits in next few weeks, Will cont to follow  - 6/13/24: Still has residual ET about 15PD      3. Open angle with borderline glaucoma findings, low risk, both eyes  - Based on CDR asymmetry  - CCT thin OS>OD  - Gonio open to CBB  - OCT RNFL 1/25/22: IN thinning OD, full OS  2/292/24 81 IN thinning, 78 all green   - HVF   7/2022 essentially full with nonspecific deficits OD (does not correlate with OCT thinning)  11/2022: full with nonspecific central defects right eye // left eye unreliable due to high FL  -2/29/24: still poor scan in OD due to tilting, OS full octn. Iop remains wnl. Very small cups ou, Will stop OCTN/VF as this time, no suspicion for glaucoma   - Given IOP mid-low teens and tilted disc likely obscuring OCT findings, continue to monitor off drops.      4. Blepharitis and Keratoconjunctivitis sicca OU  - With complaint of burning most hours of the day  - Did not notice improvement with lotemax, restasis, xiidra, BCL, Avenova, punctal plugs  - Has punctal plug in place OS, absent OD  - Lab work: SSA, SSB, YAW, RF, ACE/lysozyme, scleroderma, Anti-smith, CRP/ESR all within normal limits  - Current regimen: PFATs q3 hours, WC & LS 1-2 times/day, genteal   -2/29/24 no dryness on exam today   - Continue to Monitor       5. Bilateral lower eyelid laxity, trace involutional ectropion  - May be contributing to eye surface symptoms  - Will treat surface as above & defer any surgery until after possible  cataract surgery and Dr. Woodard strabismus eval       6. Pseudophakia OS (10/8/20)  7. PCO OS   - BCVA 20/25 11/18/22  - 2/29/24: starting to see temporal crescent in OS, with new ealry pco temporally, dfe otherwise wnl. RD precautions discussed but would hold off on yag at this time       RTC 6 months DFE

## 2024-09-16 ENCOUNTER — CLINICAL SUPPORT (OUTPATIENT)
Dept: ENDOCRINOLOGY | Facility: CLINIC | Age: 79
End: 2024-09-16
Payer: MEDICARE

## 2024-09-16 DIAGNOSIS — M81.0 OSTEOPOROSIS, UNSPECIFIED OSTEOPOROSIS TYPE, UNSPECIFIED PATHOLOGICAL FRACTURE PRESENCE: Primary | ICD-10-CM

## 2024-09-16 PROCEDURE — 99211 OFF/OP EST MAY X REQ PHY/QHP: CPT | Mod: PBBFAC,25

## 2024-09-16 PROCEDURE — 96372 THER/PROPH/DIAG INJ SC/IM: CPT | Mod: PBBFAC

## 2024-09-16 RX ADMIN — DENOSUMAB 60 MG: 60 INJECTION SUBCUTANEOUS at 09:09

## 2024-09-19 ENCOUNTER — OFFICE VISIT (OUTPATIENT)
Dept: OTOLARYNGOLOGY | Facility: CLINIC | Age: 79
End: 2024-09-19
Payer: MEDICARE

## 2024-09-19 DIAGNOSIS — H90.3 SENSORINEURAL HEARING LOSS (SNHL) OF BOTH EARS: Primary | ICD-10-CM

## 2024-09-19 DIAGNOSIS — R42 DIZZINESS: ICD-10-CM

## 2024-09-19 DIAGNOSIS — J30.1 ALLERGIC RHINITIS DUE TO POLLEN, UNSPECIFIED SEASONALITY: ICD-10-CM

## 2024-09-19 PROCEDURE — 3288F FALL RISK ASSESSMENT DOCD: CPT | Mod: CPTII,95,, | Performed by: NURSE PRACTITIONER

## 2024-09-19 PROCEDURE — 99442 PR PHYSICIAN TELEPHONE EVALUATION 11-20 MIN: CPT | Mod: 95,,, | Performed by: NURSE PRACTITIONER

## 2024-09-19 PROCEDURE — 1101F PT FALLS ASSESS-DOCD LE1/YR: CPT | Mod: CPTII,95,, | Performed by: NURSE PRACTITIONER

## 2024-09-19 PROCEDURE — 1159F MED LIST DOCD IN RCRD: CPT | Mod: CPTII,95,, | Performed by: NURSE PRACTITIONER

## 2024-09-19 RX ORDER — CETIRIZINE HYDROCHLORIDE 10 MG/1
10 TABLET ORAL DAILY
Qty: 30 TABLET | Refills: 11 | Status: SHIPPED | OUTPATIENT
Start: 2024-09-19 | End: 2025-09-14

## 2024-09-19 RX ORDER — FLUTICASONE PROPIONATE 50 MCG
2 SPRAY, SUSPENSION (ML) NASAL 2 TIMES DAILY
Qty: 16 G | Refills: 11 | Status: SHIPPED | OUTPATIENT
Start: 2024-09-19 | End: 2024-10-19

## 2024-09-19 NOTE — PROGRESS NOTES
Audio Only Telehealth Visit     The patient location is: [facility]  The chief complaint leading to consultation is: f/u  Visit type: Virtual visit with audio only (telephone)  Total time spent on visit: 14 min     The reason for the audio only service rather than synchronous audio and video virtual visit was related to technical difficulties or patient preference/necessity.     Each patient to whom I provide medical services by telemedicine is:  (1) informed of the relationship between the physician and patient and the respective role of any other health care provider with respect to management of the patient; and (2) notified that they may decline to receive medical services by telemedicine and may withdraw from such care at any time. Patient verbally consented to receive this service via voice-only telephone call.       HPI: 4/12/21: 75yoF referred for dizziness. Is having dizzy episodes ~1-2x/day which began 1/2021. Describes as feeling is as if balance is off and lightheaded; is unsure what exacerbates this. Also has occasional brief room spinning when she turns over in the bed. Episodes last for a few seconds. Reports having normal orthostatic VS at last PCP visit. Denies appreciable HL or tinnitus. Denies medication change. Endorses falling on either side of her body in June of 2020 and January of 2021.    5/26/21: Reports dizziness has been greatly improved since Epley and with home maneuvers. Requesting to have her ears cleaned. She uses qtips to clean them at home.    6/17/21: Denies otorrhea or otalgia. Continues to do well from a dizziness standpoint.    10/11/2022: Reports she began having vertiginous dizziness again 4-6 weeks ago which is similar to her previous BPPV. She has not had any falls or medication changes. She is wondering if it is because she has ear wax buildup.     11/22/22: States that dizziness had improved following Epley, however, she had a bad episode again last week. She reports  resolution following home Epley demonstrated by audiology, but she is very concerned and anxious about it coming back.    5/23/23: Reports continued dizziness, occurring a few times weekly. Feels spinning to lightheaded, similar to before but not as severe. Episodes do not last long. She is unable to say if there are exacerbating factors. Right ear canal has been itchy & she feels that it needs to be cleaned.    11/28/23: States she had been doing very well since last visit, however, she has had a couple of transient episodes of lightheadedness which began 2 days ago. This is not exacerbated by movement. She is wondering if it could be r/t her vision. She has a left eye surgery scheduled in January & plans to get new glasses following surgery.     3/19/24: Lightheaded episodes have improved. States she is still nervous that dizziness will return.     09/19/2024: Doing well overall. Has infrequent lightheadedness but no vertiginous dizziness. States her allergies are flaring with the season change, and she has been having some nasal congestion and rhinitis. She has tried flonase & zyrtec prn.     Audio:        Assessment and plan:  78 y.o. female with hx of recurrent right BPPV, currently resolved. Audio with moderate SNHL of high frequencies b/l.   - Camara Coreyoff exercises prn  - Safety precautions  - Annual audio  - Flonase BID (reviewed technique)  - Zyrtec daily   - RTC 6mo       Annalisa Mack NP      This service was not originating from a related E/M service provided within the previous 7 days nor will  to an E/M service or procedure within the next 24 hours or my soonest available appointment.  Prevailing standard of care was able to be met in this audio-only visit.

## 2024-10-07 DIAGNOSIS — E06.3 HASHIMOTO'S THYROIDITIS: ICD-10-CM

## 2024-10-07 RX ORDER — LEVOTHYROXINE SODIUM 137 UG/1
68.5 TABLET ORAL DAILY
Qty: 45 TABLET | Refills: 1 | Status: SHIPPED | OUTPATIENT
Start: 2024-10-07

## 2024-10-07 NOTE — PROGRESS NOTES
[facility] Internal Medicine Clinic Visit    Chief Complaint:      Tinnitus.     Subjective:     HPI:  Mikala Thompson is a 79 y.o. female who has a past medical history of Amblyopia, Benign paroxysmal vertigo, bilateral, Cataract, Glaucoma, Hyperlipidemia, Hypertension, Hypothyroidism, Hypothyroidism, unspecified, Osteoarthritis, and Strabismus.  She presents to clinic today for follow-up of chronic medical conditions. No new complaints today. Continues to have her chronic knee pain, she follows PRN with sports medicine.  Sx worse in the cold weather. States she takes ibuprofen intermittently (3 times this past week) which does help some, voltaren gel, lidocaine patches with mixed amounts of relief. She follows with endocrine for hypothyroidism, osteoporosis, primary hyperparathyroidism. For hyperparathyroid she has declined surgical intervention, elected for medical management. Prolia q6months for osteoporosis. Denies any chest pain, abd pain, N/V/D, constipation, SOB, fevers, paresthesias.  Discussed sleep hygiene for insomnia concern.  Does get some relief with melatonin she takes it home.      Today she is c/o tinnitus. Sx constant. Associated complaints of sinus drainage, cough and congestion that has been worse over the past week.  She does note having seasonal allergies which has been worse than usual lately, she has had a video call with ENT last month was given Claritin and review of how to use Flonase.  Patient states she has been using Flonase and completed her course of Claritin yesterday.  Denies any fevers, rash, HA, N/V. States her dizziness is roughly unchanged.         Past Medical History:   Diagnosis Date    Amblyopia     Benign paroxysmal vertigo, bilateral     Cataract     Glaucoma     Hyperlipidemia     Hypertension     Hypothyroidism     Hypothyroidism, unspecified     Osteoarthritis     Strabismus        Past Surgical History:   Procedure Laterality Date    CATARACT EXTRACTION      EYE MUSCLE  SURGERY Left 01/26/2024    strabismus sx OS - Dr. Kike Woodard    EYE SURGERY  10/2020    Cataracts    FRACTURE SURGERY Left 06/2020    Wrist    PHACOEMULSIFICATION, CATARACT, WITH IOL INSERTION Right 07/02/2024    Procedure: PHACOEMULSIFICATION, CATARACT, WITH IOL INSERTION;  Surgeon: Uriah Mcarthur MD;  Location: [facility];  Service: Ophthalmology;  Laterality: Right;  MX60T 2.00 +12.5D    TUBAL LIGATION  02/1980       Social History     Socioeconomic History    Marital status: Single   Tobacco Use    Smoking status: Never     Passive exposure: Never    Smokeless tobacco: Never   Substance and Sexual Activity    Alcohol use: Not Currently    Drug use: Never    Sexual activity: Not Currently     Partners: Male     Birth control/protection: Abstinence, Post-menopausal     Social Drivers of Health     Financial Resource Strain: Low Risk  (6/3/2024)    Overall Financial Resource Strain (CARDIA)     Difficulty of Paying Living Expenses: Not very hard   Food Insecurity: No Food Insecurity (6/3/2024)    Hunger Vital Sign     Worried About Running Out of Food in the Last Year: Never true     Ran Out of Food in the Last Year: Never true   Transportation Needs: No Transportation Needs (6/3/2024)    TRANSPORTATION NEEDS     Transportation : No   Physical Activity: Inactive (6/3/2024)    Exercise Vital Sign     Days of Exercise per Week: 0 days     Minutes of Exercise per Session: 0 min   Stress: No Stress Concern Present (6/3/2024)    [employer] [place] [employer] - Occupational Stress Questionnaire     Feeling of Stress : Not at all   Housing Stability: Low Risk  (6/3/2024)    Housing Stability Vital Sign     Unable to Pay for Housing in the Last Year: No     Homeless in the Last Year: No         Current Outpatient Medications   Medication Instructions    amLODIPine (NORVASC) 5 MG tablet See Instructions, Take 1 tablet by mouth once daily, # 90 tab(s), 7 Refill(s), Pharmacy: [id number], 150, cm,  "Height/Length Dosing, 02/01/22 10:09:00 CST, 64.75, kg, Weight Dosing, 02/01/22 10:09:00 CST    cetirizine (ZYRTEC) 10 mg, Oral, Daily    flurbiprofen (OCUFEN) 0.03 % ophthalmic solution 1 drop, Once    fluticasone propionate (FLONASE) 100 mcg, Each Nostril, 2 times daily    ketorolac 0.5% (ACULAR) 0.5 % Drop 1 drop, Right Eye, 4 times daily    levothyroxine (SYNTHROID) 68.5 mcg, Oral, Daily    losartan (COZAAR) 50 mg, Oral, Daily    pravastatin (PRAVACHOL) 40 MG tablet See Instructions, Take 1 tablet by mouth once daily, # 90 tab(s), 5 Refill(s), Pharmacy: [id number], 150, cm, Height/Length Dosing, 02/01/22 10:09:00 CST, 64.75, kg, Weight Dosing, 02/01/22 10:09:00 CST       Review of Systems  Review of Systems   Constitutional:  Negative for chills and fever.   HENT:  Negative for hearing loss and sore throat.    Eyes:  Negative for photophobia, pain and discharge.   Respiratory:  Negative for cough, shortness of breath and wheezing.    Cardiovascular:  Negative for chest pain and palpitations.   Gastrointestinal:  Negative for abdominal pain, blood in stool, constipation, diarrhea, nausea and vomiting.   Genitourinary:  Negative for dysuria, flank pain and hematuria.   Musculoskeletal:  Positive for joint pain (chronic). Negative for falls, myalgias and neck pain.   Skin:  Negative for itching and rash.   Neurological:  Negative for dizziness, tingling, tremors, sensory change, speech change, focal weakness, seizures, loss of consciousness, weakness and headaches.   Endo/Heme/Allergies:  Negative for polydipsia. Does not bruise/bleed easily.       Objective:   Last 24 Hour Vital Signs:  Vitals  BP: 121/78  Temp: 97.8 °F (36.6 °C)  Temp Source: Oral  Pulse: 83  Resp: 18  SpO2: 99 %  Height: 4' 11" (149.9 cm)  Weight: 60.1 kg (132 lb 6.4 oz)       Physical Examination:  Vital signs and nursing notes reviewed.  Constitutional: Patient is in NAD. Awake and alert.    Head: Atraumatic. Normocephalic.  Eyes: " Conjunctivae nl. No scleral icterus. Strabismus to left eye.   ENT: Mucous membranes are moist. Oropharynx is clear.  No tonsillar exudates.  Right ear with hard crusted cerumen in external auditory canal, partially obscuring view of TM.  From what I can visualize there was no erythema although there was a slight effusion.  Nontender.  Left TM within normal limits.  Neck: Supple. Full ROM.    Cardiovascular: Regular rate and rhythm. No murmurs, rubs, or gallops. Distal pulses are 2+ and symmetric .  Pulmonary/Chest: No respiratory distress. Clear to auscultation bilaterally. No wheezing, rales, or rhonchi.  Abdominal: Soft. Non-distended. No TTP. No rebound, guarding, or rigidity.   Musculoskeletal: Moves all extremities. No edema.  Mild area of swelling to just below the medial knee.  Mild tenderness.  Full range of motion of bilateral knees.  No tenderness to the joint lines.  Skin: Warm and dry.  Neurological: Awake and alert. No acute focal neurological deficits are appreciated.        Assessment & Plan:     Hypertension  -well controlled.  -Continue amlodipine 5       Hyperlipidemia  -Cholesterol 210,  on 11/15/22.   -Advised patient to avoid fatty, greasy and fried foods and to incorporate more fruits and vegetables in her diet  -Continue pravastatin 40    Hypothyroidism  - TSH wnl on 7/27/2022 however TPO and thyroglobulin +ve - suggestive of Hashimotos  - On Synthroid 68.5mcg daily  - No new symptoms.      primary hyperparathyroidism  -Following up with endocrinology, she deferred surgical management at this time   -Continue monitoring Ca levels, prolia per endo.   Labs today     Bilateral wrist osteoarthritis/knee osteoarthritis  Suspect pes anserine bursitis.  - Advised against the chronic use of NSAIDs given her slightly depressed renal function  - Follows with sports medicine clinic for steroid injections which provides great relief for pt. pt to f/u PRN. She will let me know if she needs another  referral.   - Patient currently not on any NSAIDs   - improved.      Osteoporosis given fragility fracture  -DEXA scan on 12/22 showed osteopenia in the femoral necks  -T score of -2. Bilateral femoral neck avg density. L1-L4 T= 0.3.   -Alendronate DCed on 7/2021 given GI adverse effects an started on Prolia with q6mts  -  monitor Ca levels  - DEXA 12/12/22 The bilateral total mean femoral bone mineral density is equal to 0.752 g/cm squared with a T score of -2.0. This is improved from prior in 2020.      Left distal radius fracture s/p ORIF -recovered  -Had a left distal radius fx on 6/30/2020 and underwent ORIF on 7/21/2020  -Continue f/u with Ortho     Glaucoma  Blepharitis/keratoconjunctivitis  Strabismus.   Cataracts  -Continue f/u with Ophthalmology     Traumatic chronic rotatory cuff tear  -MRI upper extremity right side showed subacute appearing and mildly impacted fracture of the humeral neck with extension to the posterolateral humeral head, no gross fragment displacement identified.  Suspected chronic partial-thickness tear of the supraspinatus tendon  -continue following up with ortho clinic.     Stage 2 CKD  -eGFR on 7/2021 was 72, stable  - 3/22 was 70 - stable.  - eGFR > 60 today   Continue renal sparing activities:  -Follow low sodium diet (2 grams a day)  -Control high blood pressure ( goal BP < 130/80, please record BP at home every day and bring log to next office visit)  -Exercise at least 30 minutes a day, 5 days a week.  -Maintain healthy weight.  -Decrease or stop alcohol use  -Do not smoke  -Stay well hydrated  -Receive Pneumovax, Flu, and HBV vaccines as indicated.  -Do not take NSAIDs (Ibuprofen, Naproxen, Aleve, Advil, Toradol, Mobic), may take only Tylenol as needed for pain/headaches.  -Take cholesterol-lowering medications as prescribed (LDL goal <100)      BBPV  -SNHL  -Good resolution of symptoms after Epley maneuver  -Continue follow-up with ENT/audiology appt is later this month.  -  Ok to take dramamine or meclizine prn, however, this is not a definitive tx.  - new complaint of tinnitis today. DC losartan which may be an offending agent. Norvasc also potentially suspicious. VS recent URI vs worse allergies. Call ENT back regarding tinnitus sx if no improvement in the coming days.     Health Maintenance  Colon cancer screening: Normal in 2013. Cologuard ordered.     Lung Cancer screen: not indicated.  Mammogram: continue annual screening. Normal 10/22.  PapSmear: has discussed with GYN, no longer required.   Hep C: non-reactive.  Dexa: UTD. see above note of osteoporosis  Vaccines      Pneumonia: UTD      Annual Flu: UTD      Zoster: declines.      Tdap: 7/25/18    Immunization History   Administered Date(s) Administered    COVID-19 MRNA, LN-S PF (MODERNA HALF 0.25 ML DOSE) 10/25/2021    COVID-19 Vaccine 10/25/2021, 04/11/2022    COVID-19, MRNA, LN-S, PF (MODERNA FULL 0.5 ML DOSE) 02/19/2021, 03/19/2021    Influenza 11/13/2017    Influenza - Quadrivalent 12/14/2016    Influenza - Quadrivalent - High Dose - PF (65 years and older) 10/21/2020, 10/04/2021, 10/11/2022    Influenza - Trivalent - Fluzone High Dose - PF (65 years and older) 11/04/2015, 11/13/2017, 11/12/2018, 11/05/2019    Pneumococcal 11/04/2015    Pneumococcal Conjugate - 13 Valent 07/18/2019    Pneumococcal Polysaccharide - 23 Valent 11/04/2015    Tdap 07/25/2018, 07/25/2018           Follow-ups  -Follow-up medicine clinic in 4 months  - Optho   - ENT For BBPV, Annual audio  - endo   - GYN - pap deferred 2/2 age and adequate screenings.         DC losartan today, debrox, and lab work. Continue taking allergy meds.     Nahid Lazcano DO  [facility], [facility]

## 2024-10-14 ENCOUNTER — LAB VISIT (OUTPATIENT)
Dept: LAB | Facility: HOSPITAL | Age: 79
End: 2024-10-14
Payer: MEDICARE

## 2024-10-14 ENCOUNTER — OFFICE VISIT (OUTPATIENT)
Dept: INTERNAL MEDICINE | Facility: CLINIC | Age: 79
End: 2024-10-14
Payer: MEDICARE

## 2024-10-14 VITALS
BODY MASS INDEX: 26.69 KG/M2 | DIASTOLIC BLOOD PRESSURE: 78 MMHG | OXYGEN SATURATION: 99 % | HEART RATE: 83 BPM | TEMPERATURE: 98 F | WEIGHT: 132.38 LBS | HEIGHT: 59 IN | RESPIRATION RATE: 18 BRPM | SYSTOLIC BLOOD PRESSURE: 121 MMHG

## 2024-10-14 DIAGNOSIS — H93.19 TINNITUS, UNSPECIFIED LATERALITY: ICD-10-CM

## 2024-10-14 DIAGNOSIS — E03.9 HYPOTHYROID: ICD-10-CM

## 2024-10-14 DIAGNOSIS — Z01.10 OTHER EXAMINATION OF EARS AND HEARING: ICD-10-CM

## 2024-10-14 DIAGNOSIS — E21.3 HYPERPARATHYROIDISM: ICD-10-CM

## 2024-10-14 DIAGNOSIS — E06.3 HYPOTHYROIDISM DUE TO HASHIMOTO THYROIDITIS: ICD-10-CM

## 2024-10-14 DIAGNOSIS — R94.6 ABNORMAL RESULTS OF THYROID FUNCTION STUDIES: ICD-10-CM

## 2024-10-14 DIAGNOSIS — I10 HYPERTENSION, UNSPECIFIED TYPE: Primary | ICD-10-CM

## 2024-10-14 DIAGNOSIS — E21.0 PRIMARY HYPERPARATHYROIDISM: ICD-10-CM

## 2024-10-14 DIAGNOSIS — M81.0 OSTEOPOROSIS, UNSPECIFIED OSTEOPOROSIS TYPE, UNSPECIFIED PATHOLOGICAL FRACTURE PRESENCE: ICD-10-CM

## 2024-10-14 LAB
ALBUMIN SERPL-MCNC: 4 G/DL (ref 3.4–4.8)
ALBUMIN/GLOB SERPL: 1.1 RATIO (ref 1.1–2)
ALP SERPL-CCNC: 71 UNIT/L (ref 40–150)
ALT SERPL-CCNC: 11 UNIT/L (ref 0–55)
ANION GAP SERPL CALC-SCNC: 8 MEQ/L
AST SERPL-CCNC: 16 UNIT/L (ref 5–34)
BASOPHILS # BLD AUTO: 0.09 X10(3)/MCL
BASOPHILS NFR BLD AUTO: 1.1 %
BILIRUB SERPL-MCNC: 0.4 MG/DL
BUN SERPL-MCNC: 18.8 MG/DL (ref 9.8–20.1)
CALCIUM SERPL-MCNC: 11.1 MG/DL (ref 8.4–10.2)
CHLORIDE SERPL-SCNC: 110 MMOL/L (ref 98–107)
CO2 SERPL-SCNC: 23 MMOL/L (ref 23–31)
CREAT SERPL-MCNC: 0.88 MG/DL (ref 0.55–1.02)
CREAT/UREA NIT SERPL: 21
EOSINOPHIL # BLD AUTO: 1.23 X10(3)/MCL (ref 0–0.9)
EOSINOPHIL NFR BLD AUTO: 14.8 %
ERYTHROCYTE [DISTWIDTH] IN BLOOD BY AUTOMATED COUNT: 13 % (ref 11.5–17)
GFR SERPLBLD CREATININE-BSD FMLA CKD-EPI: >60 ML/MIN/1.73/M2
GLOBULIN SER-MCNC: 3.7 GM/DL (ref 2.4–3.5)
GLUCOSE SERPL-MCNC: 81 MG/DL (ref 82–115)
HCT VFR BLD AUTO: 40.7 % (ref 37–47)
HGB BLD-MCNC: 13.4 G/DL (ref 12–16)
IMM GRANULOCYTES # BLD AUTO: 0.02 X10(3)/MCL (ref 0–0.04)
IMM GRANULOCYTES NFR BLD AUTO: 0.2 %
LYMPHOCYTES # BLD AUTO: 2.3 X10(3)/MCL (ref 0.6–4.6)
LYMPHOCYTES NFR BLD AUTO: 27.6 %
MCH RBC QN AUTO: 31.8 PG (ref 27–31)
MCHC RBC AUTO-ENTMCNC: 32.9 G/DL (ref 33–36)
MCV RBC AUTO: 96.4 FL (ref 80–94)
MONOCYTES # BLD AUTO: 0.68 X10(3)/MCL (ref 0.1–1.3)
MONOCYTES NFR BLD AUTO: 8.2 %
NEUTROPHILS # BLD AUTO: 4 X10(3)/MCL (ref 2.1–9.2)
NEUTROPHILS NFR BLD AUTO: 48.1 %
NRBC BLD AUTO-RTO: 0 %
PLATELET # BLD AUTO: 278 X10(3)/MCL (ref 130–400)
PMV BLD AUTO: 11.6 FL (ref 7.4–10.4)
POTASSIUM SERPL-SCNC: 5.2 MMOL/L (ref 3.5–5.1)
PROT SERPL-MCNC: 7.7 GM/DL (ref 5.8–7.6)
RBC # BLD AUTO: 4.22 X10(6)/MCL (ref 4.2–5.4)
SODIUM SERPL-SCNC: 141 MMOL/L (ref 136–145)
T4 FREE SERPL-MCNC: 1.35 NG/DL (ref 0.7–1.48)
TSH SERPL-ACNC: 0.47 UIU/ML (ref 0.35–4.94)
WBC # BLD AUTO: 8.32 X10(3)/MCL (ref 4.5–11.5)

## 2024-10-14 PROCEDURE — 80053 COMPREHEN METABOLIC PANEL: CPT

## 2024-10-14 PROCEDURE — 84443 ASSAY THYROID STIM HORMONE: CPT

## 2024-10-14 PROCEDURE — 84439 ASSAY OF FREE THYROXINE: CPT

## 2024-10-14 PROCEDURE — 36415 COLL VENOUS BLD VENIPUNCTURE: CPT

## 2024-10-14 PROCEDURE — 99214 OFFICE O/P EST MOD 30 MIN: CPT | Mod: PBBFAC

## 2024-10-14 PROCEDURE — 85025 COMPLETE CBC W/AUTO DIFF WBC: CPT

## 2024-10-14 NOTE — PROGRESS NOTES
I saw and evaluated the patient and was present for the key portions of the exam and separately billed procedures, if any.  I have reviewed and agree with the resident's findings, including all diagnostic interpretations and plans as written.    Pt with hx of Hashimoto thyroiditis, primary hyperparathyroidism, Vit D deficiency, hx of BPPV with complaints of tinnitus today that has been present for the last month. Pt without vertigo but does have a history of SNHL bilaterally. Pt with cerumen noted on exam. Last seen 1 month ago by ENT with sxs of runny nose congestion. Pt was tx for allergies with flonase and zyrtec however the tinnitus did not start till after pt had seen ENT. Agree with stopping 1 antihypertensive and debrox to see if improvement in sxs. Would also have patient see her ENT again to further evaluate. Considering secondary causes: medication induced, cerumen obstruction, middle ear disease, infection, ET dysfunction. Pt sees audiology next month. Labs today. Rest per resident note.    Neida Wang MD

## 2024-10-15 ENCOUNTER — HOSPITAL ENCOUNTER (EMERGENCY)
Facility: HOSPITAL | Age: 79
Discharge: HOME | End: 2024-10-15
Attending: EMERGENCY MEDICINE
Payer: MEDICARE

## 2024-10-15 VITALS
BODY MASS INDEX: 26.86 KG/M2 | DIASTOLIC BLOOD PRESSURE: 95 MMHG | TEMPERATURE: 97 F | HEART RATE: 82 BPM | OXYGEN SATURATION: 99 % | RESPIRATION RATE: 18 BRPM | HEIGHT: 59 IN | SYSTOLIC BLOOD PRESSURE: 129 MMHG | WEIGHT: 133.25 LBS

## 2024-10-15 DIAGNOSIS — H93.19 TINNITUS, UNSPECIFIED LATERALITY: ICD-10-CM

## 2024-10-15 DIAGNOSIS — R42 DIZZINESS: ICD-10-CM

## 2024-10-15 DIAGNOSIS — G44.89 OTHER HEADACHE SYNDROME: Primary | ICD-10-CM

## 2024-10-15 DIAGNOSIS — H69.90 DYSFUNCTION OF EUSTACHIAN TUBE, UNSPECIFIED LATERALITY: ICD-10-CM

## 2024-10-15 PROCEDURE — 99284 EMERGENCY DEPT VISIT MOD MDM: CPT | Mod: 25

## 2024-10-15 PROCEDURE — 96372 THER/PROPH/DIAG INJ SC/IM: CPT | Performed by: EMERGENCY MEDICINE

## 2024-10-15 PROCEDURE — 63600175 PHARM REV CODE 636 W HCPCS: Performed by: EMERGENCY MEDICINE

## 2024-10-15 PROCEDURE — 25000003 PHARM REV CODE 250: Performed by: EMERGENCY MEDICINE

## 2024-10-15 RX ORDER — ONDANSETRON 4 MG/1
4 TABLET, ORALLY DISINTEGRATING ORAL
Status: COMPLETED | OUTPATIENT
Start: 2024-10-15 | End: 2024-10-15

## 2024-10-15 RX ORDER — MECLIZINE HYDROCHLORIDE 25 MG/1
25 TABLET ORAL
Status: COMPLETED | OUTPATIENT
Start: 2024-10-15 | End: 2024-10-15

## 2024-10-15 RX ORDER — ONDANSETRON 4 MG/1
4 TABLET, ORALLY DISINTEGRATING ORAL EVERY 8 HOURS PRN
Qty: 14 TABLET | Refills: 0 | Status: SHIPPED | OUTPATIENT
Start: 2024-10-15

## 2024-10-15 RX ORDER — MECLIZINE HYDROCHLORIDE 25 MG/1
25 TABLET ORAL 3 TIMES DAILY PRN
Qty: 20 TABLET | Refills: 0 | Status: SHIPPED | OUTPATIENT
Start: 2024-10-15

## 2024-10-15 RX ORDER — KETOROLAC TROMETHAMINE 30 MG/ML
15 INJECTION, SOLUTION INTRAMUSCULAR; INTRAVENOUS
Status: COMPLETED | OUTPATIENT
Start: 2024-10-15 | End: 2024-10-15

## 2024-10-15 RX ADMIN — DEXAMETHASONE 6 MG: 0.5 TABLET ORAL at 12:10

## 2024-10-15 RX ADMIN — KETOROLAC TROMETHAMINE 15 MG: 30 INJECTION, SOLUTION INTRAMUSCULAR; INTRAVENOUS at 01:10

## 2024-10-15 RX ADMIN — MECLIZINE HYDROCHLORIDE 25 MG: 25 TABLET ORAL at 12:10

## 2024-10-15 RX ADMIN — ONDANSETRON 4 MG: 4 TABLET, ORALLY DISINTEGRATING ORAL at 01:10

## 2024-10-15 NOTE — ED PROVIDER NOTES
ED PROVIDER NOTE  10/15/2024    CHIEF COMPLAINT:   Chief Complaint   Patient presents with    Headache     Pt reports headache x 1 wk, started with throbbing progressing to ear pain, nausea, dizzy. Hx: HTN.       HISTORY OF PRESENT ILLNESS:   Mikala Thompson is a 79 y.o. female who presents with chief complaint Headache.  Onset was a week ago whenever she began having sensation in the back of her head going towards her ears that she states feels like a humming that has been constant.  Associated symptoms of headache that she states just began today along with pressure in both of her ears, nausea come and dizziness that she describes as spinning sensation.  States she has a history of vertigo.  Denies trauma or injury, neck pain or stiffness, trouble with speech or swallowing, numbness or weakness in extremities, double vision.    The history is provided by the patient.         REVIEW OF SYSTEMS: as noted in the HPI.  NURSING NOTES REVIEWED      PAST MEDICAL/SURGICAL HISTORY:   Past Medical History:   Diagnosis Date    Amblyopia     Benign paroxysmal vertigo, bilateral     Cataract     Glaucoma     Hyperlipidemia     Hypertension     Hypothyroidism     Hypothyroidism, unspecified     Osteoarthritis     Strabismus       Past Surgical History:   Procedure Laterality Date    CATARACT EXTRACTION      EYE MUSCLE SURGERY Left 01/26/2024    strabismus sx OS - Dr. Kike Woodard    EYE SURGERY  10/2020    Cataracts    FRACTURE SURGERY Left 06/2020    Wrist    PHACOEMULSIFICATION, CATARACT, WITH IOL INSERTION Right 07/02/2024    Procedure: PHACOEMULSIFICATION, CATARACT, WITH IOL INSERTION;  Surgeon: Uriah Mcarthur MD;  Location: [facility];  Service: Ophthalmology;  Laterality: Right;  MX60T 2.00 +12.5D    TUBAL LIGATION  02/1980       FAMILY HISTORY:   Family History   Problem Relation Name Age of Onset    Cancer Maternal Uncle Uncle Luis Eduardo     Diabetes Maternal Grandmother Annie Pimentel        SOCIAL HISTORY:   Social History      Tobacco Use    Smoking status: Never     Passive exposure: Never    Smokeless tobacco: Never   Substance Use Topics    Alcohol use: Not Currently    Drug use: Never       ALLERGIES: Review of patient's allergies indicates:  No Known Allergies    PHYSICAL EXAM:  Initial Vitals [10/15/24 1150]   BP Pulse Resp Temp SpO2   (!) 164/73 85 18 97.3 °F (36.3 °C) 99 %      MAP       --         Physical Exam    Nursing note and vitals reviewed.  Constitutional: She appears well-developed and well-nourished.   HENT:   Head: Normocephalic and atraumatic.   Right Ear: Ear canal normal. No drainage, swelling or tenderness. Tympanic membrane is not erythematous. A middle ear effusion (clear) is present. No hemotympanum.   Left Ear: Tympanic membrane and ear canal normal. No drainage, swelling or tenderness. Tympanic membrane is not erythematous. No hemotympanum. Mouth/Throat: Uvula is midline and mucous membranes are normal.   Eyes: EOM are normal. Pupils are equal, round, and reactive to light.   Neck: Trachea normal. Neck supple.   Cardiovascular:  Normal rate, regular rhythm and normal pulses.           Pulmonary/Chest: Effort normal and breath sounds normal.   Abdominal: Abdomen is soft. Bowel sounds are normal. There is no rebound and no guarding.   Musculoskeletal:         General: Normal range of motion.      Cervical back: Neck supple.     Neurological: She is alert and oriented to person, place, and time. She has normal strength. No cranial nerve deficit or sensory deficit. GCS eye subscore is 4. GCS verbal subscore is 5. GCS motor subscore is 6.   Normal finger-nose-finger test.   Skin: Skin is warm and dry.   Psychiatric: She has a normal mood and affect. Her speech is normal. Thought content normal.         RESULTS:  Labs Reviewed - No data to display  Imaging Results              CT Head Without Contrast (Final result)  Result time 10/15/24 13:08:03      Final result by Shabbir Pacheco MD (10/15/24 13:08:03)                    Impression:      No acute intracranial findings.      Electronically signed by: Shabbir Pacheco  Date:    10/15/2024  Time:    13:08               Narrative:    EXAMINATION:  CT HEAD WITHOUT CONTRAST    CLINICAL HISTORY:  Dizziness, persistent/recurrent, cardiac or vascular cause suspected;    TECHNIQUE:  CT imaging of the head performed from the skull base to the vertex without intravenous contrast.   mGycm. Automatic exposure control, adjustment of mA/kV or iterative reconstruction technique was used to reduce radiation.    COMPARISON:  MRI 2 March 2022    FINDINGS:  Motion degraded scan.    There is no acute cortical infarct, hemorrhage or mass lesion.  There is moderate patchy hypoattenuation in the cerebral white matter which is nonspecific but most commonly associated with chronic small vessel ischemic changes.  The ventricles are not significantly enlarged.  There are vascular calcifications.    Visualized paranasal sinuses and mastoid air cells are clear.                                      PROCEDURES:  Procedures    ECG:       ED COURSE AND MEDICAL DECISION MAKING:  Medications   meclizine tablet 25 mg (25 mg Oral Given 10/15/24 1258)   dexAMETHasone tablet 6 mg (6 mg Oral Given 10/15/24 1258)   ketorolac injection 15 mg (15 mg Intramuscular Given 10/15/24 1335)   ondansetron disintegrating tablet 4 mg (4 mg Oral Given 10/15/24 1335)     ED Course as of 10/16/24 0736   e Oct 15, 2024   1327 Reports improvement after meclizine, still having some nausea. [IB]      ED Course User Index  [IB] Jose Elias George, DO        Medical Decision Making  79-year-old female who presents with occipital headache that she describes as humming and throbbing also having some ringing in her ears and intermittent vertigo, which she states she was had vertigo in the past.  She has no focal neurologic deficit on examination.  CT head shows no acute intracranial process.  Reports feeling better after meclizine.   We will discharge with meclizine and Zofran and given ENT referral.  Given strict ED return precautions. I have spoken with the patient and/or caregivers. I have explained the patient's condition, diagnoses and treatment plan based on the information available to me at this time. I have answered the patient's and/or caregiver's questions and addressed any concerns. The patient and/or caregivers have as good an understanding of the patient's diagnosis, condition and treatment plan as can be expected at this point. The vital signs have been stable. The patient's condition is stable and appropriate for discharge from the emergency department.     The patient will pursue further outpatient evaluation with the primary care physician or other designated or consulting physician as outlined in the discharge instructions. The patient and/or caregivers are agreeable to this plan of care and follow-up instructions have been explained in detail. The patient and/or caregivers have received these instructions in written format and have expressed an understanding of the discharge instructions. The patient and/or caregivers are aware that any significant change in condition or worsening of symptoms should prompt an immediate return to this or the closest emergency department or a call to 911.    Amount and/or Complexity of Data Reviewed  Radiology: ordered.    Risk  OTC drugs.  Prescription drug management.  Decision regarding hospitalization.        CLINICAL IMPRESSION:  1. Other headache syndrome    2. Dizziness    3. Dysfunction of Eustachian tube, unspecified laterality    4. Tinnitus, unspecified laterality        DISPOSITION:   ED Disposition Condition    Discharge Stable            ED Prescriptions       Medication Sig Dispense Start Date End Date Auth. Provider    meclizine (ANTIVERT) 25 mg tablet Take 1 tablet (25 mg total) by mouth 3 (three) times daily as needed. 20 tablet 10/15/2024 -- Jose Elias George DO    ondansetron  (ZOFRAN-ODT) 4 MG TbDL Take 1 tablet (4 mg total) by mouth every 8 (eight) hours as needed (nausea and vomiting). 14 tablet 10/15/2024 -- Jose Elias George DO          Follow-up Information       Follow up With Specialties Details Why Contact Info    Nahid Lazcano DO Internal Medicine Schedule an appointment as soon as possible for a visit   [address] [facility] [address]  [phone]      [facility] Emergency Dept Emergency Medicine  If symptoms worsen [address] [facility] [id number] [phone]               Jose Elias George DO  10/16/24 0736

## 2024-11-05 ENCOUNTER — CLINICAL SUPPORT (OUTPATIENT)
Dept: AUDIOLOGY | Facility: HOSPITAL | Age: 79
End: 2024-11-05
Payer: MEDICARE

## 2024-11-05 DIAGNOSIS — H90.3 SENSORINEURAL HEARING LOSS, BILATERAL: Primary | ICD-10-CM

## 2024-11-05 DIAGNOSIS — H90.3 SENSORINEURAL HEARING LOSS (SNHL) OF BOTH EARS: ICD-10-CM

## 2024-11-05 DIAGNOSIS — R42 DIZZINESS: ICD-10-CM

## 2024-11-05 PROCEDURE — 92567 TYMPANOMETRY: CPT | Performed by: AUDIOLOGIST

## 2024-11-05 PROCEDURE — 92557 COMPREHENSIVE HEARING TEST: CPT | Performed by: AUDIOLOGIST

## 2024-11-05 NOTE — PROGRESS NOTES
Hearing Evaluation        Patient History: Ms. Thompson is in for a hearing evaluation due to sudden onset of bilateral tinnitus described as intermittently changing from ringing to humming. The symptoms started about 6 weeks ago and is constant.  She denies changes in hearing but endorses slight vertiginous symptoms provoked by either right or left head turns.  She also states that her vertiginous symptoms last only a few minutes.  Ms. Thompson also reports associated nausea due to intensity of the tinnitus. Middle ear issues are denied at this time. All additional history is unremarkable.        Test Results:                    Pure Tone Testing:      Right ear:       Mild to moderate sensorineural hearing loss from 250-8kHz. Speech reception threshold is in agreement with puretone findings.  Discrimination score of 100% is considered excellent.        Left ear:          Mild to moderate sensorineural hearing loss from 250-8kHz. Speech reception threshold is in agreement with puretone findings.  Discrimination score of 100% is considered excellent.                                                                            Tympanometry:                                           Right ear:       Type 'A' tymp, normal middle ear pressure/function     Left ear:          Type 'A' tymp, normal middle ear pressure/function                      Interpretations:      Behavioral test findings indicate a slight decrease in hearing (especially in low frequency range) since previous hearing evaluation. Speech reception thresholds obtained at 25dB, AD and 20dB, AS, and are in agreement with puretone findings. Speech discrimination scores of 100%, AU, are considered excellent.  Immittance measures indicate normal middle ear pressure/function, bilaterally. Note, I offered hallpike screening to r/o BPPV as patient has had it in the past. She denied maneuver stating her vertigo is not like it was before.  May try again at her ENT  follow up.           Recommendations:   Continue ENT follow up  RTC per ENT

## 2024-12-05 NOTE — PROGRESS NOTES
[facility]  Otolaryngology Clinic Note    Mikala Thompson  YOB: 1945    Chief Complaint: tinnitus    HPI: 4/12/21: 75yoF referred for dizziness. Is having dizzy episodes ~1-2x/day which began 1/2021. Describes as feeling is as if balance is off and lightheaded; is unsure what exacerbates this. Also has occasional brief room spinning when she turns over in the bed. Episodes last for a few seconds. Reports having normal orthostatic VS at last PCP visit. Denies appreciable HL or tinnitus. Denies medication change. Endorses falling on either side of her body in June of 2020 and January of 2021.    5/26/21: Reports dizziness has been greatly improved since Epley and with home maneuvers. Requesting to have her ears cleaned. She uses qtips to clean them at home.    6/17/21: Denies otorrhea or otalgia. Continues to do well from a dizziness standpoint.    10/11/2022: Reports she began having vertiginous dizziness again 4-6 weeks ago which is similar to her previous BPPV. She has not had any falls or medication changes. She is wondering if it is because she has ear wax buildup.     11/22/22: States that dizziness had improved following Epley, however, she had a bad episode again last week. She reports resolution following home Epley demonstrated by audiology, but she is very concerned and anxious about it coming back.    5/23/23: Reports continued dizziness, occurring a few times weekly. Feels spinning to lightheaded, similar to before but not as severe. Episodes do not last long. She is unable to say if there are exacerbating factors. Right ear canal has been itchy & she feels that it needs to be cleaned.    11/28/23: States she had been doing very well since last visit, however, she has had a couple of transient episodes of lightheadedness which began 2 days ago. This is not exacerbated by movement. She is wondering if it could be r/t her vision. She has a left eye surgery scheduled in  January & plans to get new glasses following surgery.     3/19/24: Lightheaded episodes have improved. States she is still nervous that dizziness will return.     09/19/2024: Doing well overall. Has infrequent lightheadedness but no vertiginous dizziness. States her allergies are flaring with the season change, and she has been having some nasal congestion and rhinitis. She has tried flonase & zyrtec prn.     12/06/24: Presents today with c/o tinnitus. States this began acutely about 6 weeks ago, and she went to the ED. States the humming and drumming noises were so loud that they were affecting her balance and making her nauseated. Symptoms have improved some, now being intermittent. States she notice tinnitus is much louder when she is in crowded areas, such as the grocery store, as she does not like crowds. It is better when she is home and things are quiet. Reports she is uninterested in repeating Hallpike, that it helped but she did not like it at all.      ROS:   10-point review of systems negative except per HPI      Review of patient's allergies indicates:  No Known Allergies    Past Medical History:   Diagnosis Date    Amblyopia     Cataract     Glaucoma     Hyperlipidemia     Hypertension     Osteoarthritis     Strabismus        Past Surgical History:   Procedure Laterality Date    CATARACT EXTRACTION         Social History     Socioeconomic History    Marital status: Single   Tobacco Use    Smoking status: Never    Smokeless tobacco: Never   Substance and Sexual Activity    Alcohol use: Never    Drug use: Never    Sexual activity: Not Currently     Partners: Male     Birth control/protection: Post-menopausal       Family History   Problem Relation Age of Onset    Cancer Maternal Uncle     Diabetes Maternal Grandmother        Outpatient Encounter Medications as of 10/11/2022   Medication Sig Dispense Refill    amLODIPine (NORVASC) 5 MG tablet Take 5 mg by mouth once daily.      amLODIPine (NORVASC) 5 MG  "tablet   See Instructions, Take 1 tablet by mouth once daily, # 90 tab(s), 7 Refill(s), Pharmacy: [id number], 150, cm, Height/Length Dosing, 02/01/22 10:09:00 CST, 64.75, kg, Weight Dosing, 02/01/22 10:09:00 CST      cycloSPORINE (RESTASIS) 0.05 % ophthalmic emulsion Place 1 drop into both eyes 2 (two) times daily. 30 each 2    diclofenac (VOLTAREN) 75 MG EC tablet       levothyroxine (SYNTHROID) 137 MCG Tab tablet Take 1/2 (one-half) tablet by mouth once daily 45 tablet 2    losartan (COZAAR) 50 MG tablet       pravastatin (PRAVACHOL) 40 MG tablet   See Instructions, Take 1 tablet by mouth once daily, # 90 tab(s), 5 Refill(s), Pharmacy: [id number], 150, cm, Height/Length Dosing, 02/01/22 10:09:00 CST, 64.75, kg, Weight Dosing, 02/01/22 10:09:00 CST       No facility-administered encounter medications on file as of 10/11/2022.       Physical Exam:  Vitals:    10/11/22 0858   BP: 113/77   BP Location: Right arm   Patient Position: Sitting   BP Method: Medium (Automatic)   Pulse: 80   Temp: 97.8 °F (36.6 °C)   TempSrc: Oral   Weight: 64.1 kg (141 lb 6.4 oz)   Height: 4' 11" (1.499 m)     General: NAD, voice normal  Neuro: AAO, CN II - XII grossly intact  Head/ Face: NCAT, symmetric, sensations intact bilaterally. + TMJ TTP b/l  Eyes: EOMI, PERRL  Ears: externally normal with grossly normal hearing  AD: EAC with some nonobstructive cerumen- TM intact w/o effusion or retraction  AS: EAC patent. TM intact w/o effusion or retraction  Nose: bilateral nares patent, midline septum, no rhinorrhea, no external deformity, no turbinate hypertrophy  OC/OP: MMM, no intraoral lesions, no trismus, dentition is poor, no uvular deviation, bilaterally symmetric soft palate elevation, palatoglossus and palatopharyngeal fold wnl; tonsils are symmetric and 1+  Indirect laryngoscopy: deferred due to patient intolerance  Neck: soft, supple, no LAD, normal ROM, no thyromegaly  Respiratory: nonlabored, no wheezing, " bilateral chest rise  Cardiovascular: RRR  Gastrointestinal: S NT ND  Skin: warm, no lesions  Musculoskeletal: 5/5 strength  Psych: Appropriate affect/mood     Audio:           Assessment/Plan:  78 y.o. female with hx of recurrent right BPPV. Audio with b/l SNHL, some decrease from previous- reviewed. Given exacerbation in stressful environments, suspect myofascial component.   - Camara Daroff exercises prn  - Safety precautions  - Annual audio  - TMJ & neck exercises  - Encouraged stress reduction techniques  - Ok to try OTC lipoflavonoids if exercises are not helpful  - Zyrtec daily   - RTC 1yr       Annalisa Mack NP

## 2024-12-06 ENCOUNTER — OFFICE VISIT (OUTPATIENT)
Dept: OTOLARYNGOLOGY | Facility: CLINIC | Age: 79
End: 2024-12-06
Payer: MEDICARE

## 2024-12-06 VITALS — HEART RATE: 72 BPM | DIASTOLIC BLOOD PRESSURE: 72 MMHG | TEMPERATURE: 98 F | SYSTOLIC BLOOD PRESSURE: 111 MMHG

## 2024-12-06 DIAGNOSIS — H93.19 TINNITUS, UNSPECIFIED LATERALITY: Primary | ICD-10-CM

## 2024-12-06 DIAGNOSIS — M26.623 BILATERAL TEMPOROMANDIBULAR JOINT PAIN: ICD-10-CM

## 2024-12-06 DIAGNOSIS — R42 DIZZINESS: ICD-10-CM

## 2024-12-06 DIAGNOSIS — H90.3 SENSORINEURAL HEARING LOSS (SNHL) OF BOTH EARS: ICD-10-CM

## 2024-12-06 PROCEDURE — 99214 OFFICE O/P EST MOD 30 MIN: CPT | Mod: PBBFAC | Performed by: NURSE PRACTITIONER

## 2025-01-06 DIAGNOSIS — Z00.00 HEALTHCARE MAINTENANCE: ICD-10-CM

## 2025-01-06 RX ORDER — AMLODIPINE BESYLATE 5 MG/1
TABLET ORAL
Qty: 90 TABLET | Refills: 1 | Status: SHIPPED | OUTPATIENT
Start: 2025-01-06

## 2025-01-06 RX ORDER — PRAVASTATIN SODIUM 40 MG/1
TABLET ORAL
Qty: 90 TABLET | Refills: 1 | Status: SHIPPED | OUTPATIENT
Start: 2025-01-06

## 2025-01-15 ENCOUNTER — OFFICE VISIT (OUTPATIENT)
Dept: ENDOCRINOLOGY | Facility: CLINIC | Age: 80
End: 2025-01-15
Payer: MEDICARE

## 2025-01-15 VITALS
DIASTOLIC BLOOD PRESSURE: 64 MMHG | RESPIRATION RATE: 12 BRPM | WEIGHT: 127.88 LBS | SYSTOLIC BLOOD PRESSURE: 98 MMHG | HEIGHT: 59 IN | TEMPERATURE: 98 F | HEART RATE: 73 BPM | BODY MASS INDEX: 25.78 KG/M2

## 2025-01-15 DIAGNOSIS — E21.0 PRIMARY HYPERPARATHYROIDISM: Primary | ICD-10-CM

## 2025-01-15 DIAGNOSIS — E06.3 CHRONIC LYMPHOCYTIC THYROIDITIS: ICD-10-CM

## 2025-01-15 DIAGNOSIS — M81.0 OSTEOPOROSIS, UNSPECIFIED OSTEOPOROSIS TYPE, UNSPECIFIED PATHOLOGICAL FRACTURE PRESENCE: ICD-10-CM

## 2025-01-15 DIAGNOSIS — E55.9 VITAMIN D DEFICIENCY: ICD-10-CM

## 2025-01-15 PROCEDURE — 99214 OFFICE O/P EST MOD 30 MIN: CPT | Mod: PBBFAC

## 2025-01-15 RX ORDER — LOSARTAN POTASSIUM 25 MG/1
25 TABLET ORAL DAILY
COMMUNITY

## 2025-01-15 NOTE — PROGRESS NOTES
Endocrinology Clinic Note    Patient Name: Mikala Thompson  LLUVIAB: 1945   MRN: [id number]  Date: 01/15/2025  Home Address: [address] [place] [id number]      Subjective:     Chief Complaint:   Chief Complaint   Patient presents with    Follow-up     Osteoporosis        HPI:  The patient is a 79 y.o. year old female with hx of Primary hyperparathyroidism, Hypercalcemia, Osteoporosis, and Hashimoto's Thyroiditis who presents to endocrinology clinic for scheduled follow-up.      Patient is doing well today and has no complaints at this time.  Patient is currently on Prolia and takes this every 6 months.  Last DEXA scan in 2022 consistent with osteopenia.  Patient also followed for primary hyperparathyroidism.  Last CMP on 10/14/2024 showed calcium elevated at 11.1.  Surgical intervention was previously discussed with patient although she deferred any intervention like to continue management for now, states she does not have any symptoms.  She is compliant with all of her medications.      Past Medical History:   Diagnosis Date    Amblyopia     Benign paroxysmal vertigo, bilateral     Cataract     Glaucoma     Hyperlipidemia     Hypertension     Hypothyroidism     Hypothyroidism, unspecified     Osteoarthritis     Strabismus       Past Surgical History:   Procedure Laterality Date    CATARACT EXTRACTION      EYE MUSCLE SURGERY Left 01/26/2024    strabismus sx OS - Dr. Kike Woodard    EYE SURGERY  10/2020    Cataracts    FRACTURE SURGERY Left 06/2020    Wrist    PHACOEMULSIFICATION, CATARACT, WITH IOL INSERTION Right 07/02/2024    Procedure: PHACOEMULSIFICATION, CATARACT, WITH IOL INSERTION;  Surgeon: Uriah Mcarthur MD;  Location: [facility];  Service: Ophthalmology;  Laterality: Right;  MX60T 2.00 +12.5D    TUBAL LIGATION  02/1980   Allergy:  Review of patient's allergies indicates:  No Known Allergies     Current Medications:    Current Outpatient Medications:     amLODIPine (NORVASC) 5 MG  tablet, See Instructions, Take 1 tablet by mouth once daily, # 90 tab(s), 7 Refill(s), Pharmacy: [id number], 150, cm, Height/Length Dosing, 02/01/22 10:09:00 CST, 64.75, kg, Weight Dosing, 02/01/22 10:09:00 CST, Disp: 90 tablet, Rfl: 1    levothyroxine (SYNTHROID) 137 MCG Tab tablet, Take 0.5 tablets (68.5 mcg total) by mouth once daily., Disp: 45 tablet, Rfl: 1    losartan (COZAAR) 25 MG tablet, Take 25 mg by mouth once daily., Disp: , Rfl:     pravastatin (PRAVACHOL) 40 MG tablet, See Instructions, Take 1 tablet by mouth once daily, # 90 tab(s), 5 Refill(s), Pharmacy: [id number], 150, cm, Height/Length Dosing, 02/01/22 10:09:00 CST, 64.75, kg, Weight Dosing, 02/01/22 10:09:00 CST, Disp: 90 tablet, Rfl: 1    carbamide peroxide (DEBROX) 6.5 % otic solution, Place 5 drops into both ears as needed (ear wax build up)., Disp: 15 mL, Rfl: 1    cetirizine (ZYRTEC) 10 MG tablet, Take 1 tablet (10 mg total) by mouth once daily. (Patient not taking: Reported on 10/14/2024), Disp: 30 tablet, Rfl: 11    flurbiprofen (OCUFEN) 0.03 % ophthalmic solution, Place 1 drop into the right eye once. Do not use while wearing contact lenses (Patient not taking: Reported on 10/14/2024), Disp: , Rfl:     ketorolac 0.5% (ACULAR) 0.5 % Drop, Place 1 drop into the right eye 4 (four) times daily. (Patient not taking: Reported on 10/14/2024), Disp: 5 mL, Rfl: 1    meclizine (ANTIVERT) 25 mg tablet, Take 1 tablet (25 mg total) by mouth 3 (three) times daily as needed. (Patient not taking: Reported on 1/15/2025), Disp: 20 tablet, Rfl: 0    ondansetron (ZOFRAN-ODT) 4 MG TbDL, Take 1 tablet (4 mg total) by mouth every 8 (eight) hours as needed (nausea and vomiting)., Disp: 14 tablet, Rfl: 0    Current Facility-Administered Medications:     LIDOcaine (PF) 10 mg/ml (1%) injection 10 mg, 1 mL, Intra-articular, 1 time in Clinic/HOD,     triamcinolone acetonide injection 40 mg, 40 mg, Intra-articular, 1 time in Clinic/HOD,   "    Social History:   reports that she has never smoked. She has never been exposed to tobacco smoke. She has never used smokeless tobacco. She reports that she does not currently use alcohol. She reports that she does not use drugs.     Family History   Problem Relation Name Age of Onset    Cancer Maternal Uncle Uncle Luis Eduardo     Diabetes Maternal Grandmother Annie Pimentel       Immunization History   Administered Date(s) Administered    COVID-19 MRNA, LN-S PF (MODERNA HALF 0.25 ML DOSE) 10/25/2021    COVID-19 Vaccine 10/25/2021, 04/11/2022    COVID-19, MRNA, LN-S, PF (MODERNA FULL 0.5 ML DOSE) 02/19/2021, 03/19/2021    Influenza 11/13/2017    Influenza - Quadrivalent 12/14/2016    Influenza - Quadrivalent - High Dose - PF (65 years and older) 10/21/2020, 10/04/2021, 10/11/2022    Influenza - Trivalent - Fluzone High Dose - PF (65 years and older) 11/04/2015, 11/13/2017, 11/12/2018, 11/05/2019    Pneumococcal 11/04/2015    Pneumococcal Conjugate - 13 Valent 07/18/2019    Pneumococcal Polysaccharide - 23 Valent 11/04/2015    Tdap 07/25/2018, 07/25/2018       Review of Systems   Constitutional:  Negative for chills and fever.   Respiratory:  Negative for shortness of breath.    Cardiovascular:  Negative for chest pain.   Musculoskeletal:  Negative for back pain and neck pain.   Neurological:  Negative for tremors and weakness.       Objective:      Physical Exam  Vitals:    01/15/25 0905   BP: 98/64   BP Location: Right arm   Patient Position: Sitting   Pulse: 73   Resp: 12   Temp: 97.5 °F (36.4 °C)   TempSrc: Oral   Weight: 58 kg (127 lb 13.9 oz)   Height: 4' 11" (1.499 m)        Wt Readings from Last 3 Encounters:   01/15/25 58 kg (127 lb 13.9 oz)   10/15/24 60.5 kg (133 lb 4.3 oz)   10/14/24 60.1 kg (132 lb 6.4 oz)       Physical Exam  Vitals and nursing note reviewed.   Constitutional:       General: She is not in acute distress.     Appearance: Normal appearance. She is normal weight.   Cardiovascular:      Rate and " Rhythm: Normal rate and regular rhythm.      Pulses: Normal pulses.      Heart sounds: No murmur heard.  Pulmonary:      Effort: Pulmonary effort is normal. No respiratory distress.   Skin:     General: Skin is warm and dry.   Neurological:      General: No focal deficit present.      Mental Status: She is alert and oriented to person, place, and time.        Body mass index is 25.83 kg/m².    Lab Visit on 10/14/2024   Component Date Value Ref Range Status    Sodium 10/14/2024 141  136 - 145 mmol/L Final    Potassium 10/14/2024 5.2 (H)  3.5 - 5.1 mmol/L Final    Chloride 10/14/2024 110 (H)  98 - 107 mmol/L Final    CO2 10/14/2024 23  23 - 31 mmol/L Final    Glucose 10/14/2024 81 (L)  82 - 115 mg/dL Final    Blood Urea Nitrogen 10/14/2024 18.8  9.8 - 20.1 mg/dL Final    Creatinine 10/14/2024 0.88  0.55 - 1.02 mg/dL Final    Calcium 10/14/2024 11.1 (H)  8.4 - 10.2 mg/dL Final    Protein Total 10/14/2024 7.7 (H)  5.8 - 7.6 gm/dL Final    Albumin 10/14/2024 4.0  3.4 - 4.8 g/dL Final    Globulin 10/14/2024 3.7 (H)  2.4 - 3.5 gm/dL Final    Albumin/Globulin Ratio 10/14/2024 1.1  1.1 - 2.0 ratio Final    Bilirubin Total 10/14/2024 0.4  <=1.5 mg/dL Final    ALP 10/14/2024 71  40 - 150 unit/L Final    ALT 10/14/2024 11  0 - 55 unit/L Final    AST 10/14/2024 16  5 - 34 unit/L Final    eGFR 10/14/2024 >60  mL/min/1.73/m2 Final    Anion Gap 10/14/2024 8.0  mEq/L Final    BUN/Creatinine Ratio 10/14/2024 21   Final    TSH 10/14/2024 0.466  0.350 - 4.940 uIU/mL Final    Thyroxine Free 10/14/2024 1.35  0.70 - 1.48 ng/dL Final    WBC 10/14/2024 8.32  4.50 - 11.50 x10(3)/mcL Final    RBC 10/14/2024 4.22  4.20 - 5.40 x10(6)/mcL Final    Hgb 10/14/2024 13.4  12.0 - 16.0 g/dL Final    Hct 10/14/2024 40.7  37.0 - 47.0 % Final    MCV 10/14/2024 96.4 (H)  80.0 - 94.0 fL Final    MCH 10/14/2024 31.8 (H)  27.0 - 31.0 pg Final    MCHC 10/14/2024 32.9 (L)  33.0 - 36.0 g/dL Final    RDW 10/14/2024 13.0  11.5 - 17.0 % Final    Platelet  10/14/2024 278  130 - 400 x10(3)/mcL Final    MPV 10/14/2024 11.6 (H)  7.4 - 10.4 fL Final    Neut % 10/14/2024 48.1  % Final    Lymph % 10/14/2024 27.6  % Final    Mono % 10/14/2024 8.2  % Final    Eos % 10/14/2024 14.8  % Final    Basophil % 10/14/2024 1.1  % Final    Lymph # 10/14/2024 2.30  0.6 - 4.6 x10(3)/mcL Final    Neut # 10/14/2024 4.00  2.1 - 9.2 x10(3)/mcL Final    Mono # 10/14/2024 0.68  0.1 - 1.3 x10(3)/mcL Final    Eos # 10/14/2024 1.23 (H)  0 - 0.9 x10(3)/mcL Final    Baso # 10/14/2024 0.09  <=0.2 x10(3)/mcL Final    Imm Gran # 10/14/2024 0.02  0 - 0.04 x10(3)/mcL Final    Imm Grans % 10/14/2024 0.2  % Final    NRBC% 10/14/2024 0.0  % Final     Assessment:   Primary Hyperparathyroidism with hypercalcemia  Osteoporeosis  Hypothyroidism     Plan  -DEXA last done 2022 with osteopenia, patient with wrist fracture in past  -Cont Prolia every 6 months.  -Vitamin D 60 at last check, continues low-dose over-the-counter supplementation.  Will recheck today  -last TSH 0.466.. Continue Levothyroxine 68.5 mcg daily.  -Still does not want to pursue surgery for Primary Hyperparathyroidism.  -repeat DEXA scan ordered.      Ordered today: Renal function panel. TSH, Vitamin D, DEXA.       Disposition: RTC in 9 months, nurse visit in 3 months for Prolia injection.      Dante Moncada MD  [facility] Internal Medicine PGY-2

## 2025-01-29 ENCOUNTER — OFFICE VISIT (OUTPATIENT)
Dept: ORTHOPEDICS | Facility: CLINIC | Age: 80
End: 2025-01-29
Payer: MEDICARE

## 2025-01-29 VITALS
SYSTOLIC BLOOD PRESSURE: 111 MMHG | DIASTOLIC BLOOD PRESSURE: 69 MMHG | HEART RATE: 74 BPM | HEIGHT: 59 IN | WEIGHT: 128.38 LBS | TEMPERATURE: 98 F | BODY MASS INDEX: 25.88 KG/M2

## 2025-01-29 DIAGNOSIS — M17.0 PRIMARY OSTEOARTHRITIS OF BOTH KNEES: Primary | ICD-10-CM

## 2025-01-29 PROCEDURE — 99213 OFFICE O/P EST LOW 20 MIN: CPT | Mod: PBBFAC,25

## 2025-01-29 PROCEDURE — 20610 DRAIN/INJ JOINT/BURSA W/O US: CPT | Mod: 50,PBBFAC

## 2025-01-29 RX ORDER — LIDOCAINE HYDROCHLORIDE 10 MG/ML
5 INJECTION, SOLUTION EPIDURAL; INFILTRATION; INTRACAUDAL; PERINEURAL
Status: DISPENSED | OUTPATIENT
Start: 2025-01-29

## 2025-01-29 RX ORDER — TRIAMCINOLONE ACETONIDE 40 MG/ML
40 INJECTION, SUSPENSION INTRA-ARTICULAR; INTRAMUSCULAR ONCE
Status: DISPENSED | OUTPATIENT
Start: 2025-01-29

## 2025-01-29 NOTE — PROGRESS NOTES
Faculty Attestation: Mikala Thompson  was seen in Sports Medicine Clinic Patient seen and evaluated at the time of the visit. History of Present Illness, Physical Exam, and Assessment and Plan reviewed.     Treatment plan is reasonable and appropriate. Compliance with treatment recommendations is important.      No imaging studies were done today.     Procedure note reviewed. Present for entire procedure with the resident. Patient tolerated procedure well.     Polo Venegas MD  Sports Medicine

## 2025-01-29 NOTE — PROGRESS NOTES
Subjective:    Patient ID: Mikala Thompson is a 79 y.o. female  who presented to [facility] for follow up..      Chief Complaint: Follow-up of the Left Knee (F/u on left knee pain. Patient states she is in pain today in her left knee, pain radiates across the knee and also down the shin. Takes tylenol for pain as needed. Requesting cortisone injection for left knee. ) and Follow-up of the Right Knee (F/u on right knee pain. Patient states she is in pain today in her right knee, pain radiates across the knee and also down the shin. Takes tylenol for pain as needed. Requesting cortisone injection. )      History of Present Illness:  HPI    Mikala Thompson who has a history of b/l knee osteoarthritis presented today with with knee pain involving both knees for the past 2 months. She was last seen on 7/24/24 and received Right anserine bursa CSI and previously seen 7/11/24 for left pes anserine CSI. Pain is located anterior. Quality of pain is described as Aching.  Inciting event: none known. Pain is aggravated by  no inciting factors .  Patient has had prior knee problems. Evaluation to date: plain films. Treatment to date: Treatment to date:  Right inferomedial genicular nerve block (June 26, 2024-provided her 1-2 weeks of pain relief), compression with ace wrap, bracing, topical analgesics, oral analgesics, knee CSI (last performed on 4/2023 with relief for 2-3 months), right pes anserine bursa CSI (last performed on June 11, 2024-which provided her 90% of pain relief), left pes anserine bursa CSI on 7/24/24,  VSI (3rd bilateral knee Euflexxa on 6/12/2024), PT (last year for a total of 6 weeks, 2 sessions per week, with no relief), and home exercises (daily for the past 2 weeks) . Expectations for today's visit includes pain relieve.  Occupation includes currently unemployed. PCP is Dr. Nahid Lazcano DO.    Knee Review of Systems:  Swelling?  no  Instability?   "no  Mechanical sx?  no  <30 min AM stiffness? no  Limited ROM? no  Fever/Chills? no    Current Choice of Exercise:  Walking       Objective:      Physical Exam:    /69 (BP Location: Right arm, Patient Position: Sitting)   Pulse 74   Temp 98 °F (36.7 °C) (Oral)   Ht 4' 11" (1.499 m)   Wt 58.2 kg (128 lb 6.4 oz)   BMI 25.93 kg/m²       Appearance:  Normal gait/station  FWB  Alignment: Left: normal Right: normal   Soft tissue swelling: Left: no Right: no  Effusion: Left:  Negative Right: Negative  Erythema: Left no Right: no  Ecchymosis: Left: no Right: no  Atrophy: Left: no Right: no    Palpation:  Knee Tenderness: Left: Patella Right: Patella    Range of motion:  Flexion (140): Left:  140 Right: 140  Extension (0): Left: 0 Right: 0    Strength:  Extension: Left 5/5  Pain: no     Right 5/5 Pain: no  Flexion: Left 5/5 Pain: no Right   5/5 Pain: no        Special Tests:  Ballotable Effusion:Left: Negative Right: Negative   Fluid Wave: Left: Negative Right: Negative   Crepitus: Left: Negative Right: Negative   Patellar grind test: Left: Negative  Right: Negative  Apprehension test:  Left: Negative Right: Negative   Varus: @ 0, Left Negative Right: Negative.  @ 30, Left Negative  Right Negative   Valgus: @ 0, Left Negative Right: Negative.  @ 30, Left Negative  Right Negative  Lachman: Left: Negative Right: Negative   Ant Drawer: Left: Negative Right: Negative   Posterior Drawer: Left: Negative Right: Negative   Dial Test: Left: Not performed Right: Not performed   Payam: Left: Not performed Right: Not performed   Apley's: Left: Not performed Right: Not performed  Thessaly's: Left: Not performed Right: Not performed   Noble Compression: Left: Not performed Right: Not performed   Marcial: Left: Not performed Right: Not performed       General appearance: NAD  Peripheral pulses: normal bilaterally   Reflexes: Left: normal Right normal   Sensation: normal    Labs:  Last A1c: The patient doesn't have any registry " metric data available     Imaging:   Previous images reviewed.  X-rays ordered and performed today: no  # of views: 4 Laterality: bilateral  My Interpretation:  shows DJD changes, likely chronic     Assessment:          1. Primary osteoarthritis of both knees          Plan:         MDM: Prior external referring provider notes reviewed. Prior external referring provider studies reviewed.   Dx: bilateral Knee Osteoarthritis, chronic with acute moderate exacerbation  Treatment Plan: Discussed with patient diagnosis, prognosis, and treatment recommendations. Education provided.    Over the counter NSAID and/or tylenol provided you do not have contraindications such as but not limited to liver or kidney disease or uncontrolled blood pressure. If you're doctors have told you to to not take them based on your health, do not take them.   Using a brace provided to you here or from your local pharmacy or durable medical equipment (DME) store.   Imaging: prior radiological studies independently reviewed; discussed with patient; agree with radiologist interpretation.   Weight Management: is paramount. Recommend at least 10 pounds weight loss if your BMI is 25-29.9. A BMI of <24.9 may provide further relief..   Procedure: Discussed CSI/VSI as treatment options; discussed CSI vs VS injections as treatment options; since conservative measures did not improve symptoms patient consented for CSI today. Will send PA for repeat VSI.  Activity: Activity as tolerated; HEP to include aerobic conditioning and strength training with non-painful activity. ROM/STG exercises. Proper footware; assistive devises to avoid limping.   Therapy: No formal therapy  Medication: CONTINUE over-the-counter acetaminophen (Tylenol 1000 mg three times per day as needed)  CONTINUE Voltaren Gel 1% as prescribed  CONTINUE over-the-counter NSAIDs (ibuprofen 200mg three tablets three times a day as needed)  first line treatment with daily acetaminophen. Up to  1000 mg three times daily can be taken; medication precautions given.. Please see your primary care physician for further refills.  RTC: schedule after PA obtained; schedule for once a week for 3 weeks and then 6 months for follow-up appointment.         Large Joint Aspiration/Injection: bilateral knee    Date/Time: 1/29/2025 9:30 AM    Performed by: Alex Christianson MD  Authorized by: Alex Christianson MD    Consent Done?:  Yes (Written)  Indications:  Arthritis  Timeout: prior to procedure the correct patient, procedure, and site was verified    Anesthesia:  Local infiltration  Local anesthetic:  Lidocaine 1% without epinephrine  Anesthetic total (ml):  10      Details:  Needle Size:  21 G  Ultrasonic Guidance for needle placement?: No    Approach:  Anterolateral  Location:  Knee  Laterality:  Bilateral  Site:  Bilateral knee  Patient tolerance:  Patient tolerated the procedure well with no immediate complications     Staff:      Risks:  Possible complications with the injection include bleeding, infection (.01%), tendon rupture, steroid flare, fat pad or soft tissue atrophy, skin depigmentation, allergic reaction to medications and vasovagal response. (steroid flare treatment is rest, ice, NSAIDs and resolves in 24-36 hours.)     Consent:  No absolute contraindications (cellulitis overlying joint, infection, lack of informed consent, allergy to injection medication, AVN protein or egg allergy for sodium hyaluronate, or history of steroid flare) or relative contraindications (uncontrolled DM2 A1c>10, coagulopathy, INR > 3.5, previous joint replacement or history of AVN).         Description:  The patient was prepped in normal sterile fashion use of chlorhexidine scrub and the appropriate and anatomic landmarks were identified.  Contents of syringe included: 5cc of 1% of lidocaine with 40mg of Kenalog administered to bilateral knees     Post Procedure: Patient alert, and moving all extremities. ROM improved, pain  decreased.  Good peripheral pulses, no signs of vascular compromise and range of motion intact.  Aftercare instructions were given to patient at time of discharge.  Relative rest for 3 days-avoiding excess activity.  Place ice on the area for 15 minutes every 4-6 hours. Patient may take Tylenol a 1000 mg b.i.d. or ibuprofen 600 mg t.i.d. for the next 3-4 days if not on medication already and safe to take pending co-morbidities.  Protect the area for the next 1-8 hours if anesthetic was used.  Avoid excessive activity for the next 3-4 weeks.  ER precautions given for fever, severe joint pain or allergic reaction or other new symptoms related to the joint injection.

## 2025-01-31 ENCOUNTER — TELEPHONE (OUTPATIENT)
Dept: ORTHOPEDICS | Facility: CLINIC | Age: 80
End: 2025-01-31

## 2025-01-31 NOTE — TELEPHONE ENCOUNTER
----- Message from Polo Venegas MD sent at 1/29/2025 10:42 AM CST -----  Regarding: Viscosupplementation Prior Auth  Please obtain prior authorization for viscosupplementation.  After obtaining, schedule a patient for procedure only appointment as appropriate with [facility]  (Euflexxa/Orthovisc/Hyalgan/Synvisc 1 visit per week for 3 weeks or Durolane 1 visit)    Laterality: bilateral  Estimated Return to Clinic:  ASAP (procedure only)    6 weeks (established visit)

## 2025-02-01 NOTE — PROGRESS NOTES
[facility]    RTC 6 Months DFE, OCT done today.   Last edited by Saranya Dove on 2/3/2025  8:14 AM.        Assessment /Plan     For exam results, see Encounter Report.    Anomalous optic nerve    Posterior capsular opacification non visually significant of both eyes    Esotropia        ANCILLARY TESTING     OCT RNFL  8/2/24:  OD 76 thinning IN (unreliable unable to find cup)  OS 78 all green   2/29/24:   OD 81 thinning IN (unreliable unable to find cup)  OS 78 all green   8/31/2023:   OD: thinning nasally, unreliable   OS: unreliable due to blockage superiorly    02/03/2025  OD: 76, sup green, yel n, inf red, white T ss 8/10  OS: 80, all green ss 8/10  Stable OU     HVF 24-2  - 7/19/22  OD: 2/10 FL 0% FP 0% FN, nonspecific scattered deficits  OS: 1/10 FL 4% FP 0% FN, full field  - 11/18/22  OD: FL 0/11, FP 5%, FN 0%, few central defects  OS: FL 9/11, FP 0%, FN 7%, enlarged blind spot/dense defect temporally     Steve 6/13/24  OD: 43.36/45.15x056, regular astigmatism total 1.79D  OS: 43.66/45.26x076, regular astigmatism total 1.6D       OCT MAC   08/02/2024  OD: Normal Foveal Contour, No IRF/SRF  OS: Normal Foveal Contour, No IRF/SRF  Impression: No pathology noted, Stable      1. Myopia OU with posterior staphyloma OS, esotropia alternating  - Patient with long standing myopia. Does have hx of thyroid disease but not c/w restrictive disease.   - On further questioning she reports her left eye has always been weaker than her right eye. Esotropia started after cataract surgery OS.   - ~25PD ET in primary gaze stable. Alternating  - s/p strab surgery with palmer 1/26/24  - 2/29/24 doing well, has fu visits in next few weeks, Will cont to follow  - 6/13/24: Still has residual ET about 15PD  - 02/03/2025: doing well, no double vision. 18PD ET.  CTM    2. Anomalous nerves  - previously open angle with borderline findings  - Based on CDR asymmetry  - CCT thin OS>OD  - Gonio open to CBB  - OCT RNFL 1/25/22: IN thinning OD, full  OS  2/292/24 81 IN thinning, 78 all green   - HVF   7/2022 essentially full with nonspecific deficits OD (does not correlate with OCT thinning)  11/2022: full with nonspecific central defects right eye // left eye unreliable due to high FL  -2/29/24: still poor scan in OD due to tilting, OS full octn. Iop remains wnl. Very small cups ou, Will stop OCTN/VF as this time, no suspicion for glaucoma   - 02/03/2025: crowded disc OD, 0.1 OS. Not suspicious for glaucoma given small cup discs. OCTN testing stable. Fundus photo taken today (note misspelling of name, use MRN to search harmony).   - continue to monitor with yearly DFE and OCTMac. D/w Dr. Rene.     3. Blepharitis and Keratoconjunctivitis sicca OU  - With complaint of burning most hours of the day  - Did not notice improvement with lotemax, restasis, xiidra, BCL, Avenova, punctal plugs  - Has punctal plug in place OS, absent OD  - Lab work: SSA, SSB, YAW, RF, ACE/lysozyme, scleroderma, Anti-smith, CRP/ESR all within normal limits  - Current regimen: PFATs q3 hours, WC & LS 1-2 times/day, genteal   - no dryness at this time  - Continue to Monitor     4. Bilateral lower eyelid laxity, trace involutional ectropion  - May be contributing to eye surface symptoms  - Will treat surface as above & defer any surgery until after possible cataract surgery and Dr. Woodard strabismus eval  - 02/03/2025: will continue to monitor, no PEE at this time.     5. Pseudophakia OS (10/8/20)  6. PCO OS   - BCVA 20/25 11/18/22  - 2/29/24: starting to see temporal crescent in OS, with new ealry pco temporally, dfe otherwise wnl. RD precautions discussed but would hold off on yag at this time   - 02/03/2025: PCO trace in visual axis, patient notes constant blurring OS that does not improve on drops. VA ok at 20/25. Will evaluate in six months for PCO OS and possible yag cap (discussed with patient, is interested). If continues to be not visually significant will space visit to one year  (and get DFE, octn at that time).     7. Pseudophakia, right eye (07/02/2024)  8. Trace PCO OD  - s/p phaco/IOL OD (DOS: 7/2/24)   - POW#1: Doing well, wound Candy negative, IOP controlled, pt happy with vision  - POM#1: doing well. IOP controlled. Happy with vision. BCVA 20/25 // 20/30. Off all drops. Mrx provided   - 02/03/2025: presents for six month dfe. No symptoms, improved vision to 20/25    RTC 6 months MRX, possible PCO eval OS.

## 2025-02-03 ENCOUNTER — OFFICE VISIT (OUTPATIENT)
Dept: OPHTHALMOLOGY | Facility: CLINIC | Age: 80
End: 2025-02-03
Payer: MEDICARE

## 2025-02-03 VITALS — HEIGHT: 59 IN | BODY MASS INDEX: 24.19 KG/M2 | WEIGHT: 120 LBS

## 2025-02-03 DIAGNOSIS — H50.00 ESOTROPIA: ICD-10-CM

## 2025-02-03 DIAGNOSIS — H26.493 POSTERIOR CAPSULAR OPACIFICATION NON VISUALLY SIGNIFICANT OF BOTH EYES: ICD-10-CM

## 2025-02-03 DIAGNOSIS — H53.15 VISUAL DISTORTIONS OF SHAPE AND SIZE: ICD-10-CM

## 2025-02-03 DIAGNOSIS — Q07.8 ANOMALOUS OPTIC NERVE: Primary | ICD-10-CM

## 2025-02-03 PROCEDURE — 92133 CPTRZD OPH DX IMG PST SGM ON: CPT | Mod: PBBFAC,PN | Performed by: OPHTHALMOLOGY

## 2025-02-03 PROCEDURE — 99213 OFFICE O/P EST LOW 20 MIN: CPT | Mod: PBBFAC,PN

## 2025-02-03 PROCEDURE — 92133 CPTRZD OPH DX IMG PST SGM ON: CPT | Mod: PBBFAC,PN

## 2025-02-05 ENCOUNTER — TELEPHONE (OUTPATIENT)
Dept: ENDOCRINOLOGY | Facility: CLINIC | Age: 80
End: 2025-02-05
Payer: MEDICARE

## 2025-02-05 NOTE — TELEPHONE ENCOUNTER
After reading note patient is to receive prolia Mar 16, and to [facility] Sep 26 with labs prior.    Patient made aware of the above.

## 2025-02-05 NOTE — TELEPHONE ENCOUNTER
----- Message from Nidia sent at 2/5/2025 11:17 AM CST -----  Pt of Sara Cardona requesting a call back concerning labs.      Pt call back number  [phone]      Please advise

## 2025-02-11 ENCOUNTER — OFFICE VISIT (OUTPATIENT)
Dept: GYNECOLOGY | Facility: CLINIC | Age: 80
End: 2025-02-11
Payer: MEDICARE

## 2025-02-11 VITALS
DIASTOLIC BLOOD PRESSURE: 74 MMHG | RESPIRATION RATE: 20 BRPM | HEART RATE: 74 BPM | OXYGEN SATURATION: 100 % | WEIGHT: 125.81 LBS | HEIGHT: 59 IN | TEMPERATURE: 99 F | BODY MASS INDEX: 25.36 KG/M2 | SYSTOLIC BLOOD PRESSURE: 123 MMHG

## 2025-02-11 DIAGNOSIS — Z01.419 ENCOUNTER FOR ANNUAL ROUTINE GYNECOLOGICAL EXAMINATION: Primary | ICD-10-CM

## 2025-02-11 DIAGNOSIS — L20.9 ATOPIC DERMATITIS, UNSPECIFIED TYPE: ICD-10-CM

## 2025-02-11 PROCEDURE — 3074F SYST BP LT 130 MM HG: CPT | Mod: CPTII,,, | Performed by: NURSE PRACTITIONER

## 2025-02-11 PROCEDURE — G0101 CA SCREEN;PELVIC/BREAST EXAM: HCPCS | Mod: PBBFAC | Performed by: NURSE PRACTITIONER

## 2025-02-11 PROCEDURE — 3078F DIAST BP <80 MM HG: CPT | Mod: CPTII,,, | Performed by: NURSE PRACTITIONER

## 2025-02-11 PROCEDURE — 99214 OFFICE O/P EST MOD 30 MIN: CPT | Mod: PBBFAC | Performed by: NURSE PRACTITIONER

## 2025-02-11 PROCEDURE — 3288F FALL RISK ASSESSMENT DOCD: CPT | Mod: CPTII,,, | Performed by: NURSE PRACTITIONER

## 2025-02-11 PROCEDURE — G0101 CA SCREEN;PELVIC/BREAST EXAM: HCPCS | Mod: S$PBB,,, | Performed by: NURSE PRACTITIONER

## 2025-02-11 PROCEDURE — 1101F PT FALLS ASSESS-DOCD LE1/YR: CPT | Mod: CPTII,,, | Performed by: NURSE PRACTITIONER

## 2025-02-11 PROCEDURE — 1159F MED LIST DOCD IN RCRD: CPT | Mod: CPTII,,, | Performed by: NURSE PRACTITIONER

## 2025-02-11 RX ORDER — TRIAMCINOLONE ACETONIDE 1 MG/G
CREAM TOPICAL 2 TIMES DAILY
Qty: 28 G | Refills: 0 | Status: SHIPPED | OUTPATIENT
Start: 2025-02-11 | End: 2025-02-18

## 2025-02-11 NOTE — PROGRESS NOTES
"[facility] -  Gynecology / Women's Health Clinic      Subjective:       Patient ID: Mikala Thompson is a 79 y.o. female.    Chief Complaint:  Gynecologic Exam    History of Present Illness  Pt is  here for annual gyn. Pt is postmenopausal. Denies vaginal bleeding or discharge. Denies urinary complaints and hot flashes. Pt c/o occ. vaginal external itching x1 month, scented soap and scented washing powder. Denies hx of abnormal pap. Pt had pap smears done here , , . All negative for lesion or malignancy. Last pap 2015-NIL and HPV (-). MG-11/10/22-BIRADS 1. Pt denies fly hx of breast, ovarian, uterine or colon cancer. DEXA -osteopenia. Pt does not use tobacco.     GYN & OB History  No LMP recorded. Patient is postmenopausal.   Date of Last Pap: 10/14/2015    Review of patient's allergies indicates:  No Known Allergies  Past Medical History:   Diagnosis Date    Amblyopia     Benign paroxysmal vertigo, bilateral     Cataract     Glaucoma     Hyperlipidemia     Hypertension     Hypothyroidism     Hypothyroidism, unspecified     Osteoarthritis     Strabismus      OB History    Para Term  AB Living   4 4           SAB IAB Ectopic Multiple Live Births                  # Outcome Date GA Lbr Paras/2nd Weight Sex Type Anes PTL Lv   4 Para            3 Para            2 Para            1 Para                 Review of Systems  Review of Systems    Negative except for pertinent findings for positives per HPI     Objective:    Physical Exam    /74 (BP Location: Left arm, Patient Position: Sitting)   Pulse 74   Temp 98.8 °F (37.1 °C) (Oral)   Resp 20   Ht 4' 11" (1.499 m)   Wt 57.1 kg (125 lb 12.8 oz)   SpO2 100%   BMI 25.41 kg/m²   GENERAL: Well-developed female. No acute distress.    SKIN: Normal to inspection, warm and intact.  BREASTS: No rashes or erythema. No masses, lumps, discharge, tenderness.  VULVA: General appearance normal; external genitalia with " no lesions or erythema.  VAGINA: Mucosa/vaginal vault atrophic, no abnormal discharge or lesions.  CERVIX: atrophic, nulliparous appearing os, no erythema or abnormal discharge.  BIMANUAL EXAM: reveals a 8 week-sized uterus. The uterus is non tender. Derrek adnexa reveal no tenderness.  PSYCHIATRIC: Patient is oriented to person, place, and time. Mood and affect are normal.    Assessment:         ICD-10-CM ICD-9-CM   1. Encounter for annual routine gynecological examination  Z01.419 V72.31   2. Atopic dermatitis, unspecified type  L20.9 691.8     Plan:   Mikala was seen today for gynecologic exam.    Diagnoses and all orders for this visit:    Encounter for annual routine gynecological examination    Atopic dermatitis, unspecified type  -     triamcinolone acetonide 0.1% (KENALOG) 0.1 % cream; Apply topically 2 (two) times daily. Apply small amount to affected external area. for 7 days    Pelvic today, pap deferred d/t age over 65 and adequate screening.  Pt had decided to defer future pelvic exams. Will call if she has any concerns.  Pt will defer Mammograms per USPSTF recommendations of MG up to age 74.  Follow up in about 1 year (around 2/11/2026) for annual exam.

## 2025-02-13 NOTE — TELEPHONE ENCOUNTER
Patient approved for SHANTA knee Euflexxa. Auth: [id number] Good from 2/13/25-4/1/25. Please schedule patient with [facility]. Thanks.

## 2025-03-06 ENCOUNTER — OFFICE VISIT (OUTPATIENT)
Dept: ORTHOPEDICS | Facility: CLINIC | Age: 80
End: 2025-03-06
Payer: MEDICARE

## 2025-03-06 VITALS
DIASTOLIC BLOOD PRESSURE: 73 MMHG | BODY MASS INDEX: 25.34 KG/M2 | WEIGHT: 125.69 LBS | HEIGHT: 59 IN | HEART RATE: 73 BPM | SYSTOLIC BLOOD PRESSURE: 121 MMHG

## 2025-03-06 DIAGNOSIS — M17.0 PRIMARY OSTEOARTHRITIS OF BOTH KNEES: Primary | ICD-10-CM

## 2025-03-06 PROCEDURE — 99213 OFFICE O/P EST LOW 20 MIN: CPT | Mod: PBBFAC | Performed by: SURGERY

## 2025-03-06 PROCEDURE — 20611 DRAIN/INJ JOINT/BURSA W/US: CPT | Mod: 50,PBBFAC | Performed by: SURGERY

## 2025-03-06 NOTE — PROGRESS NOTES
Bilateral knee injection (Euflexxa #1/3)    Date/Time: 3/6/2025 2:40 PM    Performed by: Polo Venegas MD  Authorized by: Polo Venegas MD    Consent Done?:  Yes (Written)  Indications:  Arthritis  Timeout: prior to procedure the correct patient, procedure, and site was verified      Local anesthesia used?: Yes    Local anesthetic:  Topical anesthetic    Details:  Needle Size:  21 G  Ultrasonic Guidance for needle placement?: Yes    Images are saved and documented.  Approach:  Anterolateral  Location:  Knee  Laterality:  Bilateral  Site:  Bilateral knee  Patient tolerance:  Patient tolerated the procedure well with no immediate complications     Staff Attending: Polo Venegas MD    Risks:  Possible complications with the injection include bleeding, infection (.01%), tendon rupture, steroid flare, fat pad or soft tissue atrophy, skin depigmentation, allergic reaction to medications and vasovagal response. (steroid flare treatment is rest, ice, NSAIDs and resolves in 24-36 hours.)    Consent:  No absolute contraindications (cellulitis overlying joint, infection, lack of informed consent, allergy to injection medication, AVN protein or egg allergy for sodium hyaluronate, or history of steroid flare) or relative contraindications (uncontrolled DM2 A1c>10, coagulopathy, INR > 3.5, previous joint replacement or history of AVN).        Description:  The patient was prepped in normal sterile fashion use of chlorhexidine scrub and the appropriate and anatomic landmarks were identified without image guidance. Care was taken to ensure there was unrestricted flow of syringe contents (listed below) into the site of injection.      Body mass index is 25.38 kg/m².     Contents of syringe included: 2mL of 10mg/ml of Euflexxa Injected.     Post Procedure: Patient alert, and moving all extremities. ROM improved, pain decreased.  Good peripheral pulses, no signs of vascular compromise and range of motion intact.  Aftercare  instructions were given to patient at time of discharge.  Relative rest for 3 days-avoiding excess activity.  Place ice on the area for 15 minutes every 4-6 hours. Patient may take Tylenol a 1000 mg b.i.d. or ibuprofen 600 mg t.i.d. for the next 3-4 days if not on medication already and safe to take pending co-morbidities.  Protect the area for the next 1-8 hours if anesthetic was used.  Avoid excessive activity for the next 3-4 weeks.  ER precautions given for fever, severe joint pain or allergic reaction or other new symptoms related to the joint injection.

## 2025-03-13 ENCOUNTER — OFFICE VISIT (OUTPATIENT)
Dept: ORTHOPEDICS | Facility: CLINIC | Age: 80
End: 2025-03-13
Payer: MEDICARE

## 2025-03-13 VITALS
OXYGEN SATURATION: 98 % | HEIGHT: 59 IN | BODY MASS INDEX: 25.8 KG/M2 | RESPIRATION RATE: 18 BRPM | WEIGHT: 128 LBS | DIASTOLIC BLOOD PRESSURE: 72 MMHG | SYSTOLIC BLOOD PRESSURE: 115 MMHG | HEART RATE: 67 BPM | TEMPERATURE: 98 F

## 2025-03-13 DIAGNOSIS — M17.0 PRIMARY OSTEOARTHRITIS OF BOTH KNEES: Primary | ICD-10-CM

## 2025-03-13 PROCEDURE — 20611 DRAIN/INJ JOINT/BURSA W/US: CPT | Mod: 50,PBBFAC | Performed by: SURGERY

## 2025-03-13 PROCEDURE — 99214 OFFICE O/P EST MOD 30 MIN: CPT | Mod: PBBFAC | Performed by: SURGERY

## 2025-03-13 RX ORDER — LIDOCAINE HYDROCHLORIDE 10 MG/ML
5 INJECTION, SOLUTION EPIDURAL; INFILTRATION; INTRACAUDAL; PERINEURAL
Status: COMPLETED | OUTPATIENT
Start: 2025-03-13 | End: 2025-03-13

## 2025-03-13 RX ADMIN — LIDOCAINE HYDROCHLORIDE 50 MG: 10 INJECTION, SOLUTION EPIDURAL; INFILTRATION; INTRACAUDAL; PERINEURAL at 10:03

## 2025-03-13 RX ADMIN — Medication 20 MG: at 10:03

## 2025-03-13 NOTE — PROGRESS NOTES
"Subjective:    Patient ID: Mikala Thompson is a 79 y.o. female  who presented to [facility] for follow up. of bilateral knee pain.      Chief Complaint: Injections of the Left Knee and Injections of the Right Knee      History of Present Illness:  HPI    Mikala Thompson presented today with for follow up on a knee problem involving both knees for the past few years. She was last evaluated by Dr Christianson and myself on 1/29/25 when she received bilateral knee CSI. She reports good symptom relief since the CSI, pain controlled without significantly affecting her ADLs. She was most recently seen in this clinic and received the first set of scheduled Euflexxa injections for bilateral knees on 3/6/25. She reports no complications and would like to proceed with second set of Euflexxa injections today.    Knee Review of Systems:  Swelling?  No  Instability?  No  Mechanical sx?  No  <30 min AM stiffness? No  Limited ROM? No  Fever/Chills? No    ROS       Objective:      Physical Exam:    /72   Pulse 67   Temp 98 °F (36.7 °C) (Oral)   Resp 18   Ht 4' 11" (1.499 m)   Wt 58.1 kg (128 lb)   SpO2 98%   BMI 25.85 kg/m²     Ortho/SPM Exam    Appearance:  Normal gait/station  FWB  Alignment: Left: mild varus Right: mild varus   Soft tissue swelling: Left: no Right: no  Effusion: Left:  Negative Right: Negative  Erythema: Left no Right: no  Ecchymosis: Left: no Right: no  Atrophy: Left: no Right: no    Palpation:  Knee Tenderness: Left: Medial joint line Right: Medial joint line    Range of motion:  Flexion: Left:  140 Right: 140  Extension: Left: 0 Right: 0    Strength:  L - Strength:  Extension - 5/5  Pain: No    Flexion - 5/5 Pain: No   R- Strength:  Extension - 5/5 Pain: No     Flexion - 5/5 Pain: No      Special Tests:  Ballotable Effusion:Left: Negative Right: Negative   Fluid Wave: Left: Negative Right: Negative   Crepitus: Left: Positive Right: Positive   Patellar " grind test: Left: Not performed  Right: Not performed  Apprehension test: Left: Negative Right: Negative   Varus: Left Negative @ 0 Negative @ 30 Right: Negative @ 0 Negative @ 30  Valgus: Left Positive @ 0 Positive @ 30 Right: Positive @ 0 Positive @ 30  Lachman: Left: Negative Right: Negative   Ant Drawer: Left: Negative Right: Negative   Posterior Drawer: Left: Negative Right: Negative   Dial Test: Left: Negative Right: Negative     General appearance: NAD  Peripheral pulses: normal bilaterally   Reflexes: Left: normal Right normal   Sensation: normal    Labs:  Last A1c: The patient doesn't have any registry metric data available     Imaging:   Previous images reviewed.  X-rays ordered and performed today: No  # of views: 4 Laterality: bilateral knee 5/1/24  My Interpretation:  shows DJD changes, likely chronic     Assessment:        Encounter Diagnosis   Name Primary?    Primary osteoarthritis of both knees Yes        Plan:       Dx: bilateral Knee Osteoarthritis. Acute in moderate exacerbation.   Treatment Plan: Discussed with patient diagnosis, prognosis, and treatment recommendations. Education provided.    Imaging: prior radiological studies independently reviewed; discussed with patient; agree with radiologist interpretation.   Weight Management: is paramount. Recommend at least 10 pounds weight loss if your BMI is 25-29.9. A BMI of <24.9 may provide further relief..   Procedure: Discussed CSI/VSI as treatment options; discussed injections as treatment options; since conservative measures did not improve symptoms patient consented for continuing VS series today.  Activity: Activity as tolerated; HEP to include aerobic conditioning and strength training with non-painful activity. ROM/STG exercises. Proper footware; assistive devises to avoid limping.   Therapy: Home Exercise Program (HEP)  Medication: CONTINUE over-the-counter acetaminophen (Tylenol 1000 mg three times per day as needed)  CONTINUE  over-the-counter NSAIDs (ibuprofen 200mg three tablets three times a day as needed). Please see your primary care physician for further refills.  RTC: in 1 week for follow-up bilateral knee VSI (#3/3 Euflexxa).        Bilateral supra patellar bursa injection (Euflexxa #2/3)    Date/Time: 3/13/2025 9:20 AM    Performed by: Polo Venegas MD  Authorized by: Polo Venegas MD    Consent Done?:  Yes (Written)  Indications:  Arthritis  Timeout: prior to procedure the correct patient, procedure, and site was verified      Local anesthesia used?: Yes    Anesthesia:  Local infiltration  Local anesthetic:  Topical anesthetic and lidocaine 1% without epinephrine  Anesthetic total (ml):  5      Details:  Needle Size:  21 G  Ultrasonic Guidance for needle placement?: Yes    Images are saved and documented.  Approach:  Lateral  Location:  Knee  Laterality:  Bilateral  Site:  Bilateral supra patellar bursa  Patient tolerance:  Patient tolerated the procedure well with no immediate complications     Staff Attending: Polo Venegas MD    Risks:  Possible complications with the injection include bleeding, infection (.01%), tendon rupture, steroid flare, fat pad or soft tissue atrophy, skin depigmentation, allergic reaction to medications and vasovagal response. (steroid flare treatment is rest, ice, NSAIDs and resolves in 24-36 hours.)    Consent:  No absolute contraindications (cellulitis overlying joint, infection, lack of informed consent, allergy to injection medication, AVN protein or egg allergy for sodium hyaluronate, or history of steroid flare) or relative contraindications (uncontrolled DM2 A1c>10, coagulopathy, INR > 3.5, previous joint replacement or history of AVN).        Description:  The patient was prepped in normal sterile fashion use of chlorhexidine scrub and the appropriate and anatomic landmarks were identified around the KNEE with ultrasound as image guidance. The patient was evaluated with a [employer]  ultrasound machine using a 10 MHz linear probe, following a standard protocol. Ultrasound imaging confirmed placement of the needle in the correct position, with reference to surrounding anatomic structures. A therapeutic and diagnostic injection was targeted at the suprapatellar recess. Sonographic examination of the bilateral knee was performed in real-time using a 10 MHz linear probe. Longitudinal and transverse scans were visualized, and image(s) were obtained from the anterior aspect of the bilateral knee.  Dynamic visualization of the needle was continuous throughout the procedure(s) and maintained good position. The ultrasound image for needle placement was captured and saved in the patient's medical record.  The joint capsule was observed to expand under ultrasound. Care was taken to ensure there was unrestricted flow of syringe contents (listed below) into the site of injection.  US Findings: moderate effusion seen in the superior patellar recess Joint space narrowing in the medial joint space with signs of meniscal damage Joint space narrowing in the lateral joint space with signs meniscal damage.      Indication for use of Ultrasound Guidance: The indication for the use of ultrasound was to ensure accurate localization of needle for aspiration and/or injection, and minimize risk of damage to surrounding structures. This patient also had previous technical difficulty accessing her knee joints anterolaterally due to presence of large osteophytes. Ultrasound used to assist with suprapatellar bursa injection. Cait EL, Brenda S, Vanna LJ, Bao BJ. Improving injection accuracy of the elbow, knee, and shoulder: does injection site and imaging make a difference? A systematic review. Am J Sports Med. 2011 Mar;39(3):656-62. doi: 10.1177/8107063571284231. Epub 2011 Jan 21. PMID: 64848365.    Body mass index is 25.85 kg/m².     Contents of syringe included: 2mL of 10mg/ml of Euflexxa Injected.     Post Procedure:  Patient alert, and moving all extremities. ROM improved, pain decreased.  Good peripheral pulses, no signs of vascular compromise and range of motion intact.  Aftercare instructions were given to patient at time of discharge.  Relative rest for 3 days-avoiding excess activity.  Place ice on the area for 15 minutes every 4-6 hours. Patient may take Tylenol a 1000 mg b.i.d. or ibuprofen 600 mg t.i.d. for the next 3-4 days if not on medication already and safe to take pending co-morbidities.  Protect the area for the next 1-8 hours if anesthetic was used.  Avoid excessive activity for the next 3-4 weeks.  ER precautions given for fever, severe joint pain or allergic reaction or other new symptoms related to the joint injection.            Polo Venegas MD  Sports Medicine

## 2025-03-19 ENCOUNTER — CLINICAL SUPPORT (OUTPATIENT)
Dept: ENDOCRINOLOGY | Facility: CLINIC | Age: 80
End: 2025-03-19
Payer: MEDICARE

## 2025-03-19 DIAGNOSIS — M81.0 OSTEOPOROSIS, UNSPECIFIED OSTEOPOROSIS TYPE, UNSPECIFIED PATHOLOGICAL FRACTURE PRESENCE: Primary | ICD-10-CM

## 2025-03-19 PROCEDURE — 99211 OFF/OP EST MAY X REQ PHY/QHP: CPT | Mod: PBBFAC

## 2025-03-19 PROCEDURE — 96372 THER/PROPH/DIAG INJ SC/IM: CPT | Mod: PBBFAC

## 2025-03-19 RX ADMIN — DENOSUMAB 60 MG: 60 INJECTION SUBCUTANEOUS at 08:03

## 2025-03-20 ENCOUNTER — OFFICE VISIT (OUTPATIENT)
Dept: ORTHOPEDICS | Facility: CLINIC | Age: 80
End: 2025-03-20
Payer: MEDICARE

## 2025-03-20 VITALS
SYSTOLIC BLOOD PRESSURE: 126 MMHG | HEART RATE: 67 BPM | WEIGHT: 129.38 LBS | RESPIRATION RATE: 18 BRPM | OXYGEN SATURATION: 98 % | DIASTOLIC BLOOD PRESSURE: 79 MMHG | BODY MASS INDEX: 26.08 KG/M2 | TEMPERATURE: 99 F | HEIGHT: 59 IN

## 2025-03-20 DIAGNOSIS — M17.0 PRIMARY OSTEOARTHRITIS OF BOTH KNEES: Primary | ICD-10-CM

## 2025-03-20 PROCEDURE — 99215 OFFICE O/P EST HI 40 MIN: CPT | Mod: PBBFAC | Performed by: SURGERY

## 2025-03-20 PROCEDURE — 20611 DRAIN/INJ JOINT/BURSA W/US: CPT | Mod: 50,PBBFAC | Performed by: SURGERY

## 2025-03-20 RX ORDER — LIDOCAINE HYDROCHLORIDE 10 MG/ML
5 INJECTION, SOLUTION EPIDURAL; INFILTRATION; INTRACAUDAL; PERINEURAL
Status: COMPLETED | OUTPATIENT
Start: 2025-03-20 | End: 2025-03-20

## 2025-03-20 RX ADMIN — LIDOCAINE HYDROCHLORIDE 50 MG: 10 INJECTION, SOLUTION EPIDURAL; INFILTRATION; INTRACAUDAL; PERINEURAL at 09:03

## 2025-03-20 RX ADMIN — Medication 20 MG: at 09:03

## 2025-03-20 NOTE — PROGRESS NOTES
Patient returns today for Euflexxa #3/3 series. Previous injections well tolerated. No fevers, no overlying skin redness, swelling, discharge, wounds.  Euflexxa bilateral knee (suprapatellar bursa with US guidance) today #3/3.   RTC in 6 months.    Bilateral knee suprapatellar bursa Euflexxa #3/3    Date/Time: 3/20/2025 8:40 AM    Performed by: Polo Venegas MD  Authorized by: Polo Venegas MD    Consent Done?:  Yes (Written)  Indications:  Arthritis  Timeout: prior to procedure the correct patient, procedure, and site was verified      Local anesthesia used?: Yes    Anesthesia:  Local infiltration  Local anesthetic:  Topical anesthetic and lidocaine 1% without epinephrine    Details:  Needle Size:  21 G  Ultrasonic Guidance for needle placement?: Yes    Images are saved and documented.  Approach:  Lateral  Location:  Knee  Laterality:  Bilateral  Site:  Bilateral supra patellar bursa  Patient tolerance:  Patient tolerated the procedure well with no immediate complications     Staff Attending: Polo Venegas MD    Risks:  Possible complications with the injection include bleeding, infection (.01%), tendon rupture, steroid flare, fat pad or soft tissue atrophy, skin depigmentation, allergic reaction to medications and vasovagal response. (steroid flare treatment is rest, ice, NSAIDs and resolves in 24-36 hours.)    Consent:  No absolute contraindications (cellulitis overlying joint, infection, lack of informed consent, allergy to injection medication, AVN protein or egg allergy for sodium hyaluronate, or history of steroid flare) or relative contraindications (uncontrolled DM2 A1c>10, coagulopathy, INR > 3.5, previous joint replacement or history of AVN).        Description:  The patient was prepped in normal sterile fashion use of chlorhexidine scrub and the appropriate and anatomic landmarks were identified around the KNEE with ultrasound as image guidance. The patient was evaluated with a [employer] ultrasound  machine using a 10 MHz linear probe, following a standard protocol. Ultrasound imaging confirmed placement of the needle in the correct position, with reference to surrounding anatomic structures. A therapeutic and diagnostic injection was targeted at the suprapatellar recess. Sonographic examination of the bilateral knee was performed in real-time using a 10 MHz linear probe. Longitudinal and transverse scans were visualized, and image(s) were obtained from the anterior aspect of the bilateral knee.  Dynamic visualization of the needle was continuous throughout the procedure(s) and maintained good position. The ultrasound image for needle placement was captured and saved in the patient's medical record.  The joint capsule was observed to expand under ultrasound. Care was taken to ensure there was unrestricted flow of syringe contents (listed below) into the site of injection.  US Findings: moderate effusion seen in the superior patellar recess Joint space narrowing in the medial joint space with signs of meniscal damage Joint space narrowing in the lateral joint space with signs meniscal damage.      Indication for use of Ultrasound Guidance: Prior difficulty for this patient, associated with entering knee joint via anterolateral approach, likely due to presence of osteophytes. The indication for the use of ultrasound was to ensure accurate localization of needle for aspiration and/or injection, and minimize risk of damage to surrounding structures. Cait EL, Brenda S, Vanna LJ, Bao BJ. Improving injection accuracy of the elbow, knee, and shoulder: does injection site and imaging make a difference? A systematic review. Am J Sports Med. 2011 Mar;39(3):656-62. doi: 10.1177/6168425455496051. Epub 2011 Jan 21. PMID: 27754488.    Body mass index is 26.14 kg/m².     Contents of syringe included: 2mL of 10mg/ml of Euflexxa Injected.     Post Procedure: Patient alert, and moving all extremities. ROM improved, pain  decreased.  Good peripheral pulses, no signs of vascular compromise and range of motion intact.  Aftercare instructions were given to patient at time of discharge.  Relative rest for 3 days-avoiding excess activity.  Place ice on the area for 15 minutes every 4-6 hours. Patient may take Tylenol a 1000 mg b.i.d. or ibuprofen 600 mg t.i.d. for the next 3-4 days if not on medication already and safe to take pending co-morbidities.  Protect the area for the next 1-8 hours if anesthetic was used.  Avoid excessive activity for the next 3-4 weeks.  ER precautions given for fever, severe joint pain or allergic reaction or other new symptoms related to the joint injection.           Polo Venegas MD  Sports Medicine

## 2025-04-09 DIAGNOSIS — E06.3 HASHIMOTO'S THYROIDITIS: ICD-10-CM

## 2025-04-09 RX ORDER — LEVOTHYROXINE SODIUM 137 UG/1
68.5 TABLET ORAL DAILY
Qty: 45 TABLET | Refills: 1 | Status: SHIPPED | OUTPATIENT
Start: 2025-04-09

## 2025-04-22 RX ORDER — LOSARTAN POTASSIUM 25 MG/1
25 TABLET ORAL DAILY
Qty: 90 TABLET | Refills: 3 | Status: SHIPPED | OUTPATIENT
Start: 2025-04-22

## 2025-04-27 NOTE — PROGRESS NOTES
[facility] Visit    Chief Complaint:        Chief Complaint   Patient presents with    Follow-up    Insomnia         Subjective:     HPI:  Mikala Thompson is a 79 y.o. female who has a past medical history of Amblyopia, Benign paroxysmal vertigo, bilateral, Cataract, Glaucoma, Hyperlipidemia, Hypertension, Hypothyroidism, Hypothyroidism, unspecified, Osteoarthritis, and Strabismus.  She presents to clinic today for follow-up of chronic medical conditions. No new complaints today. Continues to have her chronic knee pain, she follows PRN with sports medicine.  Sx worse in the cold weather. States she takes ibuprofen intermittently (3 times this past week) which does help some, voltaren gel, lidocaine patches with mixed amounts of relief. She follows with endocrine for hypothyroidism, osteoporosis, primary hyperparathyroidism. For hyperparathyroid she has declined surgical intervention, elected for medical management. Prolia q6months for osteoporosis. Denies any chest pain, abd pain, N/V/D, constipation, SOB, fevers, paresthesias. Reports she has been having some difficulty sleeping in the past couple of weeks.  Having some daytime somnolence.  She has been taking 5 mg of melatonin.  She is hesitant to try any other stronger medications as she is concerned L be overly sedating.  Otherwise no new symptoms and patient is no new concerns.       Past Medical History:   Diagnosis Date    Amblyopia     Benign paroxysmal vertigo, bilateral     Cataract     Glaucoma     Hyperlipidemia     Hypertension     Hypothyroidism     Hypothyroidism, unspecified     Osteoarthritis     Strabismus        Past Surgical History:   Procedure Laterality Date    CATARACT EXTRACTION      EYE MUSCLE SURGERY Left 01/26/2024    strabismus sx OS - Dr. Kike Woodard    EYE SURGERY  10/2020    Cataracts    FRACTURE SURGERY Left 06/2020    Wrist    PHACOEMULSIFICATION, CATARACT, WITH IOL INSERTION Right 07/02/2024    Procedure:  PHACOEMULSIFICATION, CATARACT, WITH IOL INSERTION;  Surgeon: Uriah Mcarthur MD;  Location: [facility];  Service: Ophthalmology;  Laterality: Right;  MX60T 2.00 +12.5D    TUBAL LIGATION  2/10/80       Social History     Socioeconomic History    Marital status: Single   Tobacco Use    Smoking status: Never     Passive exposure: Never    Smokeless tobacco: Never   Substance and Sexual Activity    Alcohol use: Never    Drug use: Never    Sexual activity: Not Currently     Partners: Male     Birth control/protection: Abstinence, Post-menopausal     Social Drivers of Health     Financial Resource Strain: Low Risk  (6/3/2024)    Overall Financial Resource Strain (CARDIA)     Difficulty of Paying Living Expenses: Not very hard   Food Insecurity: No Food Insecurity (6/3/2024)    Hunger Vital Sign     Worried About Running Out of Food in the Last Year: Never true     Ran Out of Food in the Last Year: Never true   Transportation Needs: No Transportation Needs (6/3/2024)    TRANSPORTATION NEEDS     Transportation : No   Physical Activity: Inactive (6/3/2024)    Exercise Vital Sign     Days of Exercise per Week: 0 days     Minutes of Exercise per Session: 0 min   Stress: No Stress Concern Present (6/3/2024)    [employer] [place] [employer] - Occupational Stress Questionnaire     Feeling of Stress : Not at all   Housing Stability: Unknown (6/3/2024)    Housing Stability Vital Sign     Unable to Pay for Housing in the Last Year: No     Homeless in the Last Year: No         Current Outpatient Medications   Medication Instructions    amLODIPine (NORVASC) 5 MG tablet See Instructions, Take 1 tablet by mouth once daily, # 90 tab(s), 7 Refill(s), Pharmacy: [id number], 150, cm, Height/Length Dosing, 02/01/22 10:09:00 CST, 64.75, kg, Weight Dosing, 02/01/22 10:09:00 CST    levothyroxine (SYNTHROID) 68.5 mcg, Oral, Daily    losartan (COZAAR) 25 mg, Oral, Daily    pravastatin (PRAVACHOL) 40 MG tablet See  "Instructions, Take 1 tablet by mouth once daily, # 90 tab(s), 5 Refill(s), Pharmacy: [id number], 150, cm, Height/Length Dosing, 02/01/22 10:09:00 CST, 64.75, kg, Weight Dosing, 02/01/22 10:09:00 CST    triamcinolone acetonide 0.1% (KENALOG) 0.1 % cream Topical (Top), 2 times daily, Apply small amount to affected external area.       Review of Systems  Review of Systems   Constitutional:  Negative for chills and fever.   HENT:  Negative for hearing loss and sore throat.    Eyes:  Negative for photophobia, pain and discharge.   Respiratory:  Negative for cough, shortness of breath and wheezing.    Cardiovascular:  Negative for chest pain and palpitations.   Gastrointestinal:  Negative for abdominal pain, blood in stool, constipation, diarrhea, nausea and vomiting.   Genitourinary:  Negative for dysuria, flank pain and hematuria.   Musculoskeletal:  Positive for joint pain (chronic). Negative for falls, myalgias and neck pain.   Skin:  Negative for itching and rash.   Neurological:  Negative for dizziness, tingling, tremors, sensory change, speech change, focal weakness, seizures, loss of consciousness, weakness and headaches.   Endo/Heme/Allergies:  Negative for polydipsia. Does not bruise/bleed easily.   Psychiatric/Behavioral:  The patient has insomnia.        Objective:   Last 24 Hour Vital Signs:  Vitals  BP: 123/85  Temp: 97.9 °F (36.6 °C)  Temp Source: Oral  Pulse: 76  Resp: 16  SpO2: 97 %  Height: 4' 11" (149.9 cm)  Weight: 60.5 kg (133 lb 6.4 oz)       Physical Examination:  Vital signs and nursing notes reviewed.  Constitutional: Patient is in NAD. Awake and alert.    Head: Atraumatic. Normocephalic.  Eyes: Conjunctivae nl. No scleral icterus. Strabismus to left eye.   ENT: Mucous membranes are moist. Oropharynx is clear.  No tonsillar exudates.  Right ear with hard crusted cerumen in external auditory canal, partially obscuring view of TM.  From what I can visualize there was no erythema although " there was a slight effusion.  Nontender.  Left TM within normal limits.  Neck: Supple. Full ROM.    Cardiovascular: Regular rate and rhythm. No murmurs, rubs, or gallops. Distal pulses are 2+ and symmetric .  Pulmonary/Chest: No respiratory distress. Clear to auscultation bilaterally. No wheezing, rales, or rhonchi.  Abdominal: Soft. Non-distended. No TTP. No rebound, guarding, or rigidity.   Musculoskeletal: Moves all extremities. No edema.  Mild area of swelling to just below the medial knee.  Mild tenderness.  Full range of motion of bilateral knees.  No tenderness to the joint lines.  Skin: Warm and dry.  Neurological: Awake and alert. No acute focal neurological deficits are appreciated.        Assessment & Plan:     Hypertension  -well controlled.  -Continue amlodipine 5    Insomnia   Sleep hygiene, melatonin       Hyperlipidemia  -Cholesterol 210,  on 11/15/22.   -Advised patient to avoid fatty, greasy and fried foods and to incorporate more fruits and vegetables in her diet  -Continue pravastatin 40    Hypothyroidism  - TSH wnl on 10/14/2024 however previously TPO and thyroglobulin +ve - suggestive of Hashimotos  - On Synthroid 68.5mcg daily  - No new symptoms.      primary hyperparathyroidism  -Following up with endocrinology, she deferred surgical management at this time   -Continue monitoring Ca levels, prolia per endo.        Bilateral wrist osteoarthritis/knee osteoarthritis  - Advised against the chronic/heavy use of NSAIDs given her slightly depressed renal function  - Follows with sports medicine clinic for steroid injections which provides great relief for pt. pt to f/u PRN. She will let me know if she needs another referral.    - improving.      Osteoporosis given fragility fracture  -DEXA scan on 12/22 showed osteopenia in the femoral necks  -T score of -2. Bilateral femoral neck avg density. L1-L4 T= 0.3.   -Alendronate DCed on 7/2021 given GI adverse effects an started on Prolia with  q6mts  -  monitor Ca levels  - DEXA 12/12/22 The bilateral total mean femoral bone mineral density is equal to 0.752 g/cm squared with a T score of -2.0. This is improved from prior in 2020.           Glaucoma  Blepharitis/keratoconjunctivitis  Strabismus.   Cataracts  -Continue f/u with Ophthalmology     Traumatic chronic rotatory cuff tear  -MRI upper extremity right side showed subacute appearing and mildly impacted fracture of the humeral neck with extension to the posterolateral humeral head, no gross fragment displacement identified.  Suspected chronic partial-thickness tear of the supraspinatus tendon  -continue following up with ortho clinic.     Stage 2 CKD  - eGFR > 60 today   Continue renal sparing activities:  -Follow low sodium diet (2 grams a day)  -Control high blood pressure ( goal BP < 130/80, please record BP at home every day and bring log to next office visit)  -Exercise at least 30 minutes a day, 5 days a week.  -Maintain healthy weight.  -Decrease or stop alcohol use  -Do not smoke  -Stay well hydrated  -Receive Pneumovax, Flu, and HBV vaccines as indicated.  -Do not take NSAIDs (Ibuprofen, Naproxen, Aleve, Advil, Toradol, Mobic), may take only Tylenol as needed for pain/headaches.  -Take cholesterol-lowering medications as prescribed (LDL goal <100)      BBPV  -SNHL  -Good resolution of symptoms after Epley maneuver  -Continue follow-up with ENT/audiology appt is later this month.  - Ok to take dramamine or meclizine prn, however, this is not a definitive tx.  - new complaint of tinnitis today. DC losartan which may be an offending agent. Norvasc also potentially suspicious. VS recent URI vs worse allergies. Call ENT back regarding tinnitus sx if no improvement in the coming days.     Health Maintenance  Colon cancer screening: Normal in 2013. Cologuard ordered.     Lung Cancer screen: not indicated.  Mammogram: continue annual screening. Normal 10/22.  PapSmear: has discussed with GYN, no  longer required.   Hep C: non-reactive.  Dexa: UTD. see above note of osteoporosis  Vaccines      Pneumonia: UTD      Annual Flu: UTD      Zoster: declines.      Tdap: 7/25/18    Immunization History   Administered Date(s) Administered    COVID-19 MRNA, LN-S PF (MODERNA HALF 0.25 ML DOSE) 10/25/2021    COVID-19 Vaccine 10/25/2021, 04/11/2022    COVID-19, MRNA, LN-S, PF (MODERNA FULL 0.5 ML DOSE) 02/19/2021, 03/19/2021    Influenza 11/13/2017    Influenza - Quadrivalent 12/14/2016    Influenza - Quadrivalent - High Dose - PF (65 years and older) 10/21/2020, 10/04/2021, 10/11/2022    Influenza - Trivalent - Fluzone High Dose - PF (65 years and older) 11/04/2015, 11/13/2017, 11/12/2018, 11/05/2019    Pneumococcal 11/04/2015    Pneumococcal Conjugate - 13 Valent 07/18/2019    Pneumococcal Polysaccharide - 23 Valent 11/04/2015    Tdap 07/25/2018, 07/25/2018           Follow-ups   - Optho   - ENT For BBPV, Annual audio  - endo   - GYN - pap deferred 2/2 age and adequate screenings.            Nahid Lazcano DO  [facility] Internal Medicine, [facility]

## 2025-04-28 ENCOUNTER — OFFICE VISIT (OUTPATIENT)
Dept: INTERNAL MEDICINE | Facility: CLINIC | Age: 80
End: 2025-04-28
Payer: MEDICARE

## 2025-04-28 ENCOUNTER — LAB VISIT (OUTPATIENT)
Dept: LAB | Facility: HOSPITAL | Age: 80
End: 2025-04-28
Payer: MEDICARE

## 2025-04-28 VITALS
WEIGHT: 133.38 LBS | TEMPERATURE: 98 F | OXYGEN SATURATION: 97 % | RESPIRATION RATE: 16 BRPM | HEART RATE: 76 BPM | BODY MASS INDEX: 26.89 KG/M2 | DIASTOLIC BLOOD PRESSURE: 85 MMHG | SYSTOLIC BLOOD PRESSURE: 123 MMHG | HEIGHT: 59 IN

## 2025-04-28 DIAGNOSIS — I10 HYPERTENSION, UNSPECIFIED TYPE: ICD-10-CM

## 2025-04-28 DIAGNOSIS — E06.3 HASHIMOTO'S THYROIDITIS: ICD-10-CM

## 2025-04-28 DIAGNOSIS — Z00.00 HEALTHCARE MAINTENANCE: ICD-10-CM

## 2025-04-28 DIAGNOSIS — I10 HYPERTENSION, UNSPECIFIED TYPE: Primary | ICD-10-CM

## 2025-04-28 DIAGNOSIS — E21.3 HYPERPARATHYROIDISM: ICD-10-CM

## 2025-04-28 LAB
ALBUMIN SERPL-MCNC: 3.9 G/DL (ref 3.4–4.8)
ALBUMIN/GLOB SERPL: 1 RATIO (ref 1.1–2)
ALP SERPL-CCNC: 67 UNIT/L (ref 40–150)
ALT SERPL-CCNC: 17 UNIT/L (ref 0–55)
ANION GAP SERPL CALC-SCNC: 8 MEQ/L
AST SERPL-CCNC: 17 UNIT/L (ref 11–45)
BASOPHILS # BLD AUTO: 0.1 X10(3)/MCL
BASOPHILS NFR BLD AUTO: 1.2 %
BILIRUB SERPL-MCNC: 0.5 MG/DL
BUN SERPL-MCNC: 15.8 MG/DL (ref 9.8–20.1)
CALCIUM SERPL-MCNC: 10.6 MG/DL (ref 8.4–10.2)
CHLORIDE SERPL-SCNC: 107 MMOL/L (ref 98–107)
CHOLEST SERPL-MCNC: 230 MG/DL
CHOLEST/HDLC SERPL: 3 {RATIO} (ref 0–5)
CO2 SERPL-SCNC: 25 MMOL/L (ref 23–31)
CREAT SERPL-MCNC: 1.02 MG/DL (ref 0.55–1.02)
CREAT/UREA NIT SERPL: 15
EOSINOPHIL # BLD AUTO: 1.31 X10(3)/MCL (ref 0–0.9)
EOSINOPHIL NFR BLD AUTO: 15.3 %
ERYTHROCYTE [DISTWIDTH] IN BLOOD BY AUTOMATED COUNT: 12.8 % (ref 11.5–17)
GFR SERPLBLD CREATININE-BSD FMLA CKD-EPI: 56 ML/MIN/1.73/M2
GLOBULIN SER-MCNC: 4 GM/DL (ref 2.4–3.5)
GLUCOSE SERPL-MCNC: 79 MG/DL (ref 82–115)
HCT VFR BLD AUTO: 42.1 % (ref 37–47)
HDLC SERPL-MCNC: 76 MG/DL (ref 35–60)
HGB BLD-MCNC: 13.6 G/DL (ref 12–16)
IMM GRANULOCYTES # BLD AUTO: 0.01 X10(3)/MCL (ref 0–0.04)
IMM GRANULOCYTES NFR BLD AUTO: 0.1 %
LDLC SERPL CALC-MCNC: 134 MG/DL (ref 50–140)
LYMPHOCYTES # BLD AUTO: 2.62 X10(3)/MCL (ref 0.6–4.6)
LYMPHOCYTES NFR BLD AUTO: 30.6 %
MCH RBC QN AUTO: 31.3 PG (ref 27–31)
MCHC RBC AUTO-ENTMCNC: 32.3 G/DL (ref 33–36)
MCV RBC AUTO: 96.8 FL (ref 80–94)
MONOCYTES # BLD AUTO: 0.81 X10(3)/MCL (ref 0.1–1.3)
MONOCYTES NFR BLD AUTO: 9.5 %
NEUTROPHILS # BLD AUTO: 3.71 X10(3)/MCL (ref 2.1–9.2)
NEUTROPHILS NFR BLD AUTO: 43.3 %
NRBC BLD AUTO-RTO: 0 %
PLATELET # BLD AUTO: 279 X10(3)/MCL (ref 130–400)
PMV BLD AUTO: 10.5 FL (ref 7.4–10.4)
POTASSIUM SERPL-SCNC: 4.2 MMOL/L (ref 3.5–5.1)
PROT SERPL-MCNC: 7.9 GM/DL (ref 5.8–7.6)
RBC # BLD AUTO: 4.35 X10(6)/MCL (ref 4.2–5.4)
SODIUM SERPL-SCNC: 140 MMOL/L (ref 136–145)
TRIGL SERPL-MCNC: 99 MG/DL (ref 37–140)
VLDLC SERPL CALC-MCNC: 20 MG/DL
WBC # BLD AUTO: 8.56 X10(3)/MCL (ref 4.5–11.5)

## 2025-04-28 PROCEDURE — 99214 OFFICE O/P EST MOD 30 MIN: CPT | Mod: PBBFAC

## 2025-04-28 PROCEDURE — 80061 LIPID PANEL: CPT

## 2025-04-28 PROCEDURE — 36415 COLL VENOUS BLD VENIPUNCTURE: CPT

## 2025-04-28 PROCEDURE — 80053 COMPREHEN METABOLIC PANEL: CPT

## 2025-04-28 PROCEDURE — 85025 COMPLETE CBC W/AUTO DIFF WBC: CPT

## 2025-04-28 RX ORDER — LEVOTHYROXINE SODIUM 137 UG/1
68.5 TABLET ORAL DAILY
Qty: 45 TABLET | Refills: 1 | Status: SHIPPED | OUTPATIENT
Start: 2025-04-28

## 2025-04-28 RX ORDER — AMLODIPINE BESYLATE 5 MG/1
TABLET ORAL
Qty: 90 TABLET | Refills: 1 | Status: SHIPPED | OUTPATIENT
Start: 2025-04-28

## 2025-04-28 NOTE — PROGRESS NOTES
I have reviewed and concur with the resident's history, physical, assessment, and plan.  I have discussed with him all issues related to the diagnosis, workup and treatment plan. Care provided as reasonable and necessary.    Soto Lockett MD  [facility]

## 2025-07-16 NOTE — PROGRESS NOTES
Audio Only Telehealth Visit     The patient location is: [facility]  The chief complaint leading to consultation is: f/u  Visit type: Virtual visit with audio only (telephone)  Total time spent on medical discussion: 11 min  Total time spent on visit: 12 min     The reason for the audio only service rather than synchronous audio and video virtual visit was related to technical difficulties or patient preference/necessity.     Each patient to whom I provide medical services by telemedicine is:  (1) informed of the relationship between the physician and patient and the respective role of any other health care provider with respect to management of the patient; and (2) notified that they may decline to receive medical services by telemedicine and may withdraw from such care at any time. Patient verbally consented to receive this service via voice-only telephone call.       HPI:  4/12/21: 75yoF referred for dizziness. Is having dizzy episodes ~1-2x/day which began 1/2021. Describes as feeling is as if balance is off and lightheaded; is unsure what exacerbates this. Also has occasional brief room spinning when she turns over in the bed. Episodes last for a few seconds. Reports having normal orthostatic VS at last PCP visit. Denies appreciable HL or tinnitus. Denies medication change. Endorses falling on either side of her body in June of 2020 and January of 2021.    5/26/21: Reports dizziness has been greatly improved since Epley and with home maneuvers. Requesting to have her ears cleaned. She uses qtips to clean them at home.    6/17/21: Denies otorrhea or otalgia. Continues to do well from a dizziness standpoint.    10/11/2022: Reports she began having vertiginous dizziness again 4-6 weeks ago which is similar to her previous BPPV. She has not had any falls or medication changes. She is wondering if it is because she has ear wax buildup.     11/22/22: States that dizziness had improved following Epley, however, she  had a bad episode again last week. She reports resolution following home Epley demonstrated by audiology, but she is very concerned and anxious about it coming back.    5/23/23: Reports continued dizziness, occurring a few times weekly. Feels spinning to lightheaded, similar to before but not as severe. Episodes do not last long. She is unable to say if there are exacerbating factors. Right ear canal has been itchy & she feels that it needs to be cleaned.    11/28/23: States she had been doing very well since last visit, however, she has had a couple of transient episodes of lightheadedness which began 2 days ago. This is not exacerbated by movement. She is wondering if it could be r/t her vision. She has a left eye surgery scheduled in January & plans to get new glasses following surgery.     3/19/24: Lightheaded episodes have improved. States she is still nervous that dizziness will return.     09/19/2024: Doing well overall. Has infrequent lightheadedness but no vertiginous dizziness. States her allergies are flaring with the season change, and she has been having some nasal congestion and rhinitis. She has tried flonase & zyrtec prn.     12/06/24: Presents today with c/o tinnitus. States this began acutely about 6 weeks ago, and she went to the ED. States the humming and drumming noises were so loud that they were affecting her balance and making her nauseated. Symptoms have improved some, now being intermittent. States she notice tinnitus is much louder when she is in crowded areas, such as the grocery store, as she does not like crowds. It is better when she is home and things are quiet. Reports she is uninterested in repeating Hallpike, that it helped but she did not like it at all.      07/16/2025: Reports she has been doing well overall. She continues to get a little dizziness occasionally which is manageable with meclizine. Reports she does not feel that she needs CRM.    Audio:        Assessment and plan:   79 y.o. female with hx of recurrent right BPPV. Audio with b/l SNHL.   - Camara Daroff exercises prn  - Safety precautions  - Annual audio  - Meclizine prn   - RTC 1yr       Annalisa Mack NP    This service was not originating from a related E/M service provided within the previous 7 days nor will  to an E/M service or procedure within the next 24 hours or my soonest available appointment.  Prevailing standard of care was able to be met in this audio-only visit.

## 2025-07-17 ENCOUNTER — OFFICE VISIT (OUTPATIENT)
Dept: OTOLARYNGOLOGY | Facility: CLINIC | Age: 80
End: 2025-07-17
Payer: MEDICARE

## 2025-07-17 DIAGNOSIS — R42 DIZZINESS: Primary | ICD-10-CM

## 2025-07-17 DIAGNOSIS — H90.3 SENSORINEURAL HEARING LOSS (SNHL) OF BOTH EARS: ICD-10-CM

## 2025-07-17 RX ORDER — MECLIZINE HYDROCHLORIDE 25 MG/1
25 TABLET ORAL 3 TIMES DAILY PRN
Qty: 45 TABLET | Refills: 3 | Status: SHIPPED | OUTPATIENT
Start: 2025-07-17

## 2025-07-28 ENCOUNTER — HOSPITAL ENCOUNTER (OUTPATIENT)
Dept: RADIOLOGY | Facility: HOSPITAL | Age: 80
Discharge: HOME | End: 2025-07-28
Attending: INTERNAL MEDICINE
Payer: MEDICARE

## 2025-07-28 DIAGNOSIS — M81.0 OSTEOPOROSIS, UNSPECIFIED OSTEOPOROSIS TYPE, UNSPECIFIED PATHOLOGICAL FRACTURE PRESENCE: ICD-10-CM

## 2025-07-28 DIAGNOSIS — E06.3 CHRONIC LYMPHOCYTIC THYROIDITIS: ICD-10-CM

## 2025-07-28 DIAGNOSIS — E55.9 VITAMIN D DEFICIENCY: ICD-10-CM

## 2025-07-28 DIAGNOSIS — E21.0 PRIMARY HYPERPARATHYROIDISM: ICD-10-CM

## 2025-07-28 PROCEDURE — 77080 DXA BONE DENSITY AXIAL: CPT | Mod: TC

## 2025-08-05 ENCOUNTER — OFFICE VISIT (OUTPATIENT)
Dept: OPHTHALMOLOGY | Facility: CLINIC | Age: 80
End: 2025-08-05
Payer: MEDICARE

## 2025-08-05 VITALS — WEIGHT: 133.38 LBS | BODY MASS INDEX: 26.89 KG/M2 | HEIGHT: 59 IN

## 2025-08-05 DIAGNOSIS — H04.123 DRY EYE SYNDROME OF BOTH EYES: ICD-10-CM

## 2025-08-05 DIAGNOSIS — H26.493 POSTERIOR CAPSULAR OPACIFICATION NON VISUALLY SIGNIFICANT OF BOTH EYES: ICD-10-CM

## 2025-08-05 DIAGNOSIS — H50.00 ESOTROPIA: ICD-10-CM

## 2025-08-05 DIAGNOSIS — Q07.8 ANOMALOUS OPTIC NERVE: Primary | ICD-10-CM

## 2025-08-05 PROCEDURE — 99213 OFFICE O/P EST LOW 20 MIN: CPT | Mod: PBBFAC,PN

## 2025-08-05 RX ORDER — FLUORESCEIN SODIUM AND BENOXINATE HYDROCHLORIDE 2.6; 4.4 MG/ML; MG/ML
1 SOLUTION/ DROPS OPHTHALMIC
Status: COMPLETED | OUTPATIENT
Start: 2025-08-05 | End: 2025-08-05

## 2025-08-05 RX ADMIN — FLUORESCEIN SODIUM AND BENOXINATE HYDROCHLORIDE 1 DROP: 2.6; 4.4 SOLUTION/ DROPS OPHTHALMIC at 08:08

## 2025-08-05 NOTE — PROGRESS NOTES
HPI    RTC 6 months MRX, possible PCO eval OS.   - Blur in OS that comes and goes, described as dark in color. No   particular thing triggers blurred vision and is not every day.   Last edited by Lindy Briseno LPN on 8/5/2025  8:25 AM.            Assessment /Plan     For exam results, see Encounter Report.    Anomalous optic nerve  -     fluorescein-benoxinate 0.3-0.4% ophthalmic solution 1 drop    Posterior capsular opacification non visually significant of both eyes    Esotropia    Dry eye syndrome of both eyes        HPI    RTC 6 months MRX, possible PCO eval OS.   - Blur in OS that comes and goes, described as dark in color. No   particular thing triggers blurred vision and is not every day.   Last edited by Lindy Briseno LPN on 8/5/2025  8:25 AM.        Assessment /Plan     For exam results, see Encounter Report.    Anomalous optic nerve  -     fluorescein-benoxinate 0.3-0.4% ophthalmic solution 1 drop    Posterior capsular opacification non visually significant of both eyes    Esotropia    Dry eye syndrome of both eyes        ANCILLARY TESTING     OCT RNFL  8/2/24:  OD 76 thinning IN (unreliable unable to find cup)  OS 78 all green   2/29/24:   OD 81 thinning IN (unreliable unable to find cup)  OS 78 all green   8/31/2023:   OD: thinning nasally, unreliable   OS: unreliable due to blockage superiorly    02/03/2025  OD: 76, sup green, yel n, inf red, white T ss 8/10  OS: 80, all green ss 8/10  Stable OU     HVF 24-2  - 7/19/22  OD: 2/10 FL 0% FP 0% FN, nonspecific scattered deficits  OS: 1/10 FL 4% FP 0% FN, full field  - 11/18/22  OD: FL 0/11, FP 5%, FN 0%, few central defects  OS: FL 9/11, FP 0%, FN 7%, enlarged blind spot/dense defect temporally     Steve 6/13/24  OD: 43.36/45.15x056, regular astigmatism total 1.79D  OS: 43.66/45.26x076, regular astigmatism total 1.6D       OCT MAC   08/02/2024  OD: Normal Foveal Contour, No IRF/SRF  OS: Normal Foveal Contour, No IRF/SRF  Impression: No pathology  noted, Stable      1. Myopia OU with posterior staphyloma OS, esotropia alternating  - Patient with long standing myopia. Does have hx of thyroid disease but not c/w restrictive disease.   - On further questioning she reports her left eye has always been weaker than her right eye. Esotropia started after cataract surgery OS.   - ~25PD ET in primary gaze stable. Alternating  - s/p strab surgery with estela 1/26/24  - 2/29/24 doing well, has fu visits in next few weeks, Will cont to follow  - 6/13/24: Still has residual ET about 15PD  - 02/03/2025: doing well, no double vision. 18PD ET.  CTM    2. Anomalous nerves  - previously open angle with borderline findings  - Based on CDR asymmetry  - CCT thin OS>OD  - Gonio open to CBB  - OCT RNFL 1/25/22: IN thinning OD, full OS  2/292/24 81 IN thinning, 78 all green   - HVF   7/2022 essentially full with nonspecific deficits OD (does not correlate with OCT thinning)  11/2022: full with nonspecific central defects right eye // left eye unreliable due to high FL  -2/29/24: still poor scan in OD due to tilting, OS full octn. Iop remains wnl. Very small cups ou, Will stop OCTN/VF as this time, no suspicion for glaucoma   - 02/03/2025: crowded disc OD, 0.1 OS. Not suspicious for glaucoma given small cup discs. OCTN testing stable. Fundus photo taken today (note misspelling of name, use MRN to search harmony).   - 8/5/25: exam stable. continue to monitor with yearly DFE and OCTMac next due 6 months    3. Blepharitis and Keratoconjunctivitis sicca OU  - With complaint of burning most hours of the day  - Did not notice improvement with lotemax, restasis, xiidra, BCL, Avenova, punctal plugs  - Has punctal plug in place OS, absent OD  - Lab work: SSA, SSB, YAW, RF, ACE/lysozyme, scleroderma, Anti-smith, CRP/ESR all within normal limits  - 8/5/25: patient symptomatic with PEEs, only using PFATs 2-3 times per day, told to increase and start ointment QHS   - Continue to Monitor     4.  Bilateral lower eyelid laxity, trace involutional ectropion  - May be contributing to eye surface symptoms  - Will treat surface as above & defer any surgery until after possible cataract surgery and Dr. Woodard strabismus eval  - 02/03/2025: will continue to monitor, no PEE at this time.     5. Pseudophakia OS (10/8/20)  6. PCO OS   - BCVA 20/25 11/18/22  - 2/29/24: starting to see temporal crescent in OS, with new ealry pco temporally, dfe otherwise wnl. RD precautions discussed but would hold off on yag at this time   - 02/03/2025: PCO trace in visual axis, patient notes constant blurring OS that does not improve on drops. VA ok at 20/25. Will evaluate in six months for PCO OS and possible yag cap (discussed with patient, is interested). If continues to be not visually significant will space visit to one year (and get DFE, octn at that time).   - 8/5/25: BCVA 20/25, symptoms associated with ALMA, ctm for yearly exam    7. Pseudophakia, right eye (07/02/2024)  8. Trace PCO OD  - s/p phaco/IOL OD (DOS: 7/2/24)   - POW#1: Doing well, wound Candy negative, IOP controlled, pt happy with vision  - POM#1: doing well. IOP controlled. Happy with vision. BCVA 20/25 // 20/30. Off all drops. Mrx provided   - 02/03/2025: presents for six month dfe. No symptoms, improved vision to 20/25    RTC 6 months DFE OCTM and N

## 2025-09-03 ENCOUNTER — OFFICE VISIT (OUTPATIENT)
Facility: CLINIC | Age: 80
End: 2025-09-03
Payer: MEDICARE

## 2025-09-03 ENCOUNTER — HOSPITAL ENCOUNTER (OUTPATIENT)
Facility: HOSPITAL | Age: 80
Discharge: HOME | End: 2025-09-03
Payer: MEDICARE

## 2025-09-03 VITALS
HEART RATE: 76 BPM | WEIGHT: 142.44 LBS | TEMPERATURE: 98 F | BODY MASS INDEX: 28.72 KG/M2 | HEIGHT: 59 IN | SYSTOLIC BLOOD PRESSURE: 128 MMHG | DIASTOLIC BLOOD PRESSURE: 85 MMHG | OXYGEN SATURATION: 98 %

## 2025-09-03 DIAGNOSIS — M25.511 RIGHT SHOULDER PAIN, UNSPECIFIED CHRONICITY: ICD-10-CM

## 2025-09-03 DIAGNOSIS — M54.12 CERVICAL RADICULOPATHY: Primary | ICD-10-CM

## 2025-09-03 DIAGNOSIS — M25.512 LEFT SHOULDER PAIN, UNSPECIFIED CHRONICITY: ICD-10-CM

## 2025-09-03 PROCEDURE — 73030 X-RAY EXAM OF SHOULDER: CPT | Mod: TC,PN,LT

## 2025-09-03 PROCEDURE — 99215 OFFICE O/P EST HI 40 MIN: CPT | Mod: PBBFAC,25,PN

## 2025-09-03 PROCEDURE — 73030 X-RAY EXAM OF SHOULDER: CPT | Mod: TC,PN,RT

## (undated) DEVICE — CONTAINER SPECIMEN OR STER 4OZ

## (undated) DEVICE — GLOVE SIGNATURE MICRO LTX 7

## (undated) DEVICE — GOWN POLY REINF BRTH SLV XL

## (undated) DEVICE — Device

## (undated) DEVICE — GLOVE SENSICARE PI GRN 6.5

## (undated) DEVICE — DRESSING TRANS 2X2 TEGADERM

## (undated) DEVICE — NDL MAGELLAN SAFETY 18G 1.5IN

## (undated) DEVICE — NDL FLTR 5MCRN BLNT TIP 18GX1

## (undated) DEVICE — PACK CATARACT BAUSCH AND LOMB

## (undated) DEVICE — TAPE CURAD PAPER ADH 1INX10YD

## (undated) DEVICE — SYR 10CC LUER LOCK

## (undated) DEVICE — GLOVE SIGNATURE MICRO LTX 8.5

## (undated) DEVICE — MARKER WRITESITE SKIN CHLRAPRP

## (undated) DEVICE — CANNULA OPTH 27G .41X22MM

## (undated) DEVICE — PROTECTOR ONE-STEP ARM REG

## (undated) DEVICE — SYR 3CC LUER LOC